# Patient Record
Sex: MALE | Race: WHITE | NOT HISPANIC OR LATINO | Employment: FULL TIME | URBAN - METROPOLITAN AREA
[De-identification: names, ages, dates, MRNs, and addresses within clinical notes are randomized per-mention and may not be internally consistent; named-entity substitution may affect disease eponyms.]

---

## 2019-07-23 ENCOUNTER — OFFICE VISIT (OUTPATIENT)
Dept: FAMILY MEDICINE | Facility: CLINIC | Age: 24
End: 2019-07-23
Payer: COMMERCIAL

## 2019-07-23 VITALS
RESPIRATION RATE: 16 BRPM | OXYGEN SATURATION: 98 % | TEMPERATURE: 98 F | DIASTOLIC BLOOD PRESSURE: 80 MMHG | BODY MASS INDEX: 25.98 KG/M2 | WEIGHT: 175.4 LBS | SYSTOLIC BLOOD PRESSURE: 122 MMHG | HEIGHT: 69 IN | HEART RATE: 63 BPM

## 2019-07-23 DIAGNOSIS — L70.9 ACNE, UNSPECIFIED ACNE TYPE: ICD-10-CM

## 2019-07-23 DIAGNOSIS — Z83.438 FAMILY HISTORY OF HYPERLIPIDEMIA: ICD-10-CM

## 2019-07-23 DIAGNOSIS — F64.0 GENDER DYSPHORIA IN ADULT: Primary | ICD-10-CM

## 2019-07-23 RX ORDER — TESTOSTERONE CYPIONATE 200 MG/ML
80 INJECTION, SOLUTION INTRAMUSCULAR WEEKLY
Qty: 2 ML | Refills: 0 | Status: SHIPPED | OUTPATIENT
Start: 2019-07-23 | End: 2019-08-20

## 2019-07-23 RX ORDER — TESTOSTERONE CYPIONATE 200 MG/ML
80 INJECTION, SOLUTION INTRAMUSCULAR WEEKLY
COMMUNITY
End: 2020-01-20

## 2019-07-23 RX ORDER — FLUOXETINE 20 MG/1
30 TABLET, FILM COATED ORAL DAILY
COMMUNITY
End: 2019-08-20

## 2019-07-23 SDOH — HEALTH STABILITY: MENTAL HEALTH: HOW MANY DRINKS CONTAINING ALCOHOL DO YOU HAVE ON A TYPICAL DAY WHEN YOU ARE DRINKING?: 1 OR 2

## 2019-07-23 SDOH — HEALTH STABILITY: MENTAL HEALTH: HOW OFTEN DO YOU HAVE SIX OR MORE DRINKS ON ONE OCCASION?: LESS THAN MONTHLY

## 2019-07-23 SDOH — HEALTH STABILITY: MENTAL HEALTH: HOW OFTEN DO YOU HAVE A DRINK CONTAINING ALCOHOL?: 2-3 TIMES A WEEK

## 2019-07-23 SDOH — HEALTH STABILITY: MENTAL HEALTH: HOW OFTEN DO YOU HAVE 6 OR MORE DRINKS ON ONE OCCASION?: LESS THAN MONTHLY

## 2019-07-23 SDOH — HEALTH STABILITY: MENTAL HEALTH: HOW MANY STANDARD DRINKS CONTAINING ALCOHOL DO YOU HAVE ON A TYPICAL DAY?: 1 OR 2

## 2019-07-23 ASSESSMENT — ANXIETY QUESTIONNAIRES
IF YOU CHECKED OFF ANY PROBLEMS ON THIS QUESTIONNAIRE, HOW DIFFICULT HAVE THESE PROBLEMS MADE IT FOR YOU TO DO YOUR WORK, TAKE CARE OF THINGS AT HOME, OR GET ALONG WITH OTHER PEOPLE: SOMEWHAT DIFFICULT
7. FEELING AFRAID AS IF SOMETHING AWFUL MIGHT HAPPEN: NOT AT ALL
3. WORRYING TOO MUCH ABOUT DIFFERENT THINGS: MORE THAN HALF THE DAYS
5. BEING SO RESTLESS THAT IT IS HARD TO SIT STILL: SEVERAL DAYS
GAD7 TOTAL SCORE: 7
1. FEELING NERVOUS, ANXIOUS, OR ON EDGE: SEVERAL DAYS
2. NOT BEING ABLE TO STOP OR CONTROL WORRYING: NOT AT ALL
6. BECOMING EASILY ANNOYED OR IRRITABLE: MORE THAN HALF THE DAYS

## 2019-07-23 ASSESSMENT — PATIENT HEALTH QUESTIONNAIRE - PHQ9
SUM OF ALL RESPONSES TO PHQ QUESTIONS 1-9: 10
5. POOR APPETITE OR OVEREATING: SEVERAL DAYS

## 2019-07-23 ASSESSMENT — MIFFLIN-ST. JEOR: SCORE: 1778.05

## 2019-07-23 NOTE — PATIENT INSTRUCTIONS
Here is the plan from today's visit    1. Gender dysphoria in adult  Labs through [facility] or can call with results. Continue current T dose.   - CBC with Diff Plt ([facility]); Future  - Comprehensive Metabolic Panel ([facility]); Future  - Lipid Cascade ([facility]); Future  - Testosterone total; Future    Please call or return to clinic if your symptoms don't go away.    Thank you for coming to [facility] today.  Lab Testing:  **If you had lab testing today and your results are reassuring or normal they will be mailed to you or sent through [facility] within 7 days.   **If the lab tests need quick action we will call you with the results.  The phone number we will call with results is # [phone] (home) . If this is not the best number please call our clinic and change the number.  Medication Refills:  If you need any refills please call your pharmacy and they will contact us.   If you need to  your refill at a new pharmacy, please contact the new pharmacy directly. The new pharmacy will help you get your medications transferred faster.   Scheduling:  If you have any concerns about today's visit or wish to schedule another appointment please call our office during normal business hours [phone] (8-5:00 M-F)  If a referral was made to a [facility] and you don't get a call from central scheduling please call [phone].  If a Mammogram was ordered for you at [facility] call [phone] to schedule or change your appointment.  If you had an XRay/CT/Ultrasound/MRI ordered the number is [phone] to schedule or change your radiology appointment.   Medical Concerns:  If you have urgent medical concerns please call [phone] at any time of the day.    Jodi Sharpe MD

## 2019-07-23 NOTE — PROGRESS NOTES
"       HPI   I am seeing this patient as a self referred consult for new patient as patient recently moved to [facility] from [place]. Parent in medicine and suggested he come here.    Luis is a 23 year old individual that uses pronouns he/him and is consulting due to continuing HRT.     Just moved to [place]. From [place]. 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     Gender identity  on spectrum of gender from masculine to feminine: masculine  Gender journey: Queer in high school, \"queen of the lesbians\" in high school. Then, went to college, met other trans people and thought this was more accurate for him. Came out to parents in college, very supportive. \"maximally smooth transition\". Few months after in sophomore, 21 years old, in college, started HRT there. On 80 mg weekly now, shot day Wednesday.   Mom is a PA and does training for queer healthcare for coworkers.   Sex assigned at birth: female  Support network for gender identity: family mom, dad and friends 4 friends that are trans, boyfriend is also trans, been together for 2 years    Body characteristics pt is happy with and does not want to change:  Generally good, wants less acne, has Mirena in place and nervous about getting periods again or having \"bumpy transition\". Last period was 4 years ago. Not spotting now, \"very rare occasions\"     Body characteristics pt is NOT happy with and WANTS to change:    Struggle with weight and wanting to be more active. Trying to accept \"wide hips\"   Enjoys rock climbing    Previous medical care related to gender affirmation:   Prior providers Dr. Fredi Brock [facility]   Already taking medications related to gender dysphoria (ie: hormones): YES   If YES, medications include the following: yes- testosterone   Previous surgeries related to gender affirmation include: top surgery in 2016   Surgical interventions desired: none currently     Mental " "Health Assessment: history of eating disorder, bulemia and binge/purge type  PHQ-9 SCORE 7/23/2019   PHQ-9 Total Score 10     PHQ-9 is 10; SERGO-7 is 7.   SERGO-7 SCORE 7/23/2019   Total Score 7     Active symptoms: working through life adjusting to new home and new roommates (not jiving well so far)  History of depression/anxiety- used to see therapist at school in [place], planning to go back to this therapist around town again here at a private practice. Taking fluoxetine since high school 30 mg. Likes this dose.    Hx of self harm:  Yes; please explain: last in high school self cutting, last cut relapse in college around time of coming out at age 21. 2.5 years since relapse in bulemia and binge/purge.    Hx of suicidal thoughts: No   Hx of suicide attempts: No    Support has helped him control eating disorder.     Therapy history for gender support: never had, only for SERGO/MDD  Non gender related therapist? See above.   Chemical use history Not addressed.   Psych dx history See above.   Medications prescribed for above and by whom? Fluoxetine see above  External Records received: not yet received     Shot day is Wednesday. Have enough for 2 more weeks. Been on testosterone (0.4 ml) weekly     Past History   Past Surgical History:   Procedure Laterality Date     MASTECTOMY, BILATERAL  2016     There is no problem list on file for this patient.    Past Medical History:   Diagnosis Date     Gender dysphoria in adult      Current Outpatient Medications   Medication Sig Dispense Refill     Eletriptan Hydrobromide (RELPAX PO)        FLUoxetine 20 MG tablet Take 30 mg by mouth daily       levonorgestrel (MIRENA) 20 MCG/24HR IUD 1 each by Intrauterine route once       needle, disp, 18G X 1\" MISC        Needle, Disp, 23G X 1\" MISC        Syringe, Disposable, (SYRINGE LUER LOCK) 3 ML MISC        testosterone cypionate (DEPOTESTOSTERONE) 200 MG/ML injection Inject 80 mg into the muscle once a week       testosterone cypionate " (DEPOTESTOSTERONE) 200 MG/ML injection Inject 0.4 mLs (80 mg) into the muscle once a week 2 mL 0     Family History   Problem Relation Age of Onset     Hyperlipidemia Mother      Hyperlipidemia Father      No Known Allergies  Social History     Socioeconomic History     Marital status: Single     Spouse name: None     Number of children: None     Years of education: None     Highest education level: None   Occupational History     None   Social Needs     Financial resource strain: None     Food insecurity:     Worry: None     Inability: None     Transportation needs:     Medical: None     Non-medical: None   Tobacco Use     Smoking status: Never Smoker     Smokeless tobacco: Never Used   Substance and Sexual Activity     Alcohol use: Yes     Frequency: 2-3 times a week     Drinks per session: 1 or 2     Binge frequency: Less than monthly     Drug use: Never     Sexual activity: Yes     Birth control/protection: IUD   Lifestyle     Physical activity:     Days per week: None     Minutes per session: None     Stress: None   Relationships     Social connections:     Talks on phone: None     Gets together: None     Attends Nondenominational service: None     Active member of club or organization: None     Attends meetings of clubs or organizations: None     Relationship status: None     Intimate partner violence:     Fear of current or ex partner: None     Emotionally abused: None     Physically abused: None     Forced sexual activity: None   Other Topics Concern     None   Social History Narrative     None     Last labs were 8 months ago.     Sexual health and relationships: not discussed needs to be addressed at next visit        Review of Systems:      CONSTITUTIONAL: NEGATIVE for fever, chills, change in weight  INTEGUMENTARY/SKIN: see HPI  CV: NEGATIVE for chest pain  HEME/ALLERGY/IMMUNE: see HPI  PSYCHIATRIC: see HPI  Sleep: not discussed           Physical Exam:     Vitals:    07/23/19 1131   BP: 122/80   BP Location:  "Right arm   Patient Position: Sitting   Cuff Size: Adult Regular   Pulse: 63   Resp: 16   Temp: 98  F (36.7  C)   TempSrc: Oral   SpO2: 98%   Weight: 79.6 kg (175 lb 6.4 oz)   Height: 1.74 m (5' 8.5\")     BMI= Body mass index is 26.28 kg/m .   Wt Readings from Last 10 Encounters:   07/23/19 79.6 kg (175 lb 6.4 oz)     Appearance: Male appearance and dress  GENERAL:: healthy, alert and no distress  Head: AT, NC   EYES: Eyes grossly normal to inspection, EOMI  RESP: effort normal  MS: extremities normal- no gross deformities noted, no edema  Psych: Alert and oriented times 3; coherent speech, normal rate and volume, able to articulate logical thoughts, able to abstract reason, no tangential thoughts, no hallucinations or delusions. Affect is normal.  Affect: Appropriate/mood-congruent  MENTAL STATUS EXAM  Appearance: appropriate  Attitude: cooperative  Behavior: normal  Eye Contact: normal  Speech: normal  Orientation: oreinted to person , place, time and situation  Mood:  \"okay\"  Thought Process: clear  Suicidal Ideation: denies  Hallucination: no       Labs:     No results found for any previous visit.   HRT labs to be collected soon.   Assessment and Plan   Luis was seen today for consult for gender support, continuing injectable testosterone (started this in 2016).     Diagnoses and all orders for this visit:    Gender dysphoria in adult  Labs ordered as future, needs lab only visit for labs on Friday or Monday for HRT labs. Says he has a history of hypercholesterolemia (and thinks this runs strongly in his family).   -     CBC with Diff Plt ([facility]); Future  -     Comprehensive Metabolic Panel ([facility]); Future  -     Lipid Cascade ([facility]); Future  -     Testosterone total; Future  -     testosterone cypionate (DEPOTESTOSTERONE) 200 MG/ML injection; Inject 0.4 mLs (80 mg) into the muscle once a week    Acne, unspecified acne type  Will discuss acne treatment at next visit in 1 month.     Discuss the " resources for gender affirming therapy. Also discuss sexual health at next visit.    Educated about 3 parts of evaluation:    1. Medical safety for hormones :   Labs ordered as future, to be done on Friday or Monday as shot day is Wed ROI for records sent, will need to be reviewed by: Dr. Brewster  Medication plan:   masculinizing hormone therapy with testosterone     Administration method:  Injectable  Next labs and exam: Follow up w/ Dr. Brewster in 1 month, I will send this note to them. Educated pt about consultant role in a residency clinic.  Recommend monthly visits after dose change or initiation. Next dose increase likely in 1 month if labs and exam are normal.  Goal dose: continue 80 mg for now  Counselled patient about controlled substances, never share, comes on paper prescription: Yes  Contraception:   not needed  Educated about testosterone as absolute contraindication in pregnancy: Yes  Fertility plan if starts cross-sex hormones: not addressed     2. Mental health assessment  To be completed by: Dr. Brewster    3. Informed consent process.   Consent  Yes Is able to provide informed consent   Yes Likely to take hormones in a responsible manner  Yes Discussed physical effects, benefits, and risk assessment & modification  Yes Discussed the clinical and biochemical monitoring of hormonal changes and the potential impact on reproductive health & fertility  We discussed thoroughly risks/benefits/alternatives of this treatment. Questions elicited and answered. Pt is fully prepared to continue hormones and is able to reason through risks and mgmt. Questions elicited and answered and they also consent, as is legally required. Will need informed consent at our clinic signed at next visit (patient has been on Testosterone for 2.5 years and was refilled today with recent move). Had informed consent at other clinic in [facility])     Adrian Brewster MD   [facility]  PGY-2    ---------------------------------------------------------------------------------------------------------    Pt can return to PCP for ongoing care at this time. Luis should have annual physical exams and labs after hormone initiation is complete (at goal dose).  Screenings and exams should be determined by the presence of body organs (ie: prostate, cervix, breasts) and medications the patient is taking. PCP is welcome to contact me at any time for further guidance if needed. For this particular patient, exams should focus on: BP, weight, lipids and weight concerns . Lab schedule should follow the hormone guideline sheet.      Today s visit included assessment of interventions to alleviate symptoms related to gender dysphoria or gender nonconformity, including psychological support, medical treatment, and options for social support or changes in gender expression. Today s visit also assessed this individual's suicide risk, as transgender patients are at higher risk of suicide, gender expression, gender identity and how well consolidated in that identity, presence or absence of gender dysphoria versus gender non-conformity, and assessment and diagnosis of coexisting mental health concerns.This assessment is based on the 2011 published Standards of Care for the Health of Transsexual, Transgender, and Gender-Nonconforming People, Version 7, by the World Professional Association of Transgender Health.  Jodi Sharpe MD

## 2019-07-24 ASSESSMENT — ANXIETY QUESTIONNAIRES: GAD7 TOTAL SCORE: 7

## 2019-07-29 DIAGNOSIS — F64.0 GENDER DYSPHORIA IN ADULT: ICD-10-CM

## 2019-07-29 LAB
% GRANULOCYTES: 60 %G (ref 40–75)
ALBUMIN SERPL-MCNC: 4.5 MG/DL (ref 3.8–5)
ALP SERPL-CCNC: 80.9 U/L (ref 31.7–110.7)
ALT SERPL-CCNC: 16.4 U/L (ref 0–45)
AST SERPL-CCNC: 17.3 U/L (ref 0–55)
BILIRUB SERPL-MCNC: 0.5 MG/DL (ref 0.2–1.3)
BUN SERPL-MCNC: 11.2 MG/DL (ref 7–21)
CALCIUM SERPL-MCNC: 9.1 MG/DL (ref 8.5–10.1)
CHLORIDE SERPLBLD-SCNC: 103.4 MMOL/L (ref 98–110)
CHOLEST SERPL-MCNC: 246.6 MG/DL (ref 0–200)
CHOLEST/HDLC SERPL: 5.5 {RATIO} (ref 0–5)
CO2 SERPL-SCNC: 29.7 MMOL/L (ref 20–32)
CREAT SERPL-MCNC: 1.1 MG/DL (ref 0.7–1.3)
GFR SERPL CREATININE-BSD FRML MDRD: 89 ML/MIN/1.7 M2
GLUCOSE SERPL-MCNC: 86 MG'DL (ref 70–99)
GRANULOCYTES #: 3 K/UL (ref 1.6–8.3)
HCT VFR BLD AUTO: 44.7 % (ref 40–53)
HDLC SERPL-MCNC: 44.6 MG/DL
HEMOGLOBIN: 14.1 G/DL (ref 13.3–17.7)
LDLC SERPL CALC-MCNC: 182 MG/DL (ref 0–129)
LYMPHOCYTES # BLD AUTO: 1.7 K/UL (ref 0.8–5.3)
LYMPHOCYTES NFR BLD AUTO: 33.9 %L (ref 20–48)
MCH RBC QN AUTO: 28.4 PG (ref 26.5–35)
MCHC RBC AUTO-ENTMCNC: 31.5 G/DL (ref 32–36)
MCV RBC AUTO: 90.1 FL (ref 78–100)
MID #: 0.3 K/UL (ref 0–2.2)
MID %: 6.1 %M (ref 0–20)
PLATELET # BLD AUTO: 329 K/UL (ref 150–450)
POTASSIUM SERPL-SCNC: 4.1 MMOL/DL (ref 3.3–4.5)
PROT SERPL-MCNC: 7 G/DL (ref 6.8–8.8)
RBC # BLD AUTO: 4.96 M/UL (ref 4.4–5.9)
SODIUM SERPL-SCNC: 138.5 MMOL/L (ref 132.6–141.4)
TRIGL SERPL-MCNC: 98.2 MG/DL (ref 0–150)
VLDL CHOLESTEROL: 19.6 MG/DL (ref 7–32)
WBC # BLD AUTO: 5 K/UL (ref 4–11)

## 2019-07-31 PROBLEM — L70.9 ACNE, UNSPECIFIED ACNE TYPE: Status: ACTIVE | Noted: 2019-07-31

## 2019-07-31 PROBLEM — Z83.438 FAMILY HISTORY OF HYPERLIPIDEMIA: Status: ACTIVE | Noted: 2019-07-31

## 2019-08-01 LAB — TESTOST SERPL-MCNC: 853 NG/DL (ref 240–950)

## 2019-08-20 ENCOUNTER — OFFICE VISIT (OUTPATIENT)
Dept: FAMILY MEDICINE | Facility: CLINIC | Age: 24
End: 2019-08-20
Payer: COMMERCIAL

## 2019-08-20 VITALS
RESPIRATION RATE: 16 BRPM | SYSTOLIC BLOOD PRESSURE: 114 MMHG | HEIGHT: 69 IN | TEMPERATURE: 98.1 F | OXYGEN SATURATION: 96 % | HEART RATE: 74 BPM | DIASTOLIC BLOOD PRESSURE: 75 MMHG | BODY MASS INDEX: 26.6 KG/M2 | WEIGHT: 179.6 LBS

## 2019-08-20 DIAGNOSIS — F64.0 GENDER DYSPHORIA IN ADULT: ICD-10-CM

## 2019-08-20 DIAGNOSIS — F32.1 CURRENT MODERATE EPISODE OF MAJOR DEPRESSIVE DISORDER, UNSPECIFIED WHETHER RECURRENT (H): ICD-10-CM

## 2019-08-20 DIAGNOSIS — M25.561 RIGHT KNEE PAIN, UNSPECIFIED CHRONICITY: Primary | ICD-10-CM

## 2019-08-20 DIAGNOSIS — E78.00 HIGH CHOLESTEROL: ICD-10-CM

## 2019-08-20 DIAGNOSIS — Z83.438 FAMILY HISTORY OF HYPERLIPIDEMIA: ICD-10-CM

## 2019-08-20 RX ORDER — TESTOSTERONE CYPIONATE 200 MG/ML
60 INJECTION, SOLUTION INTRAMUSCULAR WEEKLY
Qty: 5 ML | Refills: 0 | Status: SHIPPED | OUTPATIENT
Start: 2019-08-20 | End: 2019-10-03

## 2019-08-20 RX ORDER — FLUOXETINE 20 MG/1
30 TABLET, FILM COATED ORAL DAILY
Qty: 30 TABLET | Refills: 4 | Status: SHIPPED | OUTPATIENT
Start: 2019-08-20 | End: 2019-12-20

## 2019-08-20 ASSESSMENT — ANXIETY QUESTIONNAIRES
5. BEING SO RESTLESS THAT IT IS HARD TO SIT STILL: NOT AT ALL
1. FEELING NERVOUS, ANXIOUS, OR ON EDGE: SEVERAL DAYS
7. FEELING AFRAID AS IF SOMETHING AWFUL MIGHT HAPPEN: NOT AT ALL
6. BECOMING EASILY ANNOYED OR IRRITABLE: MORE THAN HALF THE DAYS
2. NOT BEING ABLE TO STOP OR CONTROL WORRYING: SEVERAL DAYS
IF YOU CHECKED OFF ANY PROBLEMS ON THIS QUESTIONNAIRE, HOW DIFFICULT HAVE THESE PROBLEMS MADE IT FOR YOU TO DO YOUR WORK, TAKE CARE OF THINGS AT HOME, OR GET ALONG WITH OTHER PEOPLE: SOMEWHAT DIFFICULT
3. WORRYING TOO MUCH ABOUT DIFFERENT THINGS: SEVERAL DAYS
GAD7 TOTAL SCORE: 6

## 2019-08-20 ASSESSMENT — PATIENT HEALTH QUESTIONNAIRE - PHQ9
SUM OF ALL RESPONSES TO PHQ QUESTIONS 1-9: 10
5. POOR APPETITE OR OVEREATING: SEVERAL DAYS

## 2019-08-20 ASSESSMENT — ENCOUNTER SYMPTOMS
FEVER: 0
SHORTNESS OF BREATH: 0
ABDOMINAL PAIN: 0
CHILLS: 0

## 2019-08-20 ASSESSMENT — MIFFLIN-ST. JEOR: SCORE: 1797.1

## 2019-08-20 NOTE — PROGRESS NOTES
JENNIFER       Luis Epstein is a 23 year old  who presents for   Chief Complaint   Patient presents with     Follow Up     HRT follow up     Therapy - hasn't sought out gender affirming therapy yet but thinking about it  Lab results: high LDL and cholesterol     Diet: vegetarian and thinks he eats fairly healthy. Occasionally drinks 1 soda. Other doctor thought it was his diet but he isn't really sure this is a contributor. Likes salt but doesn't really add it after cooking.     Exercise: exercises 3x weekly 1 hour. Usually he rock climbs and jogs after this if needed.    as a job      FH:    Dad - high cholesterol  Mom - high cholesterol since college  Both take medication.     Right knee - 3 years duration. 'crackly' when moving, no catching  Or locking. Thought there may be meniscal tear in the past. No history of trauma or past surgery. Never had physical therapy for it before. No pain now. No swelling. No redness.     Problem, Medication and Allergy Lists were reviewed and updated if needed..    Patient is an established patient of this clinic.  Past Medical History:   Diagnosis Date     Gender dysphoria in adult      Family History   Problem Relation Age of Onset     Hyperlipidemia Mother      Hyperlipidemia Father      Social History     Socioeconomic History     Marital status: Single     Spouse name: None     Number of children: None     Years of education: None     Highest education level: None   Occupational History     Occupation: student     Comment: getting masters in education     Occupation: teacher     Comment: [employer]   Social Needs     Financial resource strain: None     Food insecurity:     Worry: None     Inability: None     Transportation needs:     Medical: None     Non-medical: None   Tobacco Use     Smoking status: Never Smoker     Smokeless tobacco: Never Used   Substance and Sexual Activity     Alcohol use: Yes     Frequency: 2-3 times a week     Drinks per session: 1 or 2      "Binge frequency: Less than monthly     Drug use: Never     Sexual activity: Yes     Birth control/protection: IUD   Lifestyle     Physical activity:     Days per week: None     Minutes per session: None     Stress: None   Relationships     Social connections:     Talks on phone: None     Gets together: None     Attends Gnosticism service: None     Active member of club or organization: None     Attends meetings of clubs or organizations: None     Relationship status: None     Intimate partner violence:     Fear of current or ex partner: None     Emotionally abused: None     Physically abused: None     Forced sexual activity: None   Other Topics Concern     None   Social History Narrative     None          Review of Systems:   Review of Systems   Constitutional: Negative for chills and fever.   Respiratory: Negative for shortness of breath.    Cardiovascular: Negative for chest pain.   Gastrointestinal: Negative for abdominal pain.   Musculoskeletal:        Hpi           Physical Exam:     Vitals:    08/20/19 1031   BP: 114/75   Pulse: 74   Resp: 16   Temp: 98.1  F (36.7  C)   TempSrc: Oral   SpO2: 96%   Weight: 81.5 kg (179 lb 9.6 oz)   Height: 1.74 m (5' 8.5\")     Body mass index is 26.91 kg/m .  Vitals were reviewed and were normal     Physical Exam   Constitutional: He is oriented to person, place, and time. He appears well-developed and well-nourished. No distress.   HENT:   Head: Normocephalic and atraumatic.   Right Ear: Hearing, tympanic membrane and ear canal normal.   Left Ear: Hearing, tympanic membrane and ear canal normal.   Eyes: Conjunctivae are normal. No scleral icterus.   Neck: Neck supple.   Pulmonary/Chest: Effort normal. No respiratory distress.   Musculoskeletal:        Left knee: He exhibits abnormal meniscus (positive mcmurrays and positive thesally especially with varus movement). He exhibits normal range of motion, no swelling, no effusion, no erythema, no LCL laxity, normal patellar " mobility, no bony tenderness and no MCL laxity. No tenderness found.   Neurological: He is alert and oriented to person, place, and time.   Skin: Skin is warm and dry. He is not diaphoretic.   Psychiatric: He has a normal mood and affect. His behavior is normal. Judgment and thought content normal.       Results:   Results from last visit:  Orders Only on 07/29/2019   Component Date Value Ref Range Status     Testosterone Total 07/29/2019 853  240 - 950 ng/dL Final    Comment: This test was developed and its performance characteristics determined by the   [facility],  Special Chemistry Laboratory. It has   not been cleared or approved by the FDA. The laboratory is regulated under   CLIA as qualified to perform high-complexity testing. This test is used for   clinical purposes. It should not be regarded as investigational or for   research.       Cholesterol 07/29/2019 246.6* 0.0 - 200.0 mg/dL Final     Cholesterol/HDL Ratio 07/29/2019 5.5* 0.0 - 5.0 Final     HDL Cholesterol 07/29/2019 44.6  >40.0 mg/dL Final     LDL Cholesterol Calculated 07/29/2019 182* 0 - 129 mg/dL Final     Triglycerides 07/29/2019 98.2  0.0 - 150.0 mg/dL Final     VLDL Cholesterol 07/29/2019 19.6  7.0 - 32.0 mg/dL Final     Albumin 07/29/2019 4.5  3.8 - 5.0 mg/dL Final     Alkaline Phosphatase 07/29/2019 80.9  31.7 - 110.7 U/L Final     ALT 07/29/2019 16.4  0.0 - 45.0 U/L Final     AST 07/29/2019 17.3  0.0 - 55.0 U/L Final     Bilirubin Total 07/29/2019 0.5  0.2 - 1.3 mg/dL Final     Urea Nitrogen 07/29/2019 11.2  7.0 - 21.0 mg/dL Final     Calcium 07/29/2019 9.1  8.5 - 10.1 mg/dL Final     Chloride 07/29/2019 103.4  98.0 - 110.0 mmol/L Final     Carbon Dioxide 07/29/2019 29.7  20.0 - 32.0 mmol/L Final     Creatinine 07/29/2019 1.1  0.7 - 1.3 mg/dL Final     Glucose 07/29/2019 86.0  70.0 - 99.0 mg'dL Final     Potassium 07/29/2019 4.1  3.3 - 4.5 mmol/dL Final     Sodium 07/29/2019 138.5  132.6 - 141.4 mmol/L Final      Protein Total 07/29/2019 7.0  6.8 - 8.8 g/dL Final     GFR Estimate 07/29/2019 89.0  >60.0 mL/min/1.7 m2 Final     GFR Estimate If Black 07/29/2019 >90  >60.0 mL/min/1.7 m2 Final     WBC 07/29/2019 5.0  4.0 - 11.0 K/uL Final     Lymphocytes # 07/29/2019 1.7  0.8 - 5.3 K/uL Final     % Lymphocytes 07/29/2019 33.9  20.0 - 48.0 %L Final     Mid # 07/29/2019 0.3  0.0 - 2.2 K/uL Final     Mid % 07/29/2019 6.1  0.0 - 20.0 %M Final     GRANULOCYTES # 07/29/2019 3.0  1.6 - 8.3 K/uL Final     % Granulocytes 07/29/2019 60.0  40.0 - 75.0 %G Final     RBC 07/29/2019 4.96  4.40 - 5.90 M/uL Final     Hemoglobin 07/29/2019 14.1  13.3 - 17.7 g/dL Final     Hematocrit 07/29/2019 44.7  40.0 - 53.0 % Final     MCV 07/29/2019 90.1  78.0 - 100.0 fL Final     MCH 07/29/2019 28.4  26.5 - 35.0 pg Final     MCHC 07/29/2019 31.5* 32.0 - 36.0 g/dL Final     Platelets 07/29/2019 329.0  150.0 - 450.0 K/uL Final     Assessment and Plan    Luis was seen today for follow up.    Diagnoses and all orders for this visit:    Right knee pain, unspecified chronicity  Exam likely meniscal tear in origin. Not acute, 3 year duration. Will try physical therapy first.   -     [facility], PT, HAND AND CHIROPRACTIC REFERRAL - [facility]; Future    Gender dysphoria in adult  Decrease dose of T to 60 mg (0.3 ml) weekly from 80 mg so that this may hopefully help with his high LDL/high cholesterol although there is a strong genetic component to this as well. He is well within goal range of 800 at last check in July. With change, will get HRT labs in 3 months from today's visit.   Encouraged Luis to reach out to gender affirming therapists in the [place]- given handout at last visit for resources.   -     testosterone cypionate (DEPOTESTOSTERONE) 200 MG/ML injection; Inject 0.3 mLs (60 mg) into the muscle once a week  -     Lipid [place] ([facility]); Future  -     Testosterone total; Future  -     Hemoglobin (HGB) ([facility]); Future  -     Glucose ([facility]); Future  -     CBC  with Diff Plt ([facility]); Future    Current moderate episode of major depressive disorder, unspecified whether recurrent (H)  Worsened depression with recent move to the  but stable, safe today, denies SI or HI. Chronic dose of 30 mg fluoxetine refilled today. PHQ-9 today given.   -     FLUoxetine 20 MG tablet; Take 1.5 tablets (30 mg) by mouth daily    Family history of hyperlipidemia  High cholesterol  Strong family history while taking Testosterone. Will need statin therapy even at this age if his LDL is above 190 (now is borderline at 182).     Future labs pended for 3 month follow up.      Medications Discontinued During This Encounter   Medication Reason     testosterone cypionate (DEPOTESTOSTERONE) 200 MG/ML injection      FLUoxetine 20 MG tablet Reorder     Options for treatment and follow-up care were reviewed with the patient. Luis Epstein  engaged in the decision making process and verbalized understanding of the options discussed and agreed with the final plan.    Adrian Brewster MD

## 2019-08-20 NOTE — PATIENT INSTRUCTIONS
Here is the plan from today's visit    1. Right knee pain, unspecified chronicity  - [facility], PT, HAND AND CHIROPRACTIC REFERRAL - [facility]; Future    Keep up the good work with diet and exercise!     2. Gender dysphoria in adult  - testosterone cypionate (DEPOTESTOSTERONE) 200 MG/ML injection; Inject 0.3 mLs (60 mg) into the muscle once a week  Dispense: 5 mL; Refill: 0    3. Current moderate episode of major depressive disorder, unspecified whether recurrent (H)    - FLUoxetine 20 MG tablet; Take 1.5 tablets (30 mg) by mouth daily  Dispense: 30 tablet; Refill: 4    Please call or return to clinic if your symptoms don't go away    Thank you for coming to [facility] today.  Lab Testing:  **If you had lab testing today and your results are reassuring or normal they will be mailed to you or sent through [facility] within 7 days.   **If the lab tests need quick action we will call you with the results.  The phone number we will call with results is # [phone] (home) . If this is not the best number please call our clinic and change the number.  Medication Refills:  If you need any refills please call your pharmacy and they will contact us.   If you need to  your refill at a new pharmacy, please contact the new pharmacy directly. The new pharmacy will help you get your medications transferred faster.   Scheduling:  If you have any concerns about today's visit or wish to schedule another appointment please call our office during normal business hours [phone] (8-5:00 M-F)  If a referral was made to a [facility] and you don't get a call from central scheduling please call [phone].  If a Mammogram was ordered for you at [facility] call [phone] to schedule or change your appointment.  If you had an XRay/CT/Ultrasound/MRI ordered the number is [phone] to schedule or change your radiology appointment.   Medical Concerns:  If you have urgent medical concerns please call  [phone] at any time of the day.    Adrian Brewster MD

## 2019-08-20 NOTE — PROGRESS NOTES
Preceptor Attestation:   Patient seen and discussed with the resident. Assessment and plan reviewed with resident and agreed upon.   Supervising Physician:  [facility]

## 2019-08-21 ASSESSMENT — ANXIETY QUESTIONNAIRES: GAD7 TOTAL SCORE: 6

## 2019-10-01 ENCOUNTER — THERAPY VISIT (OUTPATIENT)
Dept: PHYSICAL THERAPY | Facility: CLINIC | Age: 24
End: 2019-10-01
Attending: FAMILY MEDICINE
Payer: COMMERCIAL

## 2019-10-01 DIAGNOSIS — M25.561 RIGHT KNEE PAIN, UNSPECIFIED CHRONICITY: ICD-10-CM

## 2019-10-01 PROCEDURE — 97110 THERAPEUTIC EXERCISES: CPT | Mod: GP | Performed by: PHYSICAL THERAPIST

## 2019-10-01 PROCEDURE — 97161 PT EVAL LOW COMPLEX 20 MIN: CPT | Mod: GP | Performed by: PHYSICAL THERAPIST

## 2019-10-01 ASSESSMENT — ACTIVITIES OF DAILY LIVING (ADL)
GO UP STAIRS: ACTIVITY IS SOMEWHAT DIFFICULT
LIMPING: I HAVE THE SYMPTOM BUT IT DOES NOT AFFECT MY ACTIVITY
WEAKNESS: THE SYMPTOM AFFECTS MY ACTIVITY SLIGHTLY
HOW_WOULD_YOU_RATE_THE_OVERALL_FUNCTION_OF_YOUR_KNEE_DURING_YOUR_USUAL_DAILY_ACTIVITIES?: NEARLY NORMAL
RAW_SCORE: 50
KNEE_ACTIVITY_OF_DAILY_LIVING_SUM: 50
SWELLING: I DO NOT HAVE THE SYMPTOM
RISE FROM A CHAIR: ACTIVITY IS NOT DIFFICULT
SQUAT: ACTIVITY IS SOMEWHAT DIFFICULT
AS_A_RESULT_OF_YOUR_KNEE_INJURY,_HOW_WOULD_YOU_RATE_YOUR_CURRENT_LEVEL_OF_DAILY_ACTIVITY?: NEARLY NORMAL
HOW_WOULD_YOU_RATE_THE_CURRENT_FUNCTION_OF_YOUR_KNEE_DURING_YOUR_USUAL_DAILY_ACTIVITIES_ON_A_SCALE_FROM_0_TO_100_WITH_100_BEING_YOUR_LEVEL_OF_KNEE_FUNCTION_PRIOR_TO_YOUR_INJURY_AND_0_BEING_THE_INABILITY_TO_PERFORM_ANY_OF_YOUR_USUAL_DAILY_ACTIVITIES?: 80
STIFFNESS: THE SYMPTOM AFFECTS MY ACTIVITY SLIGHTLY
STAND: ACTIVITY IS NOT DIFFICULT
WALK: ACTIVITY IS SOMEWHAT DIFFICULT
KNEE_ACTIVITY_OF_DAILY_LIVING_SCORE: 71.43
GIVING WAY, BUCKLING OR SHIFTING OF KNEE: THE SYMPTOM AFFECTS MY ACTIVITY MODERATELY
KNEEL ON THE FRONT OF YOUR KNEE: ACTIVITY IS MINIMALLY DIFFICULT
GO DOWN STAIRS: ACTIVITY IS SOMEWHAT DIFFICULT
SIT WITH YOUR KNEE BENT: ACTIVITY IS NOT DIFFICULT
PAIN: THE SYMPTOM AFFECTS MY ACTIVITY MODERATELY

## 2019-10-01 NOTE — PROGRESS NOTES
"[facility] Initial Evaluation    Subjective:  Pt presents to PT with a chief complaint of R knee pain with reported initial onset ~4 years ago with recurrent flare ups with activity (ie dancing, pivoting/twisting). Pt reports having \"cracking\" sounds in his knee with bending. Primary pain location identified at R medial patella and denies any radiating distal sxs.     Type of problem:  Right knee    This is a chronic condition   Problem details: 08/2019.   Patient reports pain:  Anterior and medial.  Associated symptoms:  Loss of strength and catching. Symptoms are exacerbated by kneeling, bending/squatting, ascending stairs and descending stairs and relieved by rest.    Where condition occurred: for unknown reasons.  and reported as 2/10 on pain scale. General health as reported by patient is good. Pertinent medical history includes:  High blood pressure.    Surgeries include:  None.  Current medications:  Anti-depressants.   Primary job tasks include:  Computer work.  Pain is described as aching and is intermittent. Pain is worse in the P.M.. Since onset symptoms are gradually worsening. Special tests:  X-ray.     Occupation: . Restrictions include:  Working in normal job without restrictions.    Barriers include:  None as reported by patient.  Red flags:  None as reported by patient.                      Objective:  System                                           Hip Evaluation    Hip Strength:      Extension:  Left: 5-/5  Pain:Right: 5-/5    Pain:    Abduction:  Left: 5/5     Pain:Right: 5-/5    Pain:                           Knee Evaluation:  ROM:      PROM      Extension: Left: 0    Right:  0  Flexion: Left: 140    Right:  140  Pain: crepitus present during passive knee flexion/patellar mobility    Strength:     Extension:  Left: 5/5   Pain:      Right: 5-/5    Pain:+  Flexion:  Left: 5/5   Pain:      Right: 5/5   Pain:                  Functional Testing:          Quad:  "   Single Leg Squat:  Left:       Mild loss of control  Right:      Pain/crepitus w/ KF   Moderate loss of control, hip substitution and femoral IR  Bilateral Leg Squat:                General     ROS    Assessment/Plan:    Patient is a 24 year old male with right side knee complaints.    Patient has the following significant findings with corresponding treatment plan.                Diagnosis 1:  R knee pain  Pain -  splint/taping/bracing/orthotics, self management and education  Decreased ROM/flexibility - manual therapy and therapeutic exercise  Decreased strength - therapeutic exercise and therapeutic activities  Impaired muscle performance - neuro re-education    Therapy Evaluation Codes:     Cumulative Therapy Evaluation is: Low complexity.    Previous and current functional limitations:  (See Goal Flow Sheet for this information)    Short term and Long term goals: (See Goal Flow Sheet for this information)     Communication ability:  Patient appears to be able to clearly communicate and understand verbal and written communication and follow directions correctly.  Treatment Explanation - The following has been discussed with the patient:   RX ordered/plan of care  Anticipated outcomes  Possible risks and side effects  This patient would benefit from PT intervention to resume normal activities.   Rehab potential is good.    Frequency:  1 X week, once daily  Duration:  for 4 weeks tapering to 2 X a month over 4 weeks  Discharge Plan:  Achieve all LTG.  Independent in home treatment program.  Reach maximal therapeutic benefit.    Please refer to the daily flowsheet for treatment today, total treatment time and time spent performing 1:1 timed codes.

## 2019-10-02 DIAGNOSIS — F64.0 GENDER DYSPHORIA IN ADULT: ICD-10-CM

## 2019-10-02 NOTE — TELEPHONE ENCOUNTER

## 2019-10-03 PROBLEM — M25.561 RIGHT KNEE PAIN: Status: ACTIVE | Noted: 2019-10-03

## 2019-10-03 RX ORDER — TESTOSTERONE CYPIONATE 200 MG/ML
60 INJECTION, SOLUTION INTRAMUSCULAR WEEKLY
Qty: 5 ML | Refills: 1 | Status: SHIPPED | OUTPATIENT
Start: 2019-10-03 | End: 2019-12-20

## 2019-10-03 NOTE — TELEPHONE ENCOUNTER
RN spoke with PCP on the phone to get a verbal to switch the quantity to 5 mL with 1 refill.  Joann Lance RN

## 2019-10-23 ENCOUNTER — THERAPY VISIT (OUTPATIENT)
Dept: PHYSICAL THERAPY | Facility: CLINIC | Age: 24
End: 2019-10-23
Payer: COMMERCIAL

## 2019-10-23 DIAGNOSIS — M25.561 RIGHT KNEE PAIN: ICD-10-CM

## 2019-10-23 PROCEDURE — 97112 NEUROMUSCULAR REEDUCATION: CPT | Mod: GP | Performed by: PHYSICAL THERAPIST

## 2019-10-23 PROCEDURE — 97110 THERAPEUTIC EXERCISES: CPT | Mod: GP | Performed by: PHYSICAL THERAPIST

## 2019-12-05 ENCOUNTER — OFFICE VISIT (OUTPATIENT)
Dept: FAMILY MEDICINE | Facility: CLINIC | Age: 24
End: 2019-12-05
Payer: COMMERCIAL

## 2019-12-05 VITALS
DIASTOLIC BLOOD PRESSURE: 73 MMHG | RESPIRATION RATE: 16 BRPM | HEART RATE: 76 BPM | TEMPERATURE: 97.7 F | WEIGHT: 181.2 LBS | OXYGEN SATURATION: 100 % | HEIGHT: 69 IN | SYSTOLIC BLOOD PRESSURE: 119 MMHG | BODY MASS INDEX: 26.84 KG/M2

## 2019-12-05 DIAGNOSIS — F64.0 GENDER DYSPHORIA IN ADULT: ICD-10-CM

## 2019-12-05 DIAGNOSIS — R53.83 FATIGUE, UNSPECIFIED TYPE: ICD-10-CM

## 2019-12-05 DIAGNOSIS — R07.89 OTHER CHEST PAIN: Primary | ICD-10-CM

## 2019-12-05 LAB
% GRANULOCYTES: 64.9 %G (ref 40–75)
ALBUMIN SERPL-MCNC: 3.8 G/DL (ref 3.4–5)
ALP SERPL-CCNC: 90 U/L (ref 40–150)
ALT SERPL W P-5'-P-CCNC: 38 U/L (ref 0–70)
ANION GAP SERPL CALCULATED.3IONS-SCNC: 10 MMOL/L (ref 3–14)
AST SERPL W P-5'-P-CCNC: 22 U/L (ref 0–45)
BILIRUB SERPL-MCNC: 0.7 MG/DL (ref 0.2–1.3)
BUN SERPL-MCNC: 18 MG/DL (ref 7–30)
CALCIUM SERPL-MCNC: 8.8 MG/DL (ref 8.5–10.1)
CHLORIDE SERPL-SCNC: 107 MMOL/L (ref 94–109)
CHOLEST SERPL-MCNC: 272 MG/DL
CO2 SERPL-SCNC: 26 MMOL/L (ref 20–32)
CREAT SERPL-MCNC: 1.07 MG/DL (ref 0.66–1.25)
GFR SERPL CREATININE-BSD FRML MDRD: >90 ML/MIN/{1.73_M2}
GLUCOSE SERPL-MCNC: 83 MG/DL (ref 70–99)
GRANULOCYTES #: 3.8 K/UL (ref 1.6–8.3)
HCT VFR BLD AUTO: 50.2 % (ref 40–53)
HDLC SERPL-MCNC: 44 MG/DL
HEMOGLOBIN: 15 G/DL (ref 13.3–17.7)
LDLC SERPL CALC-MCNC: 179 MG/DL
LYMPHOCYTES # BLD AUTO: 1.7 K/UL (ref 0.8–5.3)
LYMPHOCYTES NFR BLD AUTO: 30.1 %L (ref 20–48)
MCH RBC QN AUTO: 27.9 PG (ref 26.5–35)
MCHC RBC AUTO-ENTMCNC: 29.9 G/DL (ref 32–36)
MCV RBC AUTO: 93.4 FL (ref 78–100)
MID #: 0.3 K/UL (ref 0–2.2)
MID %: 5 %M (ref 0–20)
NONHDLC SERPL-MCNC: 227 MG/DL
PLATELET # BLD AUTO: 312 K/UL (ref 150–450)
POTASSIUM SERPL-SCNC: 3.8 MMOL/L (ref 3.4–5.3)
PROT SERPL-MCNC: 7.1 G/DL (ref 6.8–8.8)
RBC # BLD AUTO: 5.37 M/UL (ref 4.4–5.9)
SODIUM SERPL-SCNC: 144 MMOL/L (ref 133–144)
TRIGL SERPL-MCNC: 243 MG/DL
TSH SERPL DL<=0.005 MIU/L-ACNC: 1.45 MU/L (ref 0.4–4)
WBC # BLD AUTO: 5.8 K/UL (ref 4–11)

## 2019-12-05 ASSESSMENT — ENCOUNTER SYMPTOMS
PALPITATIONS: 0
FEVER: 0
SORE THROAT: 0
FATIGUE: 1
COUGH: 1
RHINORRHEA: 1
ABDOMINAL PAIN: 0

## 2019-12-05 ASSESSMENT — MIFFLIN-ST. JEOR: SCORE: 1805.68

## 2019-12-05 NOTE — PROGRESS NOTES
Preceptor Attestation:   Patient seen, evaluated and discussed with the resident. I have verified the content of the note, which accurately reflects my assessment of the patient and the plan of care.   Supervising Physician:  Yuliet Perez MD

## 2019-12-07 LAB — TESTOST SERPL-MCNC: 305 NG/DL (ref 240–950)

## 2019-12-19 DIAGNOSIS — F64.0 GENDER DYSPHORIA IN ADULT: ICD-10-CM

## 2019-12-19 DIAGNOSIS — F32.1 CURRENT MODERATE EPISODE OF MAJOR DEPRESSIVE DISORDER, UNSPECIFIED WHETHER RECURRENT (H): ICD-10-CM

## 2019-12-19 NOTE — TELEPHONE ENCOUNTER
"Request for medication refill:    testosterone cypionate (DEPOTESTOSTERONE) 200 MG/ML injection  There are 2 on the med list and there are different dosages, also one shows to be discontinued by pt request. Please advise    Providers if patient needs an appointment and you are willing to give a one month supply please refill for one month and  send a letter/MyChart using \".SMILLIMITEDREFILL\" .smillimited and route chart to \"P SMI \" (Giving one month refill in non controlled medications is strongly recommended before denial)    If refill has been denied, meaning absolutely no refills without visit, please complete the smart phrase \".smirxrefuse\" and route it to the \"P SMI MED REFILLS\"  pool to inform the patient and the pharmacy.    Rei Coleman MA        "

## 2019-12-20 RX ORDER — TESTOSTERONE CYPIONATE 200 MG/ML
60 INJECTION, SOLUTION INTRAMUSCULAR WEEKLY
Qty: 4 ML | Refills: 0 | Status: SHIPPED | OUTPATIENT
Start: 2019-12-20 | End: 2020-01-20

## 2019-12-20 RX ORDER — FLUOXETINE 20 MG/1
30 TABLET, FILM COATED ORAL DAILY
Qty: 30 TABLET | Refills: 4 | Status: SHIPPED | OUTPATIENT
Start: 2019-12-20 | End: 2020-03-25

## 2019-12-20 NOTE — TELEPHONE ENCOUNTER
RN printed, had preceptor sign and faxed to the pharmacy. Patient notified to schedule within the next month, and assisted him to make a visit.  Joann Lance RN

## 2020-01-08 PROBLEM — M25.561 RIGHT KNEE PAIN: Status: RESOLVED | Noted: 2019-10-03 | Resolved: 2020-01-08

## 2020-01-08 NOTE — PROGRESS NOTES
"Discharge Note    Progress reporting period is from initial evaluation date (please see noted date below) to Oct 23, 2019.  Linked Episodes   Type: Episode: Status: Noted: Resolved: Last update: Updated by:   PHYSICAL THERAPY R knee 10/1/19 Active 10/1/2019  10/23/2019  5:22 PM Yevgeniy Acevedo, PT      Comments:       Luis failed to follow up and current status is unknown.  Please see information below for last relevant information on current status.  Patient seen for 2 visits.    SUBJECTIVE  Subjective changes noted by patient:  Doing well, reports adherence to HEP. Feels like strengthening has gone well. Still has concerns with \"cracking\" in his R knee.   .  Current pain level is 4/10.     Previous pain level was  4/10.   Changes in function:  Yes (See Goal flowsheet attached for changes in current functional level)  Adverse reaction to treatment or activity: None    OBJECTIVE  Changes noted in objective findings: Tolerated progression in strengthening in exercises w/o issues.      ASSESSMENT/PLAN  Diagnosis: R PFPS   Updated problem list and treatment plan:   Pain - HEP  STG/LTGs have been met or progress has been made towards goals:  Yes, please see goal flowsheet for most current information  Assessment of Progress: current status is unknown.    Last current status: Pt is progressing as expected   Self Management Plans:  HEP  I have re-evaluated this patient and find that the nature, scope, duration and intensity of the therapy is appropriate for the medical condition of the patient.  Luis continues to require the following intervention to meet STG and LTG's:  HEP.    Recommendations:  Discharge with current home program.  Patient to follow up with MD as needed.    Please refer to the daily flowsheet for treatment today, total treatment time and time spent performing 1:1 timed codes.    "

## 2020-01-20 ENCOUNTER — OFFICE VISIT (OUTPATIENT)
Dept: FAMILY MEDICINE | Facility: CLINIC | Age: 25
End: 2020-01-20
Payer: COMMERCIAL

## 2020-01-20 VITALS
DIASTOLIC BLOOD PRESSURE: 80 MMHG | HEART RATE: 62 BPM | SYSTOLIC BLOOD PRESSURE: 120 MMHG | OXYGEN SATURATION: 96 % | TEMPERATURE: 98 F | RESPIRATION RATE: 16 BRPM | HEIGHT: 68 IN | WEIGHT: 187.4 LBS | BODY MASS INDEX: 28.4 KG/M2

## 2020-01-20 DIAGNOSIS — E78.00 HIGH CHOLESTEROL: ICD-10-CM

## 2020-01-20 DIAGNOSIS — Z00.00 HEALTHCARE MAINTENANCE: ICD-10-CM

## 2020-01-20 DIAGNOSIS — Z83.438 FAMILY HISTORY OF HYPERLIPIDEMIA: ICD-10-CM

## 2020-01-20 DIAGNOSIS — F64.0 GENDER DYSPHORIA IN ADULT: Primary | ICD-10-CM

## 2020-01-20 RX ORDER — TESTOSTERONE CYPIONATE 200 MG/ML
60 INJECTION, SOLUTION INTRAMUSCULAR WEEKLY
Qty: 4 ML | Refills: 0 | Status: SHIPPED | OUTPATIENT
Start: 2020-01-20 | End: 2020-03-06

## 2020-01-20 ASSESSMENT — MIFFLIN-ST. JEOR: SCORE: 1815.05

## 2020-01-20 NOTE — PATIENT INSTRUCTIONS
"Here is the plan from today's visit    1. High cholesterol  Come back in the next week to check a fasting blood test.  - Lipid Cascade ([facility]); Future    2. Gender dysphoria in adult  Refills sent today and referral placed for hysterectomy.  - COMPREHENSIVE GENDER CARE REFERRAL - INTERNAL  - needle, disp, 18G X 1\" MISC; 1 applicator every 7 days  Dispense: 100 each; Refill: 0  - Needle, Disp, 23G X 1\" MISC; 1 applicator every 7 days  Dispense: 100 each; Refill: 0  - Syringe, Disposable, (SYRINGE LUER LOCK) 3 ML MISC; 1 applicator every 7 days  Dispense: 100 each; Refill: 0  - testosterone cypionate (DEPOTESTOSTERONE) 200 MG/ML injection; Inject 0.3 mLs (60 mg) into the muscle once a week  Dispense: 4 mL; Refill: 0        Follow up plan  3 months with Dr. Brewster    Thank you for coming to [facility] today.  Lab Testing:  **If you had lab testing today and your results are reassuring or normal they will be mailed to you or sent through [employer] within 7 days.   **If the lab tests need quick action we will call you with the results.  The phone number we will call with results is # [phone] (home) . If this is not the best number please call our clinic and change the number.  Medication Refills:  If you need any refills please call your pharmacy and they will contact us.   If you need to  your refill at a new pharmacy, please contact the new pharmacy directly. The new pharmacy will help you get your medications transferred faster.   Scheduling:  If you have any concerns about today's visit or wish to schedule another appointment please call our office during normal business hours [phone] (8-5:00 M-F)  If a referral was made to a [facility] and you don't get a call from central scheduling please call [phone].  If a Mammogram was ordered for you at [facility] call [phone] to schedule or change your appointment.  If you had an XRay/CT/Ultrasound/MRI ordered the " number is [phone] to schedule or change your radiology appointment.   Medical Concerns:  If you have urgent medical concerns please call [phone] at any time of the day.    Erika Lopez MD

## 2020-01-20 NOTE — PROGRESS NOTES
Preceptor Attestation:   Patient seen, evaluated and discussed with the resident. I have verified the content of the note, which accurately reflects my assessment of the patient and the plan of care.   Supervising Physician:  Ron Hood MD.

## 2020-01-20 NOTE — PROGRESS NOTES
"       JENNIFER Smith is a 24 year old individual that uses pronouns He/Him/His/Himself that presents today for follow up of:  masculinizing hormone therapy.     Any special concerns today?  Refill T, and discuss hysterectomy.     Been on T for 2.5 years. Has been on 60mg weekly (wed injection) for about 1 year, happy with current place, does not want to change dose today, last T was in the 300s. Has seen all the changes hoped for.    Cholesterol is high, family history of high cholesterol. Has been working on this with diet, nothing has really helped much.     Mirena placed: placed 2015    Gender affirmation is being sought in these other ways: desires hysterectomy,s/p top surgery.   And hormones    ---    Past Surgical History:   Procedure Laterality Date     MASTECTOMY, BILATERAL  2016       Patient Active Problem List   Diagnosis     Gender dysphoria in adult     Acne, unspecified acne type     Family history of hyperlipidemia       Current Outpatient Medications   Medication Sig Dispense Refill     Eletriptan Hydrobromide (RELPAX PO)        FLUoxetine 20 MG tablet Take 1.5 tablets (30 mg) by mouth daily 30 tablet 4     levonorgestrel (MIRENA) 20 MCG/24HR IUD 1 each by Intrauterine route once       needle, disp, 18G X 1\" MISC 1 applicator every 7 days 100 each 0     Needle, Disp, 23G X 1\" MISC 1 applicator every 7 days 100 each 0     Syringe, Disposable, (SYRINGE LUER LOCK) 3 ML MISC 1 applicator every 7 days 100 each 0     testosterone cypionate (DEPOTESTOSTERONE) 200 MG/ML injection Inject 0.3 mLs (60 mg) into the muscle once a week 4 mL 0       History   Smoking Status     Never Smoker   Smokeless Tobacco     Never Used        No Known Allergies    Problem, Medication and Allergy Lists were reviewed and are current..         Review of Systems:        General    Fat redistribution: YES    Weight change: YES HEENT    Voice change: YES     Cardiovascular (CV)    Chest Pains: no    Shortness of breath: no " "Chest    Decreased exercise tolerance:  no       Gastrointestinal (GI)    Abdominal pain: no    Change in appetite: no Skin    Acne or oily skin: YES    Change in hair: YES     Genitourinary ()    Abnormal vaginal bleeding: mirena in, LMP: 2014     Change in libido: no    New sexual partners: no Musculoskeletal    Leg pain or swelling: no     Psychiatric (Psych)    Depression: been good    Anxiety/Panic: yes, has been generally good    Mood:  \"content\"                    Physical Exam:     Vitals:    01/20/20 1304   BP: 120/80   Pulse: 62   Resp: 16   Temp: 98  F (36.7  C)   TempSrc: Oral   SpO2: 96%   Weight: 85 kg (187 lb 6.4 oz)   Height: 1.72 m (5' 7.72\")     BMI= Body mass index is 28.73 kg/m .   Wt Readings from Last 10 Encounters:   01/20/20 85 kg (187 lb 6.4 oz)   12/05/19 82.2 kg (181 lb 3.2 oz)   08/20/19 81.5 kg (179 lb 9.6 oz)   07/23/19 79.6 kg (175 lb 6.4 oz)     Appearance: Male appearance and dress    GENERAL:: healthy, alert and no distress  RESP: no respiratory distress  CV: perfusing well  Psych: Alert and oriented times 3; coherent speech, normal rate and volume, able to articulate logical thoughts, able to abstract reason, no tangential thoughts, no hallucinations or delusions. Affect is full.           Labs:   Reviewed prior lavs:  Testosterone Total   Date Value Ref Range Status   12/05/2019 305 240 - 950 ng/dL Final     Comment:     This test was developed and its performance characteristics determined by the   [facility],  Special [facility]. It has   not been cleared or approved by the FDA. The laboratory is regulated under   CLIA as qualified to perform high-complexity testing. This test is used for   clinical purposes. It should not be regarded as investigational or for   research.       Recent Labs   Lab Test 12/05/19  0834 07/29/19  0954   CHOL 272* 246.6*   HDL 44 44.6   * 182*   TRIG 243* 98.2   CHOLHDLRATIO  --  5.5*         Assessment " "and Plan     Luis is a transmale here today for HRT follow-up, doing well on 60 mg T weekly. Lab check 1 month prior showed T at 300, patient has been dropped down to this low range as he is happy with the changes he has seen and has been on meds for 2.5 years. At this time desires hysterectomy, consult placed.     C/b high cholesterol, worsening. Does have fhx, but T likely exacerbating. Has been working on diet without results. Not fasting today, will check fasting lipid cascade this week. If >160, given his family history will start atorvastatin 10mg.    Diagnoses and all orders for this visit:    Gender dysphoria in adult  -     COMPREHENSIVE GENDER CARE REFERRAL - INTERNAL  -     needle, disp, 18G X 1\" MISC; 1 applicator every 7 days  -     Needle, Disp, 23G X 1\" MISC; 1 applicator every 7 days  -     Syringe, Disposable, (SYRINGE LUER LOCK) 3 ML MISC; 1 applicator every 7 days  -     testosterone cypionate (DEPOTESTOSTERONE) 200 MG/ML injection; Inject 0.3 mLs (60 mg) into the muscle once a week    High cholesterol  -     Lipid Cascade ([facility]); Future    Family history of hyperlipidemia    HM  tdap and HPV today    Contraception:   IUD, due for removal 2022  .   Counselled patient about controlled substances:    Follow up:  Follow up in 3 months with Dr. Brewster  Results by scarlett  Questions were elicited and answered.     Erika Lopez MD  "

## 2020-01-21 ENCOUNTER — TELEPHONE (OUTPATIENT)
Dept: PLASTIC SURGERY | Facility: CLINIC | Age: 25
End: 2020-01-21

## 2020-01-22 ENCOUNTER — TELEPHONE (OUTPATIENT)
Dept: PLASTIC SURGERY | Facility: CLINIC | Age: 25
End: 2020-01-22

## 2020-01-22 NOTE — TELEPHONE ENCOUNTER
[facility]:  Care Coordination Note     SITUATION   Luis Epstein(he/Him) is a 24 year old adult who is receiving support for:  Clinic Care Coordination - Follow-up  .    BACKGROUND     Pt was referred by Erika Lopez MD for hysterectomy consult.    In Boxed to [facility] to schedule consult    ASSESSMENT     Surgery              [facility] Assessment  Comprehensive Gender Care ([facility]) Enrollment: Enrolled  Patient has a therapist: Yes  Letter of support #1: Requested  Letter of support #2: Requested  Surgery being considered: Yes          PLAN         Follow-up plan:  Pt to schedule consult with VANESSA Melgoza

## 2020-01-23 ASSESSMENT — ENCOUNTER SYMPTOMS
NECK MASS: 0
ARTHRALGIAS: 1
SINUS PAIN: 0
NECK PAIN: 1
STIFFNESS: 0
SMELL DISTURBANCE: 0
ALTERED TEMPERATURE REGULATION: 0
POLYPHAGIA: 0
HALLUCINATIONS: 0
FEVER: 0
BACK PAIN: 1
WEIGHT GAIN: 0
DECREASED APPETITE: 0
POLYDIPSIA: 0
WEIGHT LOSS: 0
PANIC: 1
NIGHT SWEATS: 0
NERVOUS/ANXIOUS: 1
INSOMNIA: 0
MUSCLE CRAMPS: 0
SORE THROAT: 1
MYALGIAS: 1
TASTE DISTURBANCE: 0
TROUBLE SWALLOWING: 0
JOINT SWELLING: 0
INCREASED ENERGY: 0
MUSCLE WEAKNESS: 0
FATIGUE: 1
DECREASED CONCENTRATION: 1
DEPRESSION: 1
SINUS CONGESTION: 1
HOARSE VOICE: 0
CHILLS: 0

## 2020-01-23 ASSESSMENT — ANXIETY QUESTIONNAIRES
GAD7 TOTAL SCORE: 11
5. BEING SO RESTLESS THAT IT IS HARD TO SIT STILL: SEVERAL DAYS
2. NOT BEING ABLE TO STOP OR CONTROL WORRYING: MORE THAN HALF THE DAYS
7. FEELING AFRAID AS IF SOMETHING AWFUL MIGHT HAPPEN: SEVERAL DAYS
1. FEELING NERVOUS, ANXIOUS, OR ON EDGE: MORE THAN HALF THE DAYS
3. WORRYING TOO MUCH ABOUT DIFFERENT THINGS: MORE THAN HALF THE DAYS
7. FEELING AFRAID AS IF SOMETHING AWFUL MIGHT HAPPEN: SEVERAL DAYS
6. BECOMING EASILY ANNOYED OR IRRITABLE: SEVERAL DAYS
GAD7 TOTAL SCORE: 11
4. TROUBLE RELAXING: MORE THAN HALF THE DAYS

## 2020-01-24 ENCOUNTER — DOCUMENTATION ONLY (OUTPATIENT)
Dept: OBGYN | Facility: CLINIC | Age: 25
End: 2020-01-24

## 2020-01-24 DIAGNOSIS — Z78.9 TRANSGENDER: Primary | ICD-10-CM

## 2020-01-24 DIAGNOSIS — E78.00 HIGH CHOLESTEROL: ICD-10-CM

## 2020-01-24 LAB
CHOLEST SERPL-MCNC: 203.3 MG/DL (ref 0–200)
CHOLEST/HDLC SERPL: 5.3 {RATIO} (ref 0–5)
HDLC SERPL-MCNC: 38.2 MG/DL
LDLC SERPL CALC-MCNC: 140 MG/DL (ref 0–129)
TRIGL SERPL-MCNC: 127.4 MG/DL (ref 0–150)
VLDL CHOLESTEROL: 25.5 MG/DL (ref 7–32)

## 2020-01-24 RX ORDER — CEFAZOLIN SODIUM 2 G/100ML
2 INJECTION, SOLUTION INTRAVENOUS
Status: CANCELLED | OUTPATIENT
Start: 2020-01-24

## 2020-01-24 RX ORDER — CEFAZOLIN SODIUM 1 G/3ML
1 INJECTION, POWDER, FOR SOLUTION INTRAMUSCULAR; INTRAVENOUS SEE ADMIN INSTRUCTIONS
Status: CANCELLED | OUTPATIENT
Start: 2020-01-24

## 2020-01-24 ASSESSMENT — ANXIETY QUESTIONNAIRES: GAD7 TOTAL SCORE: 11

## 2020-01-27 ENCOUNTER — TELEPHONE (OUTPATIENT)
Dept: OBGYN | Facility: CLINIC | Age: 25
End: 2020-01-27

## 2020-01-31 ENCOUNTER — OFFICE VISIT (OUTPATIENT)
Dept: OBGYN | Facility: CLINIC | Age: 25
End: 2020-01-31
Attending: FAMILY MEDICINE
Payer: COMMERCIAL

## 2020-01-31 VITALS
BODY MASS INDEX: 28.97 KG/M2 | SYSTOLIC BLOOD PRESSURE: 119 MMHG | DIASTOLIC BLOOD PRESSURE: 77 MMHG | HEART RATE: 70 BPM | WEIGHT: 184.6 LBS | HEIGHT: 67 IN

## 2020-01-31 DIAGNOSIS — F64.0 GENDER DYSPHORIA IN ADULT: ICD-10-CM

## 2020-01-31 PROCEDURE — G0463 HOSPITAL OUTPT CLINIC VISIT: HCPCS | Mod: ZF

## 2020-01-31 ASSESSMENT — ANXIETY QUESTIONNAIRES
GAD7 TOTAL SCORE: 11
2. NOT BEING ABLE TO STOP OR CONTROL WORRYING: MORE THAN HALF THE DAYS
6. BECOMING EASILY ANNOYED OR IRRITABLE: SEVERAL DAYS
5. BEING SO RESTLESS THAT IT IS HARD TO SIT STILL: MORE THAN HALF THE DAYS
1. FEELING NERVOUS, ANXIOUS, OR ON EDGE: SEVERAL DAYS
7. FEELING AFRAID AS IF SOMETHING AWFUL MIGHT HAPPEN: SEVERAL DAYS
3. WORRYING TOO MUCH ABOUT DIFFERENT THINGS: MORE THAN HALF THE DAYS

## 2020-01-31 ASSESSMENT — PATIENT HEALTH QUESTIONNAIRE - PHQ9
5. POOR APPETITE OR OVEREATING: MORE THAN HALF THE DAYS
SUM OF ALL RESPONSES TO PHQ QUESTIONS 1-9: 12

## 2020-01-31 ASSESSMENT — MIFFLIN-ST. JEOR: SCORE: 1790.97

## 2020-01-31 NOTE — PROGRESS NOTES
"[facility]  Gynecology Visit    Reason for Consult: Gender-affirming surgery  Consulting Provider: Erika Lopez MD    HPI:    Luis Epstein is a 24 year old transgender man (He/Him) here for consult regarding gender-affirming surgery with hysterectomy. He has been on hormonal supplementation with Testosterone for 2.5 years. He is s/p top surgery in 2016. He has a Mirena in place, which was placed in 2015    Today would like to talk about process to schedule hysterectomy, mode of hysterectomy and whether or not to take ovaries.    GYN History  - LMP: on testosterone, last \"real\" period in 2013, occasional spotting. Mirena in place since 2015  - Menses: heavy and severe cramping with associated migraines. First menses at age 10  - Pap Smears: Patient reports he has had a pap smear with PCP, I cannot find this information. He has a visit coming up and will ask about this  - Contraception: Mirena in place, 2015  - Sexual Activity/Concerns: No concerns at this time, occasionally sexually active with vaginal penetrative intercourse. No history of STI, currently receiving HPV vaccines    OBHx  OB History   No obstetric history on file.       PMHx: Acne, Depression,   PSHx: Top surgery in [place] in 2016, no abdominal surgeries  Meds:   Current Outpatient Medications   Medication     Eletriptan Hydrobromide (RELPAX PO)     FLUoxetine 20 MG tablet     testosterone cypionate (DEPOTESTOSTERONE) 200 MG/ML injection     levonorgestrel (MIRENA) 20 MCG/24HR IUD     needle, disp, 18G X 1\" MISC     Needle, Disp, 23G X 1\" MISC     Syringe, Disposable, (SYRINGE LUER LOCK) 3 ML MISC     No current facility-administered medications for this visit.      Allergies:  No known    SocHx:  denies tobacco use, occasional alcohol. In school to get teaching license.    FamHx:  Discussed and noncontributory    ROS: ROS is negative, no chest pain, SOB, abdominal pain, changes in bowel or bladder, no new vaginal discharge just occasional " "spotting on Mirena. No concern for infection at this time. He does have a \"cold\" which he's had for the past week and recently lost his voice. Denies fevers and chills at this time, thinks it's getting better    Physical Exam  /77 (BP Location: Right arm, Patient Position: Chair)   Pulse 70   Ht 1.702 m (5' 7\")   Wt 83.7 kg (184 lb 9.6 oz)   LMP  (LMP Unknown)   BMI 28.91 kg/m    Gen: Well-appearing, NAD  HEENT: Normocephalic, atraumatic  Pelvic exam: deferred at this time, will f/u with PCP if hasn't had a pap smear    Assessment/Plan:  Luis Epstein is a 24 year old transgender man who is here today to discuss gender-affirmed surgery. Has been transitioning with hormone replacement for 2.5 years and desires hysterectomy    Discussed modes of hysterectomy including vaginal, laparoscopic, robotic, abdominal. Discussed risks and benefits of each and recommend laparoscopic with or w/o robotic assistance hysterectomy. Discussed risks and benefits of bilateral oophorectomy and he desires oophorectomy at time of surgery. No desire for fertility preservation. At this time, he needs to work on obtaining 2 letters of support and making sure insurance coverage is set. Hoping to schedule surgery for the summer when he is out of classes  - Plan follow up visit when he is more aware of possible scheduling. Luis can reach out via [employer] when he has a better idea about scheduling surgery.  - Encourage patient to look into whether he has had a pap smear. Discussed that we could go ahead with hysterectomy w/o pap knowing that there is a small change there could be occult cervical cancer and he may not have correct hysterectomy (simple vs radical hyst). He is going to ask his PCP about pap at next visit    Patient would like to make his mother, Brandie Schuster,s a contact and that she is ok to receive medical information for patient.    Return to clinic PRN    Staffed with Dr. Chanelle Pandya MD  Obstetrics and " Gyncology, PGY-3  January 31, 2020 , 12:52 PM      The Patient was seen in [facility] Continuity Clinic by KARMEN MUNOZ.  I reviewed the history & exam. Assessment and plan were jointly made.    Milly Roca MD

## 2020-01-31 NOTE — LETTER
"1/31/2020       RE: Luis Epstein  [address]  [place] [address]     Dear Colleague,    Thank you for referring your patient, Luis Epstein, to the [facility] at [facility]. Please see a copy of my visit note below.    [facility]  Gynecology Visit    Reason for Consult: Gender-affirming surgery  Consulting Provider: Erika Lopez MD    HPI:    Luis Epstein is a 24 year old transgender man (He/Him) here for consult regarding gender-affirming surgery with hysterectomy. He has been on hormonal supplementation with Testosterone for 2.5 years. He is s/p top surgery in 2016. He has a Mirena in place, which was placed in 2015    Today would like to talk about process to schedule hysterectomy, mode of hysterectomy and whether or not to take ovaries.    GYN History  - LMP: on testosterone, last \"real\" period in 2013, occasional spotting. Mirena in place since 2015  - Menses: heavy and severe cramping with associated migraines. First menses at age 10  - Pap Smears: Patient reports he has had a pap smear with PCP, I cannot find this information. He has a visit coming up and will ask about this  - Contraception: Mirena in place, 2015  - Sexual Activity/Concerns: No concerns at this time, occasionally sexually active with vaginal penetrative intercourse. No history of STI, currently receiving HPV vaccines    OBHx  OB History   No obstetric history on file.       PMHx: Acne, Depression,   PSHx: Top surgery in [place] in 2016, no abdominal surgeries  Meds:   Current Outpatient Medications   Medication     Eletriptan Hydrobromide (RELPAX PO)     FLUoxetine 20 MG tablet     testosterone cypionate (DEPOTESTOSTERONE) 200 MG/ML injection     levonorgestrel (MIRENA) 20 MCG/24HR IUD     needle, disp, 18G X 1\" MISC     Needle, Disp, 23G X 1\" MISC     Syringe, Disposable, (SYRINGE LUER LOCK) 3 ML MISC     No current facility-administered medications for this " "visit.      Allergies:  No known    SocHx:  denies tobacco use, occasional alcohol. In school to get teaching license.    FamHx:  Discussed and noncontributory    ROS: ROS is negative, no chest pain, SOB, abdominal pain, changes in bowel or bladder, no new vaginal discharge just occasional spotting on Mirena. No concern for infection at this time. He does have a \"cold\" which he's had for the past week and recently lost his voice. Denies fevers and chills at this time, thinks it's getting better    Physical Exam  /77 (BP Location: Right arm, Patient Position: Chair)   Pulse 70   Ht 1.702 m (5' 7\")   Wt 83.7 kg (184 lb 9.6 oz)   LMP  (LMP Unknown)   BMI 28.91 kg/m     Gen: Well-appearing, NAD  HEENT: Normocephalic, atraumatic  Pelvic exam: deferred at this time, will f/u with PCP if hasn't had a pap smear    Assessment/Plan:  Luis Epstein is a 24 year old transgender man who is here today to discuss gender-affirmed surgery. Has been transitioning with hormone replacement for 2.5 years and desires hysterectomy    Discussed modes of hysterectomy including vaginal, laparoscopic, robotic, abdominal. Discussed risks and benefits of each and recommend laparoscopic with or w/o robotic assistance hysterectomy. Discussed risks and benefits of bilateral oophorectomy and he desires oophorectomy at time of surgery. No desire for fertility preservation. At this time, he needs to work on obtaining 2 letters of support and making sure insurance coverage is set. Hoping to schedule surgery for the summer when he is out of classes  - Plan follow up visit when he is more aware of possible scheduling. Luis can reach out via nivio when he has a better idea about scheduling surgery.  - Encourage patient to look into whether he has had a pap smear. Discussed that we could go ahead with hysterectomy w/o pap knowing that there is a small change there could be occult cervical cancer and he may not have correct hysterectomy (simple " vs radical hyst). He is going to ask his PCP about pap at next visit    Patient would like to make his mother, Brandie Some,s a contact and that she is ok to receive medical information for patient.    Return to clinic PRN    Staffed with Dr. Chanelle Pandya MD  Obstetrics and Gyncology, PGY-3  January 31, 2020 , 12:52 PM      The Patient was seen in Resident Continuity Clinic by KARMEN PANDYA.  I reviewed the history & exam. Assessment and plan were jointly made.    MD Karmen Roche MD

## 2020-01-31 NOTE — PATIENT INSTRUCTIONS
Please schedule appointment for the summer when you are ready for  scheduling surgery. Often it takes time to schedule surgeries over the summer, as soon as you know your schedule please call or send me a kajeet message to start the scheduling process.     In the meantime, work to obtain letters. Romulo Brower is the go-to person to help us coordinate surgeries and insurance if you have not been in touch with him.    Please ask your Primary care physician about a Pap smear, they can send us the results if they have done it. If you haven't had one, I would recommend one before surgery.

## 2020-02-06 ENCOUNTER — TELEPHONE (OUTPATIENT)
Dept: OBGYN | Facility: CLINIC | Age: 25
End: 2020-02-06

## 2020-02-10 ENCOUNTER — TELEPHONE (OUTPATIENT)
Dept: OBGYN | Facility: CLINIC | Age: 25
End: 2020-02-10

## 2020-03-05 ENCOUNTER — PATIENT OUTREACH (OUTPATIENT)
Dept: PLASTIC SURGERY | Facility: CLINIC | Age: 25
End: 2020-03-05

## 2020-03-05 NOTE — PROGRESS NOTES
Called pt back, LVM. Pt called asking about letters of support and what he needed for his hysterectomy.     Romulo Brower, SW  Transgender Care Coordinator

## 2020-03-06 DIAGNOSIS — F64.0 GENDER DYSPHORIA IN ADULT: ICD-10-CM

## 2020-03-06 RX ORDER — TESTOSTERONE CYPIONATE 200 MG/ML
60 INJECTION, SOLUTION INTRAMUSCULAR WEEKLY
Qty: 4 ML | Refills: 0 | Status: SHIPPED | OUTPATIENT
Start: 2020-03-06 | End: 2020-03-31

## 2020-03-06 NOTE — TELEPHONE ENCOUNTER
"Request for medication refill: Testosterone     Providers if patient needs an appointment and you are willing to give a one month supply please refill for one month and  send a letter/MyChart using \".SMILLIMITEDREFILL\" .smillimited and route chart to \"P SMI \" (Giving one month refill in non controlled medications is strongly recommended before denial)    If refill has been denied, meaning absolutely no refills without visit, please complete the smart phrase \".smirxrefuse\" and route it to the \"P SMI MED REFILLS\"  pool to inform the patient and the pharmacy.    Anjana Ventura, CMA        "

## 2020-03-06 NOTE — TELEPHONE ENCOUNTER
RN called and left message for patient that a refill (did not disclose medication name) is being sent over for them and we need to see them in about a month. Left name and callback number for questions.     Routing to preceptor to e-prescribe this please. Thank you.  Joann Lance RN

## 2020-03-11 ENCOUNTER — HEALTH MAINTENANCE LETTER (OUTPATIENT)
Age: 25
End: 2020-03-11

## 2020-03-24 DIAGNOSIS — F32.1 CURRENT MODERATE EPISODE OF MAJOR DEPRESSIVE DISORDER, UNSPECIFIED WHETHER RECURRENT (H): ICD-10-CM

## 2020-03-24 NOTE — TELEPHONE ENCOUNTER
"Request for medication refill: Fluoxetine     Providers if patient needs an appointment and you are willing to give a one month supply please refill for one month and  send a letter/MyChart using \".SMILLIMITEDREFILL\" .smillimited and route chart to \"P SMI \" (Giving one month refill in non controlled medications is strongly recommended before denial)    If refill has been denied, meaning absolutely no refills without visit, please complete the smart phrase \".smirxrefuse\" and route it to the \"P SMI MED REFILLS\"  pool to inform the patient and the pharmacy.    Anjana Ventura, [facility]        "

## 2020-03-25 RX ORDER — FLUOXETINE 20 MG/1
30 TABLET, FILM COATED ORAL DAILY
Qty: 30 TABLET | Refills: 4 | Status: SHIPPED | OUTPATIENT
Start: 2020-03-25 | End: 2020-03-31

## 2020-03-31 ENCOUNTER — VIRTUAL VISIT (OUTPATIENT)
Dept: FAMILY MEDICINE | Facility: CLINIC | Age: 25
End: 2020-03-31
Payer: COMMERCIAL

## 2020-03-31 DIAGNOSIS — F64.0 GENDER DYSPHORIA IN ADULT: ICD-10-CM

## 2020-03-31 DIAGNOSIS — Z97.5 IUD (INTRAUTERINE DEVICE) IN PLACE: ICD-10-CM

## 2020-03-31 DIAGNOSIS — F32.1 CURRENT MODERATE EPISODE OF MAJOR DEPRESSIVE DISORDER, UNSPECIFIED WHETHER RECURRENT (H): ICD-10-CM

## 2020-03-31 RX ORDER — FLUOXETINE 20 MG/1
30 TABLET, FILM COATED ORAL DAILY
Qty: 30 TABLET | Refills: 4 | Status: SHIPPED | OUTPATIENT
Start: 2020-03-31 | End: 2021-01-21

## 2020-03-31 RX ORDER — TESTOSTERONE CYPIONATE 200 MG/ML
60 INJECTION, SOLUTION INTRAMUSCULAR WEEKLY
Qty: 4 ML | Refills: 5 | Status: SHIPPED | OUTPATIENT
Start: 2020-03-31 | End: 2021-01-21

## 2020-03-31 ASSESSMENT — PATIENT HEALTH QUESTIONNAIRE - PHQ9: SUM OF ALL RESPONSES TO PHQ QUESTIONS 1-9: 11

## 2020-03-31 NOTE — PROGRESS NOTES
Preceptor Attestation:   Patient discussed with the resident. Assessment and plan reviewed with resident and agreed upon.   Supervising Physician:  Ron Hood MD  [facility]

## 2020-03-31 NOTE — PROGRESS NOTES
"Family Medicine Telephone Visit Note       Telephone Visit Consent   Patient was verbally read the following and verbal consent was obtained.  \"I understand that I may revoke this request for a phone visit at any time.  This consent will automatically  3 months from the signed date and time.\"    Name person giving consent:  Patient  Date verbal consent given:  3/31/2020  Time verbal consent given:  8:32 AM    Chief Complaint   Patient presents with     Follow Up     3 month medication f/u     Refill Request     Testosterone, All syringes & Fluoxetine      Current Outpatient Medications   Medication Sig Dispense Refill     Eletriptan Hydrobromide (RELPAX PO)        FLUoxetine 20 MG tablet Take 1.5 tablets (30 mg) by mouth daily 30 tablet 4     levonorgestrel (MIRENA) 20 MCG/24HR IUD 1 each by Intrauterine route once       needle, disp, 18G X 1\" MISC 1 applicator every 7 days 100 each 0     Needle, Disp, 23G X 1\" MISC 1 applicator every 7 days 100 each 0     Syringe, Disposable, (SYRINGE LUER LOCK) 3 ML MISC 1 applicator every 7 days 100 each 0     testosterone cypionate (DEPOTESTOSTERONE) 200 MG/ML injection Inject 0.3 mLs (60 mg) into the muscle once a week 4 mL 0     No Known Allergies         HPI   Patients name: Luis  Appointment start time:  8:46 AM    -Depression/Anxiety follow up      Your mood since your last visit: No change, covid times are strange. Moved twice in the past 2 weeks and that's stressful. Likes new house with college friends.     Medication? Fluoxetine 30 mg, likes that dose     Therapy? Seeing therapist every other week- by telephone/video call.    Exercise? Doing a lot of organizing. Still doing online classes for grad school. 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     Other associated symptoms :None    How is your sleep? Good    New significant life event:Yes-  Moving. covid times stressful.     Current substance use: Alcohol " "very occasionally     Anxiety / Panic symptoms: Yes-  hyperfixating on things he doesn't have control over      Recent PHQ-9 Scores:  PHQ-9 SCORE 8/20/2019 1/31/2020 3/31/2020   PHQ-9 Total Score 10 12 11       Today's PHQ-9 reviewed and it is 11 same  No SI or HI. Feels safe.     Adherence and Exercise  Medication side effects: no  How often is a medication missed? Never     Luis is a 24 year old individual that uses pronouns He/Him/His/Himself that presents today for follow up of:  masculinizing hormone therapy.      Been on Testosterone for almost 3 years. Has been on 60mg weekly (wed injection) for about 1 year, happy with current place, does not want to change dose today, last T was in the 300s. Has seen all the changes hoped for.    Saw OB/GYN and will be waiting probably until next year. Has not seen a gender affirming therapist.   Working on one letter now.      Mirena placed: placed 2015. Not having sex with anyone with penis.      Gender affirmation is being sought in these other ways: desires hysterectomy,s/p top surgery.   And hormones    Needs Testosterone, syringes   Fluoxetine refill      Assessment and Plan   Luis was seen today for follow up, refill request and depression screening.    Diagnoses and all orders for this visit:    Gender dysphoria in adult  Stable, no concerns. Encouraged patient to get two letters for hysterectomy, working on it.   -     testosterone cypionate (DEPOTESTOSTERONE) 200 MG/ML injection; Inject 0.3 mLs (60 mg) into the muscle once a week  -     Syringe, Disposable, (SYRINGE LUER LOCK) 3 ML MISC; 1 applicator every 7 days  -     Needle, Disp, 23G X 1\" MISC; 1 applicator every 7 days  -     needle, disp, 18G X 1\" MISC; 1 applicator every 7 days    Current moderate episode of major depressive disorder, unspecified whether recurrent (H)  Stable. PHQ-9 score is 11, no SI or HI, feels safe. Refill.   -     FLUoxetine 20 MG tablet; Take 1.5 tablets (30 mg) by mouth " daily    IUD (intrauterine device) in place  mirena placed in 04/2015, not having sex with male partners.     Refilled medications that would be required in the next 3 months.     Follow up in 3 months.    After Visit Information:  Patient chose to view AVS via [employer]    Appointment end time: 8:58 AM   This is a telephone visit that took 12 minutes.    Clinician location:  DARLYN Trinidad

## 2020-12-04 NOTE — PROGRESS NOTES
"       JENNIFER Epstein is a 24 year old  who presents for   Chief Complaint   Patient presents with     Chest Pain     [facility] 3-4 days[facility] pt feeling tightness and sharp pain when taking in deep breath, pain rate 4/10     Fatigue     x 2weeks, pt feels stressed     Chest pain  4 days ago. Comes and goes.   Worse with anxiety, deep breaths. Non-exertional (actually improves with exertion). Non-positional.   Sternal. Non-tender to touch.   Seen at urgent care Tuesday - got EKG which was normal.   Sick last week, cough, rhinorrhea. No fevers, rash diarrhea, abdominal pain.  Pain unchaneged over 4 days.  No SOB but deep breaths with discomfort.    Fatigue  2 weeks - stress, suicide in family.   Grad school - finals right now, with full time job.   Sleep okay, wishes more but 7-8 hours.  Thinks this is situational and should resolve.   Just wanted to make sure nothing else wrong.    Problem, Medication and Allergy Lists were reviewed and updated if needed..    Patient is an established patient of this clinic..         Review of Systems:   Review of Systems   Constitutional: Positive for fatigue. Negative for fever.   HENT: Positive for rhinorrhea. Negative for sore throat.    Respiratory: Positive for cough.    Cardiovascular: Negative for palpitations.   Gastrointestinal: Negative for abdominal pain.            Physical Exam:     Vitals:    12/05/19 0811   BP: 119/73   Pulse: 76   Resp: 16   Temp: 97.7  F (36.5  C)   TempSrc: Oral   SpO2: 100%   Weight: 82.2 kg (181 lb 3.2 oz)   Height: 1.75 m (5' 8.9\")     Body mass index is 26.84 kg/m .  Vitals were reviewed and were normal     Physical Exam  Constitutional:       General: He is not in acute distress.     Appearance: He is well-developed. He is not diaphoretic.   HENT:      Head: Normocephalic and atraumatic.   Eyes:      Conjunctiva/sclera: Conjunctivae normal.   Neck:      Musculoskeletal: Normal range of motion. "   Cardiovascular:      Rate and Rhythm: Normal rate and regular rhythm.      Heart sounds: No murmur.   Pulmonary:      Effort: Pulmonary effort is normal. No respiratory distress.      Breath sounds: No wheezing or rales.   Chest:      Chest wall: No tenderness.   Musculoskeletal: Normal range of motion.   Neurological:      Mental Status: He is alert and oriented to person, place, and time.           Results:   Results are ordered and pending    Assessment and Plan        Luis was seen today for chest pain and fatigue.    Diagnoses and all orders for this visit:    Other chest pain  Most likely pleuritic chest pain 2/2 recent URI. Cardiopulmonary exam and vitals today reassuring. EKG 2 days ago at urgent care without concern. No FH of early ACS. Given findings today, low suspicion for ACS, PE. Other consideration is pericarditis though history not very consistent with this.   Anticipatory guidance was given, red flag symptoms discussed. Patient to return if these were to occur.  Continue ibuprofen prn for pain.    Fatigue, unspecified type  Likely stress-related. Will add on TSH today. Will also obtain CBC and TT which certainly could reveal an etiology for fatigue, but will be obtained as part of HRT lab monitoring regardless.   -     Testosterone total  -     CBC with Diff Plt ([facility])  -     TSH with free T4 reflex  -     Comprehensive metabolic panel  -     Lipid panel reflex to direct LDL Fasting    Gender dysphoria in adult  -     Cancel: Lipid [place] ([facility])  -     Testosterone total  -     CBC with Diff Plt ([facility])  -     Cancel: Comprehensive Metabolic Panel ([facility])  -     Comprehensive metabolic panel  -     Lipid panel reflex to direct LDL Fasting       There are no discontinued medications.    Options for treatment and follow-up care were reviewed with the patient. Luis Epstein  engaged in the decision making process and verbalized understanding of the options discussed and agreed with the  final plan.    Chrystal Mendoza MD   hysterectomy

## 2021-01-03 ENCOUNTER — HEALTH MAINTENANCE LETTER (OUTPATIENT)
Age: 26
End: 2021-01-03

## 2021-01-21 ENCOUNTER — OFFICE VISIT (OUTPATIENT)
Dept: FAMILY MEDICINE | Facility: CLINIC | Age: 26
End: 2021-01-21
Payer: COMMERCIAL

## 2021-01-21 VITALS
TEMPERATURE: 98.5 F | BODY MASS INDEX: 30.79 KG/M2 | DIASTOLIC BLOOD PRESSURE: 81 MMHG | RESPIRATION RATE: 16 BRPM | OXYGEN SATURATION: 96 % | HEART RATE: 68 BPM | WEIGHT: 196.6 LBS | SYSTOLIC BLOOD PRESSURE: 129 MMHG

## 2021-01-21 DIAGNOSIS — F32.1 CURRENT MODERATE EPISODE OF MAJOR DEPRESSIVE DISORDER, UNSPECIFIED WHETHER RECURRENT (H): ICD-10-CM

## 2021-01-21 DIAGNOSIS — G43.709 CHRONIC MIGRAINE WITHOUT AURA WITHOUT STATUS MIGRAINOSUS, NOT INTRACTABLE: Primary | ICD-10-CM

## 2021-01-21 DIAGNOSIS — Z13.9 SCREENING FOR CONDITION: ICD-10-CM

## 2021-01-21 DIAGNOSIS — F64.0 GENDER DYSPHORIA IN ADULT: ICD-10-CM

## 2021-01-21 LAB
% GRANULOCYTES: 63.6 %G (ref 40–75)
ALBUMIN SERPL-MCNC: 4.8 MG/DL (ref 3.8–5)
ALP SERPL-CCNC: 77 U/L (ref 31.7–110.7)
ALT SERPL-CCNC: 45.1 U/L (ref 0–45)
AST SERPL-CCNC: 28.7 U/L (ref 0–55)
BILIRUB SERPL-MCNC: 0.8 MG/DL (ref 0.2–1.3)
BUN SERPL-MCNC: 17.7 MG/DL (ref 7–21)
CALCIUM SERPL-MCNC: 9.7 MG/DL (ref 8.5–10.1)
CHLORIDE SERPLBLD-SCNC: 104.4 MMOL/L (ref 98–110)
CHOLEST SERPL-MCNC: 298.1 MG/DL (ref 0–200)
CHOLEST/HDLC SERPL: 7.1 {RATIO} (ref 0–5)
CO2 SERPL-SCNC: 26.6 MMOL/L (ref 20–32)
CREAT SERPL-MCNC: 1 MG/DL (ref 0.7–1.3)
GFR SERPL CREATININE-BSD FRML MDRD: >90 ML/MIN/1.7 M2
GLUCOSE SERPL-MCNC: 125.8 MG'DL (ref 70–99)
GRANULOCYTES #: 3.1 K/UL (ref 1.6–8.3)
HCT VFR BLD AUTO: 48.2 % (ref 40–53)
HDLC SERPL-MCNC: 42.3 MG/DL
HEMOGLOBIN: 16.3 G/DL (ref 13.3–17.7)
LDLC SERPL CALC-MCNC: 223 MG/DL (ref 0–129)
LYMPHOCYTES # BLD AUTO: 1.5 K/UL (ref 0.8–5.3)
LYMPHOCYTES NFR BLD AUTO: 30.4 %L (ref 20–48)
MCH RBC QN AUTO: 31.2 PG (ref 26.5–35)
MCHC RBC AUTO-ENTMCNC: 33.8 G/DL (ref 32–36)
MCV RBC AUTO: 92.3 FL (ref 78–100)
MID #: 0.3 K/UL (ref 0–2.2)
MID %: 6 %M (ref 0–20)
PLATELET # BLD AUTO: 310 K/UL (ref 150–450)
POTASSIUM SERPL-SCNC: 3.8 MMOL/L (ref 3.3–4.5)
PROT SERPL-MCNC: 7.1 G/DL (ref 6.8–8.8)
RBC # BLD AUTO: 5.22 M/UL (ref 4.4–5.9)
SODIUM SERPL-SCNC: 138.8 MMOL/L (ref 132.6–141.4)
TRIGL SERPL-MCNC: 162.1 MG/DL (ref 0–150)
VLDL CHOLESTEROL: 32.4 MG/DL (ref 7–32)
WBC # BLD AUTO: 4.9 K/UL (ref 4–11)

## 2021-01-21 PROCEDURE — 36415 COLL VENOUS BLD VENIPUNCTURE: CPT | Performed by: FAMILY MEDICINE

## 2021-01-21 PROCEDURE — 99214 OFFICE O/P EST MOD 30 MIN: CPT | Mod: GC | Performed by: STUDENT IN AN ORGANIZED HEALTH CARE EDUCATION/TRAINING PROGRAM

## 2021-01-21 PROCEDURE — 80053 COMPREHEN METABOLIC PANEL: CPT | Performed by: STUDENT IN AN ORGANIZED HEALTH CARE EDUCATION/TRAINING PROGRAM

## 2021-01-21 PROCEDURE — 85025 COMPLETE CBC W/AUTO DIFF WBC: CPT | Performed by: STUDENT IN AN ORGANIZED HEALTH CARE EDUCATION/TRAINING PROGRAM

## 2021-01-21 PROCEDURE — 80061 LIPID PANEL: CPT | Performed by: STUDENT IN AN ORGANIZED HEALTH CARE EDUCATION/TRAINING PROGRAM

## 2021-01-21 PROCEDURE — 99000 SPECIMEN HANDLING OFFICE-LAB: CPT | Performed by: FAMILY MEDICINE

## 2021-01-21 PROCEDURE — 84403 ASSAY OF TOTAL TESTOSTERONE: CPT | Performed by: FAMILY MEDICINE

## 2021-01-21 RX ORDER — ELETRIPTAN HYDROBROMIDE 20 MG/1
20 TABLET, FILM COATED ORAL
Qty: 40 TABLET | Refills: 1 | Status: SHIPPED | OUTPATIENT
Start: 2021-01-21 | End: 2022-06-24

## 2021-01-21 RX ORDER — TESTOSTERONE CYPIONATE 200 MG/ML
60 INJECTION, SOLUTION INTRAMUSCULAR WEEKLY
Qty: 4 ML | Refills: 5 | Status: SHIPPED | OUTPATIENT
Start: 2021-01-21 | End: 2021-06-08

## 2021-01-21 RX ORDER — ELETRIPTAN HYDROBROMIDE 20 MG/1
20 TABLET, FILM COATED ORAL
Status: CANCELLED | OUTPATIENT
Start: 2021-01-21

## 2021-01-21 RX ORDER — FLUOXETINE 20 MG/1
80 TABLET, FILM COATED ORAL DAILY
Qty: 30 TABLET | Refills: 4 | COMMUNITY
Start: 2021-01-21 | End: 2021-06-08

## 2021-01-21 RX ORDER — ELETRIPTAN HYDROBROMIDE 20 MG/1
1 TABLET, FILM COATED ORAL
COMMUNITY
End: 2021-01-21

## 2021-01-21 NOTE — PATIENT INSTRUCTIONS
Patient Education     What Are Migraine and Tension Headaches?    Although there are several types of headaches, migraine and tension headaches affect the most people. When you have a headache, it isn't your brain that's hurting. Your head aches because nerves in the bones, blood vessels, meninges, and muscles of your head are irritated. These irritated nerves send pain signals to the brain, which identifies where you hurt and how bad the pain is.  Talk with your healthcare provider about a treatment plan that may help relieve pain and prevent future headaches.   What causes your headache?  The actual headache process is not yet understood. Only rarely are headaches a sign of a serious medical problem such as a tumor. Headache pain may be caused by abnormal interaction between the brain and the nerves and blood vessels in the head. A previous head injury or concussion, neck pain, environmental stresses, muscle tension, anxiety, depression, fatigue, skipping meals, or certain foods and drinks may trigger headache pain.  Brain scans are rarely needed and only for certain danger sign symptoms. CT scans are associated with potential radiation effects and potential inaccurate false findings.  What is referred pain?  Headache pain can be referred pain, which is pain that has its source in one place but is felt in another. For example, pain behind the eyes may actually be caused by tense muscles in the neck and shoulders. This means that the place that hurts may not be the part of the body that needs treatment.  Is it a migraine?  Migraine is a vascular headache that causes throbbing pain felt on one (most common) or both sides (less common) of the head. You may feel nauseated or vomit. This headache may also be preceded or associated with changes in sight (like seeing spots or flashes of light), ability to speak, or sensation (aura). There are a wide variety of environmental and food-related triggers for migraines. The  "pain may last for 4 to 72 hours. Afterward, you may feel shaky for a day or so. If this is the first time you experience these symptoms, you should immediately seek medical attention because you could be having a stroke.  Is it a tension headache?  This type of headache is usually a dull ache or a sensation of pressure on both sides of the head. It may be associated with pain or tension in the neck and shoulders. Depression, anxiety, and stress can cause a tension headache. The pain may not have a definite beginning or end. It may come and go, or seem never to go away.  When to call the healthcare provider  Call your healthcare provider for headaches that happen along with any of these symptoms:    Sudden, severe headache that is different from your usual headache pain    Headache associated with fever    Sudden headache associated with stiff neck    Slurred speech    Recurring headache in children     Ongoing numbness or muscle weakness    Loss of vision    Pain following a head injury    Convulsions, or a change in mental awareness    A headache you would call \"the worst headache you've ever had\"    New headaches in a pregnant woman   [employer] last reviewed this educational content on 1/1/2018 2000-2020 [employer]. [address] [place] [address]. All rights reserved. This information is not intended as a substitute for professional medical care. Always follow your healthcare professional's instructions.           "

## 2021-01-21 NOTE — PROGRESS NOTES
Preceptor Attestation:   Patient seen, evaluated and discussed with the resident. I have verified the content of the note, which accurately reflects my assessment of the patient and the plan of care.   Supervising Physician:  Helder Chilel MD

## 2021-01-21 NOTE — PROGRESS NOTES
Assessment & Plan     Chronic migraine without aura without status migrainosus, not intractable  Not well controlled. Ran out of prescription for triptan. Did well with this in the past. Discussed restarting with increased frequency of migraine headaches today. No concern for serotonin syndrome at this time with triptan and fluoxetine. Fluoxetine managed by psychiatrist. He has been on eletriptan for years and has responded well to this medication. Will continue today.   - eletriptan (RELPAX) 20 MG tablet; Take 1 tablet (20 mg) by mouth at onset of headache for migraine May repeat in 2 hours. Max 4 tablets/24 hours.    Current moderate episode of major depressive disorder, unspecified whether recurrent (H)  Stable, managed by psychiatry. Noted.   - FLUoxetine 20 MG tablet; Take 4 tablets (80 mg) by mouth daily    Gender dysphoria in adult  Stable. On testosterone for ~4 years. Yearly labs today as below.   - testosterone cypionate (DEPOTESTOSTERONE) 200 MG/ML injection; Inject 0.3 mLs (60 mg) into the muscle once a week  Screening for condition  HRT labs today.   - Comprehensive Metabolic Panel ([facility])  - CBC with Diff Plt ([facility])  - Lipid [place] ([facility])  - Testosterone total    Review of the result(s) of each unique test - 4    Return in about 6 months (around 7/21/2021).    Adrian Brewster MD  [facility]    I will review CMP, CBC, testosterone and lipid labs from this visit.     Dmitriy Smith is a 25 year old who presents to clinic today for the following health issues     HPI     Last visit 03/2020    Increased migraines  Used to get once/2 months  Now getting one/2 weeks  From increased stress  Recent move  Now student teaching  Wears prescription glasses - recently went to eye doctor    Last after waking up in the morning until midafternoon  Sharp pain then gets nauseated  Same type of pain from previous headaches  No vision changes  No hx of aura   Started having  headaches in middle school.   Does have light sensitivity and sound sensitivity     HRT follow up  Been on for 4 years  No side effects  Doing well     No concern for STDs   Having sex with person with uterus- fiance     Got HPV vaccination in [place]    Review of Systems   CONSTITUTIONAL: NEGATIVE for fever, chills, change in weight  ENT/MOUTH: NEGATIVE for ear, mouth and throat problems  RESP: NEGATIVE for significant cough  CV: NEGATIVE for chest pain, palpitations       Objective    /81 (BP Location: Right arm, Patient Position: Sitting, Cuff Size: Adult Regular)   Pulse 68   Temp 98.5  F (36.9  C) (Oral)   Resp 16   Wt 89.2 kg (196 lb 9.6 oz)   SpO2 96%   Breastfeeding No   BMI 30.79 kg/m    Body mass index is 30.79 kg/m .  Physical Exam   GENERAL: healthy, alert and no distress  RESP: lungs clear to auscultation - no rales, rhonchi or wheezes  CV: regular rate and rhythm, normal S1 S2,no murmur, no peripheral edema and peripheral pulses strong  ABDOMEN: soft, nontender, no hepatosplenomegaly, no masses  MS: no gross musculoskeletal defects noted, no edema  PSYCH: mentation appears normal, affect normal/bright    Results for orders placed or performed in visit on 01/21/21 (from the past 24 hour(s))   CBC with Diff Plt ([facility])   Result Value Ref Range    WBC 4.9 4.0 - 11.0 K/uL    Lymphocytes # 1.5 0.8 - 5.3 K/uL    % Lymphocytes 30.4 20.0 - 48.0 %L    Mid # 0.3 0.0 - 2.2 K/uL    Mid % 6.0 0.0 - 20.0 %M    GRANULOCYTES # 3.1 1.6 - 8.3 K/uL    % Granulocytes 63.6 40.0 - 75.0 %G    RBC 5.22 4.40 - 5.90 M/uL    Hemoglobin 16.3 13.3 - 17.7 g/dL    Hematocrit 48.2 40.0 - 53.0 %    MCV 92.3 78.0 - 100.0 fL    MCH 31.2 26.5 - 35.0 pg    MCHC 33.8 32.0 - 36.0 g/dL    Platelets 310.0 150.0 - 450.0 K/uL

## 2021-01-27 LAB — TESTOST SERPL-MCNC: 1022 NG/DL (ref 240–950)

## 2021-01-29 DIAGNOSIS — F64.0 GENDER DYSPHORIA IN ADULT: Primary | ICD-10-CM

## 2021-02-24 DIAGNOSIS — F64.0 GENDER DYSPHORIA IN ADULT: ICD-10-CM

## 2021-02-24 PROCEDURE — 80076 HEPATIC FUNCTION PANEL: CPT | Performed by: STUDENT IN AN ORGANIZED HEALTH CARE EDUCATION/TRAINING PROGRAM

## 2021-02-24 PROCEDURE — 36415 COLL VENOUS BLD VENIPUNCTURE: CPT | Performed by: STUDENT IN AN ORGANIZED HEALTH CARE EDUCATION/TRAINING PROGRAM

## 2021-02-24 PROCEDURE — 99000 SPECIMEN HANDLING OFFICE-LAB: CPT | Performed by: STUDENT IN AN ORGANIZED HEALTH CARE EDUCATION/TRAINING PROGRAM

## 2021-02-24 PROCEDURE — 84403 ASSAY OF TOTAL TESTOSTERONE: CPT | Mod: 90 | Performed by: STUDENT IN AN ORGANIZED HEALTH CARE EDUCATION/TRAINING PROGRAM

## 2021-02-25 ENCOUNTER — TELEPHONE (OUTPATIENT)
Dept: FAMILY MEDICINE | Facility: CLINIC | Age: 26
End: 2021-02-25

## 2021-02-25 DIAGNOSIS — F64.0 GENDER DYSPHORIA IN ADULT: Primary | ICD-10-CM

## 2021-02-25 LAB
ALBUMIN SERPL-MCNC: 4.1 G/DL (ref 3.4–5)
ALP SERPL-CCNC: 97 U/L (ref 40–150)
ALT SERPL W P-5'-P-CCNC: 53 U/L (ref 0–70)
AST SERPL W P-5'-P-CCNC: 23 U/L (ref 0–45)
BILIRUB DIRECT SERPL-MCNC: <0.1 MG/DL (ref 0–0.2)
BILIRUB SERPL-MCNC: 0.3 MG/DL (ref 0.2–1.3)
PROT SERPL-MCNC: 7.5 G/DL (ref 6.8–8.8)

## 2021-02-25 NOTE — TELEPHONE ENCOUNTER
Prior Authorization Retail Medication Request    Medication/Dose: BD NEEDLE  23GX1  ICD code Gender dysphoria in adult [F64.0]    Insurance Name:  [employer]  Insurance ID:  [id number]

## 2021-02-26 LAB — TESTOST SERPL-MCNC: 831 NG/DL (ref 240–950)

## 2021-02-27 NOTE — TELEPHONE ENCOUNTER
PA Initiation    Medication: BD NEEDLE  23GX1  Insurance Company: Other (see comments)  Pharmacy Filling the Rx: [id number] - [facility], [place] - [address] [facility].  Filling Pharmacy Phone: [phone]  Filling Pharmacy Fax: [phone]  Start Date: 2/27/2021

## 2021-03-01 NOTE — TELEPHONE ENCOUNTER
Prior Authorization:     Denied Reason: see chart    Alternatives: None available    Pharmacy notified.  Routing to MD    Please advise if you would like to move forward with the appeal process or plan to  prescribe an alternative medication. If Appeal is desired a letter of medical necessity with denial rationale is needed to start the appeal process.   Route to PA Pool when completed.    Marge Putnam CMA on 3/1/2021 at 10:15 AM

## 2021-03-01 NOTE — TELEPHONE ENCOUNTER
Will attempt to send 25g needle.     If this is not approved, will further pursue prior authorization.     Keily Nino MD   (covering for Dr. Brewster)

## 2021-03-01 NOTE — TELEPHONE ENCOUNTER
PRIOR AUTHORIZATION DENIED    Medication: BD NEEDLE  23GX1    Denial Date: 2/28/2021    Denial Rational: These are available OTC and not covered under patient's plan.    Appeal Information: N/A

## 2021-04-23 DIAGNOSIS — F64.0 GENDER DYSPHORIA IN ADULT: ICD-10-CM

## 2021-04-23 NOTE — TELEPHONE ENCOUNTER
"Request for medication refill:  Needle, Disp, 23G X 1\" MISC  Syringe, Disposable, (SYRINGE LUER LOCK) 3 ML MISC  Providers if patient needs an appointment and you are willing to give a one month supply please refill for one month and  send a letter/MyChart using \".SMILLIMITEDREFILL\" .smillimited and route chart to \"P SMI \" (Giving one month refill in non controlled medications is strongly recommended before denial)    If refill has been denied, meaning absolutely no refills without visit, please complete the smart phrase \".smirxrefuse\" and route it to the \"P SMI MED REFILLS\"  pool to inform the patient and the pharmacy.    Yudi Denson        "

## 2021-04-25 ENCOUNTER — HEALTH MAINTENANCE LETTER (OUTPATIENT)
Age: 26
End: 2021-04-25

## 2021-04-27 DIAGNOSIS — F64.0 GENDER DYSPHORIA IN ADULT: ICD-10-CM

## 2021-04-27 NOTE — TELEPHONE ENCOUNTER
"Request for medication refill:  needle, disp, 18G X 1\" MISC  Providers if patient needs an appointment and you are willing to give a one month supply please refill for one month and  send a letter/MyChart using \".SMILLIMITEDREFILL\" .smillimited and route chart to \"P [facility] \" (Giving one month refill in non controlled medications is strongly recommended before denial)    If refill has been denied, meaning absolutely no refills without visit, please complete the smart phrase \".smirxrefuse\" and route it to the \"P [facility] MED REFILLS\"  pool to inform the patient and the pharmacy.    Yudi Denson        "

## 2021-06-02 ENCOUNTER — DOCUMENTATION ONLY (OUTPATIENT)
Dept: FAMILY MEDICINE | Facility: CLINIC | Age: 26
End: 2021-06-02

## 2021-06-02 NOTE — PROGRESS NOTES
"When opening a documentation only encounter, be sure to enter in \"Chief Complaint\" Forms and in \" Comments\" Title of form, description if needed.    Luis is a 25 year old  adult  Form received via: Fax  Form now resides in: Provider Ready    Irma Garcia MA     Form has been completed by provider.     Form sent out via: Fax to [facility] at Fax Number: [phone]  Patient informed: No, Reason:n/a  Output date: Shadia 3, 2021    Irma Garcia MA      **Please close the encounter**                      "

## 2021-06-08 ENCOUNTER — VIRTUAL VISIT (OUTPATIENT)
Dept: FAMILY MEDICINE | Facility: CLINIC | Age: 26
End: 2021-06-08
Payer: COMMERCIAL

## 2021-06-08 DIAGNOSIS — L70.0 ACNE VULGARIS: Primary | ICD-10-CM

## 2021-06-08 DIAGNOSIS — F32.1 CURRENT MODERATE EPISODE OF MAJOR DEPRESSIVE DISORDER, UNSPECIFIED WHETHER RECURRENT (H): ICD-10-CM

## 2021-06-08 DIAGNOSIS — F64.0 GENDER DYSPHORIA IN ADULT: ICD-10-CM

## 2021-06-08 PROCEDURE — 99214 OFFICE O/P EST MOD 30 MIN: CPT | Mod: GT | Performed by: STUDENT IN AN ORGANIZED HEALTH CARE EDUCATION/TRAINING PROGRAM

## 2021-06-08 RX ORDER — FLUOXETINE 20 MG/1
80 TABLET, FILM COATED ORAL DAILY
Qty: 360 TABLET | Refills: 1 | Status: SHIPPED | OUTPATIENT
Start: 2021-06-08 | End: 2021-11-04

## 2021-06-08 RX ORDER — TESTOSTERONE CYPIONATE 200 MG/ML
60 INJECTION, SOLUTION INTRAMUSCULAR WEEKLY
Qty: 4 ML | Refills: 3 | Status: SHIPPED | OUTPATIENT
Start: 2021-06-08 | End: 2022-03-29

## 2021-06-08 RX ORDER — CLINDAMYCIN PHOSPHATE AND BENZOYL PEROXIDE 10; 50 MG/G; MG/G
GEL TOPICAL
Qty: 45 G | Refills: 1 | Status: SHIPPED | OUTPATIENT
Start: 2021-06-08 | End: 2021-12-10

## 2021-06-08 RX ORDER — TRETINOIN 0.25 MG/G
GEL TOPICAL AT BEDTIME
Qty: 45 G | Refills: 1 | Status: SHIPPED | OUTPATIENT
Start: 2021-06-08 | End: 2021-12-10

## 2021-06-08 NOTE — PROGRESS NOTES
Preceptor Attestation:   I discussed the patient with the resident. Patient seen and evaluated via video visit. I have verified the content of the note, which accurately reflects my assessment of the patient and the plan of care.   Supervising Physician:  Jessica Lopez MD.

## 2021-06-08 NOTE — PATIENT INSTRUCTIONS
Patient Education   Here is the plan from today's visit    1. Gender dysphoria in adult  - testosterone cypionate (DEPOTESTOSTERONE) 200 MG/ML injection; Inject 0.3 mLs (60 mg) into the muscle once a week  Dispense: 4 mL; Refill: 3    2. Current moderate episode of major depressive disorder, unspecified whether recurrent (H)  - FLUoxetine 20 MG tablet; Take 4 tablets (80 mg) by mouth daily  Dispense: 360 tablet; Refill: 1    3. Acne vulgaris  Keep daily face wash.   Add these two things below - retin-a at night  clinda-benzoyl peroxide in AM  - tretinoin (RETIN-A) 0.025 % external gel; Apply topically At Bedtime  Dispense: 45 g; Refill: 1  - clindamycin phos-benzoyl perox (DUAC) 1.2-5 % external gel; Apply in morning once daily.  Dispense: 45 g; Refill: 1  - DERMATOLOGY REFERRAL - INTERNAL    Please call or return to clinic if your symptoms don't go away.    Follow up plan  Return in about 5 months (around 11/8/2021) for in person visit.    Thank you for coming to [facility] today.  COVID-19 Vaccine:  If you are eligible for the COVID-19 vaccine, you can schedule via [employer] or call [facility] at [phone]. If you need assistance with scheduling, please speak to a Care Coordinator or your provider.   Lab Testing:  **If you had lab testing today and your results are reassuring or normal they will be mailed to you or sent through [employer] within 7 days.   **If the lab tests need quick action we will call you with the results.  **If you are having labs done on a different day, please call [phone] to schedule at [facility] or [phone] for other [facility] locations.   The phone number we will call with results is # [phone] (home) . If this is not the best number please call our clinic and change the number.  Medication Refills:  If you need any refills please call your pharmacy and they will contact us.   If you need to  your refill at a new pharmacy, please contact  the new pharmacy directly. The new pharmacy will help you get your medications transferred faster.   Scheduling:  If you have any concerns about today's visit or wish to schedule another appointment please call our office during normal business hours [phone] (8-5:00 M-F)  If a referral was made to a [facility] and you don't get a call from central scheduling please call [phone].  If a Mammogram was ordered for you at [facility] call [phone] to schedule or change your appointment.  If you had an EKG/XRay/CT/Ultrasound/MRI ordered the number is [phone] to schedule or change your radiology appointment.   Medical Concerns:  If you have urgent medical concerns please call [phone] at any time of the day.    Adrian Brewster MD       Dermatology referral  [phone]  Hung Coleman Christie R Hi Christie,     Thank you for the referral. I confirmed with Luis his appointment for Thursday, 9/23 with Dr. Kevon Rebolledo at our [place] location.     Thanks again,   Hung    Previous Messages    ----- Message -----   From: Ana Rosa Patel   Sent: 7/8/2021   2:36 PM CDT   To: Hung Coleman       ----- Message -----   From: Flor Rowe   Sent: 7/8/2021  11:57 AM CDT   To: Ana Rosa Patel       ----- Message -----   From: Yashira Logan   Sent: 7/8/2021  10:49 AM CDT   To: Adult Referral Specialist Team     Good Morning,     Can you please take a look at the Dermatology referral written on 6/8, assist patient in scheduling and then let me know status for my provider? Thank you so much!     Yashira Anderson

## 2021-06-08 NOTE — PROGRESS NOTES
"Luis is a 25 year old who is being evaluated via a billable video visit.      How would you like to obtain your AVS? MyChart  If the video visit is dropped, the invitation should be resent by: phone  Will anyone else be joining your video visit? No    Video Start Time: 10:36 AM    Assessment & Plan     Gender dysphoria in adult  Stable, due for yearly labs 01/2022.   - testosterone cypionate (DEPOTESTOSTERONE) 200 MG/ML injection; Inject 0.3 mLs (60 mg) into the muscle once a week    Current moderate episode of major depressive disorder, unspecified whether recurrent (H)  Chronic, stable.   - FLUoxetine 20 MG tablet; Take 4 tablets (80 mg) by mouth daily    Acne vulgaris  Uncontrolled inflammatory acne on back, mild on chest and face. Requests dermatology referral if below treatment is insufficient.   Daily face wash OTC  clinda-benzoyl gel in AM  Retin-A gel in PM  - tretinoin (RETIN-A) 0.025 % external gel; Apply topically At Bedtime  - clindamycin phos-benzoyl perox (DUAC) 1.2-5 % external gel; Apply in morning once daily.  - DERMATOLOGY REFERRAL - INTERNAL    Prescription drug management    Return in about 5 months (around 11/8/2021) for in person visit.    Adrian Brewster MD  [facility]    Dmitriy Smith is a 25 year old who presents for the following health issues    HPI     HRT follow up  Doing well  Refill testosterone    Occasional migraines - 1 or 2 a month max  triptan works well for those times  controlled    Acne worsening   Face and back/chest  OTC neutrogena   Changing pillow case and sheets more now which hasn't done much    Fluoxetine prescribed by a psychiatrist in the past   80 mg a day - has been on that for 8 years  Increased to 80 mg last year  Depression stable  Mood \"generally good\"  No SI or HI  Feels safe    Review of Systems   See HPI       Objective       Vitals:  No vitals were obtained today due to virtual visit.    Physical Exam   GENERAL: Healthy, " alert and no distress  EYES: Eyes grossly normal to inspection.  No discharge or erythema, or obvious scleral/conjunctival abnormalities.  RESP: No audible wheeze, cough, or visible cyanosis.  No visible retractions or increased work of breathing.    SKIN: Inflammatory acne on back and mild on face, chest   NEURO: Cranial nerves grossly intact.  Mentation and speech appropriate for age.  PSYCH: Mentation appears normal, affect normal/bright, judgement and insight intact, normal speech and appearance well-groomed      Orders Only on 02/24/2021   Component Date Value Ref Range Status     Testosterone Total 02/24/2021 831  240 - 950 ng/dL Final    Comment: This test was developed and its performance characteristics determined by the   [facility].   It has not been cleared or approved by the FDA. The laboratory is regulated   under CLIA as qualified to perform high-complexity testing. This test is used   for clinical purposes. It should not be regarded as investigational or for   research.       Bilirubin Direct 02/24/2021 <0.1  0.0 - 0.2 mg/dL Final     Bilirubin Total 02/24/2021 0.3  0.2 - 1.3 mg/dL Final     Albumin 02/24/2021 4.1  3.4 - 5.0 g/dL Final     Protein Total 02/24/2021 7.5  6.8 - 8.8 g/dL Final     Alkaline Phosphatase 02/24/2021 97  40 - 150 U/L Final     ALT 02/24/2021 53  0 - 70 U/L Final     AST 02/24/2021 23  0 - 45 U/L Final     ----- Service Performed and Documented by Resident or Fellow ------      Video-Visit Details    Type of service:  Video Visit    Video End Time:10:50 AM    Originating Location (pt. Location): Home    Distant Location (provider location):  [facility] [email or web address]     Platform used for Video Visit: [employer]

## 2021-07-07 ENCOUNTER — OFFICE VISIT (OUTPATIENT)
Dept: FAMILY MEDICINE | Facility: CLINIC | Age: 26
End: 2021-07-07
Payer: COMMERCIAL

## 2021-07-07 ENCOUNTER — TELEPHONE (OUTPATIENT)
Dept: FAMILY MEDICINE | Facility: CLINIC | Age: 26
End: 2021-07-07

## 2021-07-07 VITALS
SYSTOLIC BLOOD PRESSURE: 129 MMHG | DIASTOLIC BLOOD PRESSURE: 83 MMHG | WEIGHT: 197 LBS | HEART RATE: 75 BPM | BODY MASS INDEX: 30.92 KG/M2 | TEMPERATURE: 97.4 F | RESPIRATION RATE: 18 BRPM | OXYGEN SATURATION: 97 % | HEIGHT: 67 IN

## 2021-07-07 DIAGNOSIS — J30.89 NON-SEASONAL ALLERGIC RHINITIS, UNSPECIFIED TRIGGER: ICD-10-CM

## 2021-07-07 DIAGNOSIS — R09.81 NASAL CONGESTION: Primary | ICD-10-CM

## 2021-07-07 DIAGNOSIS — R06.83 LOUD SNORING: ICD-10-CM

## 2021-07-07 PROCEDURE — 99213 OFFICE O/P EST LOW 20 MIN: CPT | Performed by: PHYSICIAN ASSISTANT

## 2021-07-07 RX ORDER — FLUTICASONE PROPIONATE 50 MCG
2 SPRAY, SUSPENSION (ML) NASAL DAILY
Qty: 16 G | Refills: 3 | Status: SHIPPED | OUTPATIENT
Start: 2021-07-07 | End: 2021-12-10

## 2021-07-07 ASSESSMENT — PAIN SCALES - GENERAL: PAINLEVEL: NO PAIN (1)

## 2021-07-07 ASSESSMENT — MIFFLIN-ST. JEOR: SCORE: 1842.22

## 2021-07-07 NOTE — PROGRESS NOTES
"    Assessment & Plan   Problem List Items Addressed This Visit     None      Visit Diagnoses     Nasal congestion    -  Primary    Relevant Medications    fluticasone (FLONASE) 50 MCG/ACT nasal spray    Other Relevant Orders    OTOLARYNGOLOGY REFERRAL    Loud snoring        Relevant Orders    SLEEP EVALUATION & MANAGEMENT REFERRAL - ADULT -[facility]  [phone] (Age 15 and up)    Non-seasonal allergic rhinitis, unspecified trigger        Relevant Medications    fluticasone (FLONASE) 50 MCG/ACT nasal spray       start Flonase 2 sprays into each nostril daily   Continue Claritin   Make appointment with sleep center and ENT if Flonase does not improve congestion in 3-4 weeks     20 minutes spent on the date of the encounter doing chart review, history and exam, documentation and further activities per the note       BMI:   Estimated body mass index is 30.85 kg/m  as calculated from the following:    Height as of this encounter: 1.702 m (5' 7\").    Weight as of this encounter: 89.4 kg (197 lb).   Weight management plan: Discussed healthy diet and exercise guidelines        Return in about 3 weeks (around 7/28/2021) for ENT ans sleep center .    RUSSEL Osuna Welia HealthLEONIDES Smith is a 25 year old who presents for the following health issues   HPI     Concern - snoring, restless sleep  Onset: 2 months,   Description: snores a lot more on the last 2 moths, wakes up tired   Intensity: moderate, severe  Progression of Symptoms:  worsening  Accompanying Signs & Symptoms: mouth breather and chronic nasal congestion \"a long as can remember himself\". Patient denies postnasal drainage or recent URI. No sinus pressure. Patient denies worsened depression that may have caused sleep issues, reports stable mental mood.   Previous history of similar problem: nasal congestions due to allergy  Precipitating factors:        Worsened by: n/a  Alleviating " "factors:        Improved by: none  Therapies tried and outcome: Claritin for nasal congestion      Review of Systems   Constitutional, HEENT, cardiovascular, pulmonary, gi and gu systems are negative, except as otherwise noted.      Objective    /83 (BP Location: Left arm, Patient Position: Sitting, Cuff Size: Adult Regular)   Pulse 75   Temp 97.4  F (36.3  C) (Tympanic)   Resp 18   Ht 1.702 m (5' 7\")   Wt 89.4 kg (197 lb)   SpO2 97%   BMI 30.85 kg/m    Body mass index is 30.85 kg/m .     Physical Exam   GENERAL: healthy, alert and no distress  EYES: Eyes grossly normal to inspection, PERRL and conjunctivae and sclerae normal  HENT: normal cephalic/atraumatic, ear canals and TM's normal, oropharynx clear, oral mucous membranes moist and slightly deviated nasal septum tot he right, mild erythema of the nasal mucosa  NECK: no adenopathy, no asymmetry, masses, or scars and thyroid normal to palpation  RESP: lungs clear to auscultation - no rales, rhonchi or wheezes  CV: regular rate and rhythm, normal S1 S2, no S3 or S4, no murmur, click or rub, no peripheral edema and peripheral pulses strong  ABDOMEN: soft, nontender, no hepatosplenomegaly, no masses and bowel sounds normal  MS: no gross musculoskeletal defects noted, no edema                "

## 2021-07-07 NOTE — TELEPHONE ENCOUNTER
Alternative requested. fluticasone (FLONASE) 50 MCG/ACT nasal spray not covered.              Derrell Faarax  [facility]   I personally performed the service described in the documentation recorded by the scribe in my presence, and it accurately and completely records my words and actions.

## 2021-07-07 NOTE — PATIENT INSTRUCTIONS
At [facility], we strive to deliver an exceptional experience to you, every time we see you. If you receive a survey, please complete it as we do value your feedback.  If you have MyChart, you can expect to receive results automatically within 24 hours of their completion.  Your provider will send a note interpreting your results as well.   If you do not have MyChart, you should receive your results in about a week by mail.    Your care team:                            Family Medicine Internal Medicine   MD Zander Shepherd MD Shantel Branch-Fleming, MD Srinivasa Vaka, MD Katya Belousova, PAТАТЬЯНА Lopez, APRN CNP    Kris Hanna, MD Pediatrics   lEoy Cross, PAТАТЬЯНА Kurtz, CNP MD Oumou Villareal APRN CNP   MD Suha Laguerre MD Deborah Mielke, MD Roxanne Dolan, APRN [facility]      Clinic hours: Monday - Thursday 7 am-6 pm; Fridays 7 am-5 pm.   Urgent care: Monday - Friday 10 am- 8 pm; Saturday and Sunday 9 am-5 pm.    Clinic: [phone]       [facility]: Monday - Thursday 8 am - 7 pm; Friday 8 am - 6 pm  [facility]: [phone]     Use [email or web address] for 24/7 diagnosis and treatment of dozens of conditions.

## 2021-07-07 NOTE — TELEPHONE ENCOUNTER
Has to purchase over the counter. This medication is available over the counter without the prescription.     Vanessa Chandler PA-C

## 2021-07-13 NOTE — PROGRESS NOTES
History of Present Illness - Luis Epstein is a very pleasant 25 year old adult here to see me for the first time for nasal obstruction.    He tells me that for about three months he started to get nasal congestion and tried Claritin, Flonase.  His partner has told him that he has had a major increase in snoring and poor sleep.  His migraines have gotten worse as well. He has been told before that he is a mouth breather, but it is just that things are much worse now. He denies any change in work or home environment.    A second issue he wanted to discuss was his tonsils.  He has always had large tonsils, even as a child.  But over the past two years he has started to get sore throats as well.  It averages 3-4 per year, and he will get very painful tonsillitis, to the point where he has a hard time swallowing food.     Otherwise he has no history of ENT surgery or head and neck disease.    Past Medical History -   Patient Active Problem List   Diagnosis     Gender dysphoria in adult     Acne, unspecified acne type     Family history of hyperlipidemia     IUD (intrauterine device) in place     Current moderate episode of major depressive disorder, unspecified whether recurrent (H)     Chronic migraine without aura without status migrainosus, not intractable       Current Medications -   Current Outpatient Medications:      clindamycin phos-benzoyl perox (DUAC) 1.2-5 % external gel, Apply in morning once daily., Disp: 45 g, Rfl: 1     eletriptan (RELPAX) 20 MG tablet, Take 1 tablet (20 mg) by mouth at onset of headache for migraine May repeat in 2 hours. Max 4 tablets/24 hours., Disp: 40 tablet, Rfl: 1     FLUoxetine 20 MG tablet, Take 4 tablets (80 mg) by mouth daily, Disp: 360 tablet, Rfl: 1     fluticasone (FLONASE) 50 MCG/ACT nasal spray, Spray 2 sprays into both nostrils daily, Disp: 16 g, Rfl: 3     levonorgestrel (MIRENA) 20 MCG/24HR IUD, 1 each by Intrauterine route once, Disp: , Rfl:      needle, disp, 18G X  "1\" MISC, 1 applicator every 7 days, Disp: 100 each, Rfl: 0     Needle, Disp, 23G X 1\" MISC, 1 applicator every 7 days, Disp: 100 each, Rfl: 0     Needle, Disp, 25G X 5/8\" MISC, To use to inject hormones weekly, Disp: 25 each, Rfl: 11     Syringe, Disposable, (SYRINGE LUER LOCK) 3 ML MISC, 1 applicator every 7 days, Disp: 100 each, Rfl: 0     testosterone cypionate (DEPOTESTOSTERONE) 200 MG/ML injection, Inject 0.3 mLs (60 mg) into the muscle once a week, Disp: 4 mL, Rfl: 3     tretinoin (RETIN-A) 0.025 % external gel, Apply topically At Bedtime, Disp: 45 g, Rfl: 1    Allergies - No Known Allergies    Social History -   Social History     Socioeconomic History     Marital status: Single     Spouse name: Not on file     Number of children: Not on file     Years of education: Not on file     Highest education level: Not on file   Occupational History     Occupation: student     Comment: getting masters in education     Occupation: teacher     Comment: [employer]   Tobacco Use     Smoking status: Never Smoker     Smokeless tobacco: Never Used   Substance and Sexual Activity     Alcohol use: Yes     Drug use: Never     Sexual activity: Yes     Partners: Male     Birth control/protection: I.U.D.   Other Topics Concern     Not on file   Social History Narrative     Not on file     Social Determinants of Health     Financial Resource Strain:      Difficulty of Paying Living Expenses:    Food Insecurity:      Worried About Running Out of Food in the Last Year:      Ran Out of Food in the Last Year:    Transportation Needs:      Lack of Transportation (Medical):      Lack of Transportation (Non-Medical):    Physical Activity:      Days of Exercise per Week:      Minutes of Exercise per Session:    Stress:      Feeling of Stress :    Social Connections:      Frequency of Communication with Friends and Family:      Frequency of Social Gatherings with Friends and Family:      Attends Zoroastrianism Services:      Active Member of " Clubs or Organizations:      Attends Club or Organization Meetings:      Marital Status:    Intimate Partner Violence:      Fear of Current or Ex-Partner:      Emotionally Abused:      Physically Abused:      Sexually Abused:        Family History -   Family History   Problem Relation Age of Onset     Hyperlipidemia Mother      Hyperlipidemia Father        Review of Systems - As per HPI and PMHx, otherwise 10+ system review of the head and neck, and general constitution is negative.    Physical Exam  /80 (BP Location: Right arm, Patient Position: Sitting, Cuff Size: Adult Regular)   Pulse 73   Wt 89.4 kg (197 lb)   SpO2 96%   BMI 30.85 kg/m      General - The patient is well nourished and well developed, and appears to have good nutritional status.  Alert and oriented to person and place, answers questions and cooperates with examination appropriately.   Head and Face - Normocephalic and atraumatic, with no gross asymmetry noted of the contour of the facial features.  The facial nerve is intact, with strong symmetric movements.  Voice and Breathing - The patient was breathing comfortably without the use of accessory muscles. There was no wheezing, stridor, or stertor.  The patients voice was clear and strong, and had appropriate pitch and quality.  Ears - The tympanic membranes are normal in appearance, bony landmarks are intact.  No retraction, perforation, or masses.  No fluid or purulence was seen in the external canal or the middle ear. No evidence of infection of the middle ear or external canal, cerumen was normal in appearance.  Eyes - Extraocular movements intact, and the pupils were reactive to light.  Sclera were not icteric or injected, conjunctiva were pink and moist.  Mouth - Examination of the oral cavity showed pink, healthy oral mucosa. No lesions or ulcerations noted.  The tongue was mobile and midline, and the dentition were in good condition.    Throat - The walls of the oropharynx were  smooth, pink, moist, symmetric, and had no lesions or ulcerations.  The tonsillar pillars and soft palate were symmetric.  The uvula was midline on elevation.  His tonsils were very large, 3+ and pitted with exudates.  Neck - Normal midline excursion of the laryngotracheal complex during swallowing.  Full range of motion on passive movement.  Palpation of the occipital, submental, submandibular, internal jugular chain, and supraclavicular nodes did not demonstrate any abnormal lymph nodes or masses.  The carotid pulse was palpable bilaterally.  Palpation of the thyroid was soft and smooth, with no nodules or goiter appreciated.  The trachea was mobile and midline.  Nose - External contour is symmetric, no gross deflection or scars.  Nasal mucosa is globally boggy and edematous.  No polyps or pus noted, and the septum is deflected far to the RIGHT and obstructive.      A/P - Luis Epstein is a 25 year old adult  (J34.89) Nasal obstruction  (primary encounter diagnosis)  (J34.2) Deviated nasal septum  (J35.1) Tonsillar hypertrophy  (G47.30) Sleep-disordered breathing  (J35.01) Chronic tonsillitis    I am going to order a sinus CT scan, to differentiate if this is infection vs allergic rhinitis.  I will call with results and a treatment plan.  I have preempitvely given him a NeilMed sinus rinse bottle in case we use that in our treatment plan.    I have also discussed that if medical therapies do not work, a septoplasty might be in order.  But of course, we will try to fix this without a procedure.    For the tonsils, given the exam findings and history consistent with chronic tonsillitis, removal is an option.  He will think about this.

## 2021-07-15 ENCOUNTER — OFFICE VISIT (OUTPATIENT)
Dept: OTOLARYNGOLOGY | Facility: CLINIC | Age: 26
End: 2021-07-15
Payer: COMMERCIAL

## 2021-07-15 VITALS
BODY MASS INDEX: 30.85 KG/M2 | SYSTOLIC BLOOD PRESSURE: 120 MMHG | HEART RATE: 73 BPM | OXYGEN SATURATION: 96 % | WEIGHT: 197 LBS | DIASTOLIC BLOOD PRESSURE: 80 MMHG

## 2021-07-15 DIAGNOSIS — J34.2 DEVIATED NASAL SEPTUM: ICD-10-CM

## 2021-07-15 DIAGNOSIS — G47.30 SLEEP-DISORDERED BREATHING: ICD-10-CM

## 2021-07-15 DIAGNOSIS — J35.01 CHRONIC TONSILLITIS: ICD-10-CM

## 2021-07-15 DIAGNOSIS — J34.89 NASAL OBSTRUCTION: Primary | ICD-10-CM

## 2021-07-15 DIAGNOSIS — J35.1 TONSILLAR HYPERTROPHY: ICD-10-CM

## 2021-07-15 PROCEDURE — 99204 OFFICE O/P NEW MOD 45 MIN: CPT | Performed by: OTOLARYNGOLOGY

## 2021-07-15 NOTE — LETTER
"    7/15/2021         RE: Luis Epstein  [address] [facility] [place] [address]        Dear Colleague,    Thank you for referring your patient, Luis Epstein, to the [facility]. Please see a copy of my visit note below.    History of Present Illness - Luis Epstein is a very pleasant 25 year old adult here to see me for the first time for nasal obstruction.    He tells me that for about three months he started to get nasal congestion and tried Claritin, Flonase.  His partner has told him that he has had a major increase in snoring and poor sleep.  His migraines have gotten worse as well.    He has been told before that he is a mouth breather, but it is just that things are much worse now.    He denies any change in work or home environment.    Otherwise he has no history of ENT surgery or head and neck disease.    Past Medical History -   Patient Active Problem List   Diagnosis     Gender dysphoria in adult     Acne, unspecified acne type     Family history of hyperlipidemia     IUD (intrauterine device) in place     Current moderate episode of major depressive disorder, unspecified whether recurrent (H)     Chronic migraine without aura without status migrainosus, not intractable       Current Medications -   Current Outpatient Medications:      clindamycin phos-benzoyl perox (DUAC) 1.2-5 % external gel, Apply in morning once daily., Disp: 45 g, Rfl: 1     eletriptan (RELPAX) 20 MG tablet, Take 1 tablet (20 mg) by mouth at onset of headache for migraine May repeat in 2 hours. Max 4 tablets/24 hours., Disp: 40 tablet, Rfl: 1     FLUoxetine 20 MG tablet, Take 4 tablets (80 mg) by mouth daily, Disp: 360 tablet, Rfl: 1     fluticasone (FLONASE) 50 MCG/ACT nasal spray, Spray 2 sprays into both nostrils daily, Disp: 16 g, Rfl: 3     levonorgestrel (MIRENA) 20 MCG/24HR IUD, 1 each by Intrauterine route once, Disp: , Rfl:      needle, disp, 18G X 1\" MISC, 1 applicator every 7 days, Disp: 100 " "each, Rfl: 0     Needle, Disp, 23G X 1\" MISC, 1 applicator every 7 days, Disp: 100 each, Rfl: 0     Needle, Disp, 25G X 5/8\" MISC, To use to inject hormones weekly, Disp: 25 each, Rfl: 11     Syringe, Disposable, (SYRINGE LUER LOCK) 3 ML MISC, 1 applicator every 7 days, Disp: 100 each, Rfl: 0     testosterone cypionate (DEPOTESTOSTERONE) 200 MG/ML injection, Inject 0.3 mLs (60 mg) into the muscle once a week, Disp: 4 mL, Rfl: 3     tretinoin (RETIN-A) 0.025 % external gel, Apply topically At Bedtime, Disp: 45 g, Rfl: 1    Allergies - No Known Allergies    Social History -   Social History     Socioeconomic History     Marital status: Single     Spouse name: Not on file     Number of children: Not on file     Years of education: Not on file     Highest education level: Not on file   Occupational History     Occupation: student     Comment: getting masters in education     Occupation: teacher     Comment: [employer]   Tobacco Use     Smoking status: Never Smoker     Smokeless tobacco: Never Used   Substance and Sexual Activity     Alcohol use: Yes     Drug use: Never     Sexual activity: Yes     Partners: Male     Birth control/protection: I.U.D.   Other Topics Concern     Not on file   Social History Narrative     Not on file     Social Determinants of Health     Financial Resource Strain:      Difficulty of Paying Living Expenses:    Food Insecurity:      Worried About Running Out of Food in the Last Year:      Ran Out of Food in the Last Year:    Transportation Needs:      Lack of Transportation (Medical):      Lack of Transportation (Non-Medical):    Physical Activity:      Days of Exercise per Week:      Minutes of Exercise per Session:    Stress:      Feeling of Stress :    Social Connections:      Frequency of Communication with Friends and Family:      Frequency of Social Gatherings with Friends and Family:      Attends Church Services:      Active Member of Clubs or Organizations:      Attends Club or " Organization Meetings:      Marital Status:    Intimate Partner Violence:      Fear of Current or Ex-Partner:      Emotionally Abused:      Physically Abused:      Sexually Abused:        Family History -   Family History   Problem Relation Age of Onset     Hyperlipidemia Mother      Hyperlipidemia Father        Review of Systems - As per HPI and PMHx, otherwise 10+ system review of the head and neck, and general constitution is negative.    Physical Exam  /80 (BP Location: Right arm, Patient Position: Sitting, Cuff Size: Adult Regular)   Pulse 73   Wt 89.4 kg (197 lb)   SpO2 96%   BMI 30.85 kg/m      General - The patient is well nourished and well developed, and appears to have good nutritional status.  Alert and oriented to person and place, answers questions and cooperates with examination appropriately.   Head and Face - Normocephalic and atraumatic, with no gross asymmetry noted of the contour of the facial features.  The facial nerve is intact, with strong symmetric movements.  Voice and Breathing - The patient was breathing comfortably without the use of accessory muscles. There was no wheezing, stridor, or stertor.  The patients voice was clear and strong, and had appropriate pitch and quality.  Ears - The tympanic membranes are normal in appearance, bony landmarks are intact.  No retraction, perforation, or masses.  No fluid or purulence was seen in the external canal or the middle ear. No evidence of infection of the middle ear or external canal, cerumen was normal in appearance.  Eyes - Extraocular movements intact, and the pupils were reactive to light.  Sclera were not icteric or injected, conjunctiva were pink and moist.  Mouth - Examination of the oral cavity showed pink, healthy oral mucosa. No lesions or ulcerations noted.  The tongue was mobile and midline, and the dentition were in good condition.    Throat - The walls of the oropharynx were smooth, pink, moist, symmetric, and had no  lesions or ulcerations.  The tonsillar pillars and soft palate were symmetric.  The uvula was midline on elevation.  His tonsils were surprisingly large, 3+ and pitted with exudates.  Neck - Normal midline excursion of the laryngotracheal complex during swallowing.  Full range of motion on passive movement.  Palpation of the occipital, submental, submandibular, internal jugular chain, and supraclavicular nodes did not demonstrate any abnormal lymph nodes or masses.  The carotid pulse was palpable bilaterally.  Palpation of the thyroid was soft and smooth, with no nodules or goiter appreciated.  The trachea was mobile and midline.  Nose - External contour is symmetric, no gross deflection or scars.  Nasal mucosa is globally boggy and edematous.  No polyps or pus noted, and the septum is deflected far to the RIGHT and obstructive.      A/P - Luis Epstein is a 25 year old adult  (J34.89) Nasal obstruction  (primary encounter diagnosis)  (J34.2) Deviated nasal septum  (J35.1) Tonsillar hypertrophy  (G47.30) Sleep-disordered breathing    I am going to order a sinus CT scan, to differentiate if this is infection vs allergic rhinitis.  I will call with results and a treatment plan.  I have preempitvely given him a NeilMed sinus rinse bottle in case we use that in our treatment plan.    I have also discussed that if medical therapies do not work, a septoplasty might be in order.  But of course, we will try to fix this without a procedure.    As an aside, we also discussed the finding of his large tonsils.  If they become more of an issue in the future, we can discuss tonsillectomy.        Again, thank you for allowing me to participate in the care of your patient.        Sincerely,        Sae Knight MD

## 2021-07-15 NOTE — NURSING NOTE
"Chief Complaint   Patient presents with     Nasal Congestion     head pressure and congestion. 3 months       Vitals:    07/15/21 1413   BP: 120/80   BP Location: Right arm   Patient Position: Sitting   Cuff Size: Adult Regular   Pulse: 73   SpO2: 96%   Weight: 89.4 kg (197 lb)     Wt Readings from Last 1 Encounters:   07/15/21 89.4 kg (197 lb)     Ht Readings from Last 1 Encounters:   07/07/21 1.702 m (5' 7\")       Sonia Nunez [facility], 7/15/2021 2:17 PM    "

## 2021-07-16 ENCOUNTER — MYC MEDICAL ADVICE (OUTPATIENT)
Dept: OTOLARYNGOLOGY | Facility: CLINIC | Age: 26
End: 2021-07-16

## 2021-07-16 ENCOUNTER — HOSPITAL ENCOUNTER (OUTPATIENT)
Dept: CT IMAGING | Facility: CLINIC | Age: 26
Discharge: HOME | End: 2021-07-16
Attending: OTOLARYNGOLOGY | Admitting: OTOLARYNGOLOGY
Payer: COMMERCIAL

## 2021-07-16 DIAGNOSIS — J35.01 CHRONIC TONSILLITIS: ICD-10-CM

## 2021-07-16 DIAGNOSIS — J32.4 CHRONIC PANSINUSITIS: Primary | ICD-10-CM

## 2021-07-16 DIAGNOSIS — J34.89 NASAL OBSTRUCTION: ICD-10-CM

## 2021-07-16 DIAGNOSIS — J34.2 DEVIATED NASAL SEPTUM: ICD-10-CM

## 2021-07-16 PROCEDURE — 70486 CT MAXILLOFACIAL W/O DYE: CPT

## 2021-07-16 RX ORDER — CLARITHROMYCIN 500 MG
500 TABLET ORAL 2 TIMES DAILY
Qty: 20 TABLET | Refills: 1 | Status: SHIPPED | OUTPATIENT
Start: 2021-07-16 | End: 2021-07-26

## 2021-07-16 NOTE — PROGRESS NOTES
Called an dspoke with Luis.  CT scan clearly shows chronic infection, RIGHT more than LEFT, and the widely deviated septum that is partially collapsing the drainage pathways of the RIGHT side.    I have discussed another attempt at prolonged antibiotics, vs functional endoscopic sinus surgery with septoplasty.    He has opted for surgery, and I have placed orders.  In the meantime, I have sent in a prescription for Biaxin to try and keep the infection under some degree of control until we can perform definitive treatment with surgery.

## 2021-07-19 ENCOUNTER — TELEPHONE (OUTPATIENT)
Dept: OTOLARYNGOLOGY | Facility: CLINIC | Age: 26
End: 2021-07-19

## 2021-07-19 ENCOUNTER — HOSPITAL ENCOUNTER (OUTPATIENT)
Facility: AMBULATORY SURGERY CENTER | Age: 26
End: 2021-07-19
Attending: OTOLARYNGOLOGY | Admitting: OTOLARYNGOLOGY
Payer: COMMERCIAL

## 2021-07-19 DIAGNOSIS — Z11.59 ENCOUNTER FOR SCREENING FOR OTHER VIRAL DISEASES: ICD-10-CM

## 2021-07-19 DIAGNOSIS — J32.4 CHRONIC PANSINUSITIS: ICD-10-CM

## 2021-07-19 RX ORDER — DEXAMETHASONE SODIUM PHOSPHATE 10 MG/ML
10 INJECTION, SOLUTION INTRAMUSCULAR; INTRAVENOUS ONCE
Status: CANCELLED | OUTPATIENT
Start: 2021-07-19 | End: 2021-07-19

## 2021-07-19 RX ORDER — CEFAZOLIN SODIUM 2 G/100ML
2 INJECTION, SOLUTION INTRAVENOUS SEE ADMIN INSTRUCTIONS
Status: CANCELLED | OUTPATIENT
Start: 2021-07-19

## 2021-07-19 RX ORDER — CEFAZOLIN SODIUM 2 G/100ML
2 INJECTION, SOLUTION INTRAVENOUS
Status: CANCELLED | OUTPATIENT
Start: 2021-07-19

## 2021-07-19 NOTE — TELEPHONE ENCOUNTER
Type of surgery: image guided functional endoscopic sinus surgery,with nasal septoplasty and turbinate reduction bilateral tonsillectomy bilateral   CPT 61936, 76368, 88221, 34644    Nasal obstruction J34.89  Deviated nasal septum J34.2    Tonsillar hypertrophy J35.1    Sleep-disordered breathing G47.30    Chronic tonsillitis J35.01    Location of surgery:  [facility]  Date and time of surgery: 07/22/2021  Surgeon: Antonia  Pre-Op Appt Date: 0720/2021  Post-Op Appt Date: 07/30/2021   Packet sent out: Yes /my chart   Pre-cert/Authorization completed: Prior auth is required for code [id number]. Unable to complete online submission. Spoke with rep via phone to have her submit and she is unable to process the request either. They are having system-wide issues.   Date: 7-19-21    Bri Red  Prior Authorization Dept  [phone]

## 2021-07-19 NOTE — TELEPHONE ENCOUNTER
Surgery has been canceled for 07/22/2021 and rescheduled for 08/03/2021 at [facility] [place]  preop 07/30  Postop 08/09

## 2021-07-20 NOTE — TELEPHONE ENCOUNTER
Able to submit today for code 20098 which is the only code that indicated that prior auth was needed. Included CT images, report, and OV.    Pending auth# [id number]

## 2021-07-30 ENCOUNTER — LAB (OUTPATIENT)
Dept: URGENT CARE | Facility: URGENT CARE | Age: 26
End: 2021-07-30
Payer: COMMERCIAL

## 2021-07-30 ENCOUNTER — OFFICE VISIT (OUTPATIENT)
Dept: FAMILY MEDICINE | Facility: CLINIC | Age: 26
End: 2021-07-30
Payer: COMMERCIAL

## 2021-07-30 ENCOUNTER — TELEPHONE (OUTPATIENT)
Dept: OTOLARYNGOLOGY | Facility: CLINIC | Age: 26
End: 2021-07-30

## 2021-07-30 VITALS
OXYGEN SATURATION: 97 % | TEMPERATURE: 98.5 F | DIASTOLIC BLOOD PRESSURE: 78 MMHG | RESPIRATION RATE: 16 BRPM | WEIGHT: 192.6 LBS | SYSTOLIC BLOOD PRESSURE: 124 MMHG | HEIGHT: 68 IN | HEART RATE: 77 BPM | BODY MASS INDEX: 29.19 KG/M2

## 2021-07-30 DIAGNOSIS — J34.2 DEVIATED NASAL SEPTUM: ICD-10-CM

## 2021-07-30 DIAGNOSIS — J32.0 CHRONIC RIGHT MAXILLARY SINUSITIS: Primary | ICD-10-CM

## 2021-07-30 DIAGNOSIS — J35.1 TONSILLAR HYPERTROPHY: ICD-10-CM

## 2021-07-30 DIAGNOSIS — Z11.59 ENCOUNTER FOR SCREENING FOR OTHER VIRAL DISEASES: ICD-10-CM

## 2021-07-30 DIAGNOSIS — J35.01 CHRONIC TONSILLITIS: ICD-10-CM

## 2021-07-30 DIAGNOSIS — R09.81 NASAL CONGESTION: ICD-10-CM

## 2021-07-30 DIAGNOSIS — Z01.818 PREOP GENERAL PHYSICAL EXAM: Primary | ICD-10-CM

## 2021-07-30 LAB
ANION GAP SERPL CALCULATED.3IONS-SCNC: 1 MMOL/L (ref 3–14)
BUN SERPL-MCNC: 11 MG/DL (ref 7–30)
CALCIUM SERPL-MCNC: 8.9 MG/DL (ref 8.5–10.1)
CHLORIDE BLD-SCNC: 110 MMOL/L (ref 94–109)
CO2 SERPL-SCNC: 29 MMOL/L (ref 20–32)
CREAT SERPL-MCNC: 1.16 MG/DL (ref 0.52–1.25)
ERYTHROCYTE [DISTWIDTH] IN BLOOD BY AUTOMATED COUNT: 12.5 % (ref 10–15)
GFR SERPL CREATININE-BSD FRML MDRD: 88 ML/MIN/1.73M2
GLUCOSE BLD-MCNC: 92 MG/DL (ref 70–99)
HCT VFR BLD AUTO: 45.7 % (ref 35–53)
HGB BLD-MCNC: 15.8 G/DL (ref 11.7–17.7)
MCH RBC QN AUTO: 31.3 PG (ref 26.5–33)
MCHC RBC AUTO-ENTMCNC: 34.6 G/DL (ref 31.5–36.5)
MCV RBC AUTO: 91 FL (ref 78–100)
PLATELET # BLD AUTO: 343 10E3/UL (ref 150–450)
POTASSIUM BLD-SCNC: 3.8 MMOL/L (ref 3.4–5.3)
RBC # BLD AUTO: 5.05 10E6/UL (ref 3.8–5.9)
SARS-COV-2 RNA RESP QL NAA+PROBE: NEGATIVE
SODIUM SERPL-SCNC: 140 MMOL/L (ref 133–144)
WBC # BLD AUTO: 6.4 10E3/UL (ref 4–11)

## 2021-07-30 PROCEDURE — 99214 OFFICE O/P EST MOD 30 MIN: CPT | Performed by: NURSE PRACTITIONER

## 2021-07-30 PROCEDURE — 85027 COMPLETE CBC AUTOMATED: CPT | Performed by: NURSE PRACTITIONER

## 2021-07-30 PROCEDURE — U0005 INFEC AGEN DETEC AMPLI PROBE: HCPCS

## 2021-07-30 PROCEDURE — 80048 BASIC METABOLIC PNL TOTAL CA: CPT | Performed by: NURSE PRACTITIONER

## 2021-07-30 PROCEDURE — U0003 INFECTIOUS AGENT DETECTION BY NUCLEIC ACID (DNA OR RNA); SEVERE ACUTE RESPIRATORY SYNDROME CORONAVIRUS 2 (SARS-COV-2) (CORONAVIRUS DISEASE [COVID-19]), AMPLIFIED PROBE TECHNIQUE, MAKING USE OF HIGH THROUGHPUT TECHNOLOGIES AS DESCRIBED BY CMS-2020-01-R: HCPCS

## 2021-07-30 PROCEDURE — 36415 COLL VENOUS BLD VENIPUNCTURE: CPT | Performed by: NURSE PRACTITIONER

## 2021-07-30 ASSESSMENT — MIFFLIN-ST. JEOR: SCORE: 1833.64

## 2021-07-30 NOTE — RESULT ENCOUNTER NOTE
Julisa MENDEZ Somes,    Attached are your test results.  -Normal red blood cell (hgb) levels, normal white blood cell count and normal platelet levels.   Please contact us if you have any questions.    Nataliia Benavides, CNP

## 2021-07-30 NOTE — PATIENT INSTRUCTIONS
Before your surgery:  10 days before: stop all over the counter supplements  1 week before: stop aspirin if you are taking aspirin.   stop ibuprofen, naproxen or similar antiinflammatory medications. You may use Tylenol for pain or headache.     On the day of your surgery:  Do not take any medication the morning of your surgery.     After surgery:    You may resume all your medications after the surgery unless your surgeon instructs you otherwise      Preparing for Your Surgery  Getting started  A nurse will call you to review your health history and instructions. They will give you an arrival time based on your scheduled surgery time.  Please be ready to share the following:    Your doctor's clinic name and phone number    Your medical, surgical and anesthesia history    A list of allergies and sensitivities    A list of medicines, including herbal treatments and over-the-counter drugs    Whether the patient has a legal guardian (ask how to send us the papers in advance)  If you have a child who's having surgery, please ask for a copy of Preparing for Your Child's Surgery.    Preparing for surgery    Within 30 days of surgery: Have a pre-op exam (sometimes called an H&P, or History and Physical). This can be done at a clinic or pre-operative center.  ? If you're having a , you may not need this exam. Talk to your care team    At your pre-op exam, talk to your care team about all medicines you take. If you need to stop any medicines before surgery, ask when to start taking them again.  ? We do this for your safety. Many medicines can make you bleed too much during surgery. Some change how well surgery (anesthesia) drugs work.    Call your insurance company to let them know you're having surgery. (If you don't have insurance, call [phone].)    Call your clinic if there's any change in your health. This includes signs of a cold or flu (sore throat, runny nose, cough, rash, fever). It also includes a  scrape or scratch near the surgery site.    If you have questions on the day of surgery, call your hospital or surgery center.  Eating and drinking guidelines  For your safety: Unless your surgeon tells you otherwise, follow the guidelines below.    Eat and drink as usual until 8 hours before surgery. After that, no food or milk.    Drink clear liquids until 2 hours before surgery. These are liquids you can see through, like water, Gatorade and Propel Water. You may also have black coffee and tea (no cream or milk).    Nothing by mouth within 2 hours of surgery. This includes gum, candy and breath mints.    If you drink, stop drinking alcohol the night before surgery.    If your care team tells you to take medicine on the morning of surgery, it's okay to take it with a sip of water.  Preventing infection    Shower or bathe the night before and morning of your surgery. Follow the instructions your clinic gave you. (If no instructions, use regular soap.)    Don't shave or clip hair near your surgery site. We'll remove the hair if needed.    Don't smoke or vape the morning of surgery. You may chew nicotine gum up to 2 hours before surgery. A nicotine patch is okay.  ? Note: Some surgeries require you to completely quit smoking and nicotine. Check with your surgeon.    Your care team will make every effort to keep you safe from infection. We will:  ? Clean our hands often with soap and water (or an alcohol-based hand rub).  ? Clean the skin at your surgery site with a special soap that kills germs.  ? Give you a special gown to keep you warm. (Cold raises the risk of infection.)  ? Wear special hair covers, masks, gowns and gloves during surgery.  ? Give antibiotic medicine, if prescribed. Not all surgeries need antibiotics.  What to bring on the day of surgery    Photo ID and insurance card    Copy of your health care directive, if you have one    Glasses and hearing aides (bring cases)  ? You can't wear contacts  during surgery    Inhaler and eye drops, if you use them (tell us about these when you arrive)    CPAP machine or breathing device, if you use them    A few personal items, if spending the night    If you have . . .  ? A pacemaker or ICD (cardiac defibrillator): Bring the ID card.  ? An implanted stimulator: Bring the remote control.  ? A legal guardian: Bring a copy of the certified (court-stamped) guardianship papers.  Please remove any jewelry, including body piercings. Leave jewelry and other valuables at home.  If you're going home the day of surgery  Important: If you don't follow the rules below, we must cancel your surgery.     Arrange for someone to drive you home after surgery. You may not drive, take a taxi or take public transportation by yourself (unless you'll have local anesthesia only).    Arrange for a responsible adult to stay with you overnight. If you don't, we may keep you in the hospital overnight, and you may need to pay the costs yourself.  Questions?   If you have any questions for your care team, list them here: _________________________________________________________________________________________________________________________________________________________________________________________________________________________________________________________________________________________________________________________  For informational purposes only. Not to replace the advice of your health care provider. Copyright   2003, 2019 [facility]. All rights reserved. Clinically reviewed by Sejal Santoyo MD. [employer] 609364 - REV 4/20.

## 2021-07-30 NOTE — RESULT ENCOUNTER NOTE
Julisa MENDEZ Somes,    Attached are your test results.  -Kidney function is normal (Cr, GFR), Sodium is normal, Potassium is normal, Calcium is normal, Glucose is normal.    Please contact us if you have any questions.    Nataliia Benavides, CNP

## 2021-07-30 NOTE — PROGRESS NOTES
[address] [address]  Phone: [phone]  Primary Provider: Jodi Lemon  Pre-op Performing Provider: BLANCA TEMPLETON    PREOPERATIVE EVALUATION:  Today's date: 7/30/2021    Luis Epsteni is a 25 year old adult who presents for a preoperative evaluation.    Surgical Information:  Surgery/Procedure: Tonsillectomy  Surgery Location:  OR  Surgeon: Sae Knight MD  Surgery Date: 8/3/2021  Time of Surgery: 7:15 am  Where patient plans to recover: At home with family  Fax number for surgical facility: Note does not need to be faxed, will be available electronically in Epic.    Type of Anesthesia Anticipated: General        Subjective     HPI related to upcoming procedure: chronic sinus congestion and deviated septum having issues with continuous difficulty with breathing out of nose     Preop Questions 7/30/2021   1. Have you ever had a heart attack or stroke? No   2. Have you ever had surgery on your heart or blood vessels, such as a stent placement, a coronary artery bypass, or surgery on an artery in your head, neck, heart, or legs? No   3. Do you have chest pain with activity? No   4. Do you have a history of  heart failure? No   5. Do you currently have a cold, bronchitis or symptoms of other infection? YES - has hx of chronic sinusitis    6. Do you have a cough, shortness of breath, or wheezing? No   7. Do you or anyone in your family have previous history of blood clots? No   8. Do you or does anyone in your family have a serious bleeding problem such as prolonged bleeding following surgeries or cuts? No   9. Have you ever had problems with anemia or been told to take iron pills? No   10. Have you had any abnormal blood loss such as black, tarry or bloody stools, or abnormal vaginal bleeding? No   10. Have you had any abnormal blood loss such as black, tarry or bloody stools? No   11. Have you ever had a blood transfusion?  No   12. Are you willing to have a blood transfusion if it is medically needed before, during, or after your surgery? Yes   13. Have you or any of your relatives ever had problems with anesthesia? No   14. Do you have sleep apnea, excessive snoring or daytime drowsiness? UNKNOWN - moderate suspicion    15. Do you have any artifical heart valves or other implanted medical devices like a pacemaker, defibrillator, or continuous glucose monitor? No   16. Do you have artificial joints? No   17. Are you allergic to latex? No   18. Is there any chance that you may be pregnant? No       Health Care Directive:  Patient does not have a Health Care Directive or Living Will: Discussed advance care planning with patient; however, patient declined at this time.    Preoperative Review of :   reviewed - controlled substances reflected in medication list.      Status of Chronic Conditions:  See problem list for active medical problems.  Problems all longstanding and stable, except as noted/documented.  See ROS for pertinent symptoms related to these conditions.    DEPRESSION - Patient has a long history of Depression of moderate severity requiring medication for control with recent symptoms being stable..Current symptoms of depression include stable on present medications .       Review of Systems  CONSTITUTIONAL: NEGATIVE for fever, chills, change in weight  INTEGUMENTARY/SKIN: NEGATIVE for rash   ENT/MOUTH: POSITIVE for nasal congestion and NEGATIVE for epistaxis, fever and sinus pressure  RESP: NEGATIVE for significant cough or SOB  CV: NEGATIVE for chest pain, palpitations or peripheral edema  GI: NEGATIVE for nausea, abdominal pain, heartburn, or change in bowel habits  : NEGATIVE for frequency, dysuria, or hematuria  MUSCULOSKELETAL: NEGATIVE for significant arthralgias or myalgia  NEURO: NEGATIVE for weakness, dizziness or paresthesias  ENDOCRINE: NEGATIVE for temperature intolerance, skin/hair changes  HEME: NEGATIVE  "for bleeding problems  PSYCHIATRIC: NEGATIVE for changes in mood or affect    Patient Active Problem List    Diagnosis Date Noted     Chronic pansinusitis 07/19/2021     Priority: Medium     Added automatically from request for surgery [id number]       Chronic tonsillitis 07/16/2021     Priority: Medium     Sleep-disordered breathing 07/15/2021     Priority: Medium     Tonsillar hypertrophy 07/15/2021     Priority: Medium     Deviated nasal septum 07/15/2021     Priority: Medium     Chronic migraine without aura without status migrainosus, not intractable 01/21/2021     Priority: Medium     IUD (intrauterine device) in place 03/31/2020     Priority: Medium     Not done at [facility]-   mirena placed  04/2015       Current moderate episode of major depressive disorder, unspecified whether recurrent (H) 03/31/2020     Priority: Medium     Gender dysphoria in adult 07/31/2019     Priority: Medium     Acne, unspecified acne type 07/31/2019     Priority: Medium     Family history of hyperlipidemia 07/31/2019     Priority: Medium      Past Medical History:   Diagnosis Date     Gender dysphoria in adult      Past Surgical History:   Procedure Laterality Date     MASTECTOMY, BILATERAL  2016     Current Outpatient Medications   Medication Sig Dispense Refill     clindamycin phos-benzoyl perox (DUAC) 1.2-5 % external gel Apply in morning once daily. 45 g 1     eletriptan (RELPAX) 20 MG tablet Take 1 tablet (20 mg) by mouth at onset of headache for migraine May repeat in 2 hours. Max 4 tablets/24 hours. 40 tablet 1     FLUoxetine 20 MG tablet Take 4 tablets (80 mg) by mouth daily 360 tablet 1     fluticasone (FLONASE) 50 MCG/ACT nasal spray Spray 2 sprays into both nostrils daily 16 g 3     levonorgestrel (MIRENA) 20 MCG/24HR IUD 1 each by Intrauterine route once       needle, disp, 18G X 1\" MISC 1 applicator every 7 days 100 each 0     Needle, Disp, 23G X 1\" MISC 1 applicator every 7 days 100 each 0     Needle, Disp, 25G X 5/8\" " "MISC To use to inject hormones weekly 25 each 11     Syringe, Disposable, (SYRINGE LUER LOCK) 3 ML MISC 1 applicator every 7 days 100 each 0     testosterone cypionate (DEPOTESTOSTERONE) 200 MG/ML injection Inject 0.3 mLs (60 mg) into the muscle once a week 4 mL 3     tretinoin (RETIN-A) 0.025 % external gel Apply topically At Bedtime 45 g 1       No Known Allergies     Social History     Tobacco Use     Smoking status: Never Smoker     Smokeless tobacco: Never Used   Substance Use Topics     Alcohol use: Yes     Family History   Problem Relation Age of Onset     Hyperlipidemia Mother      Hyperlipidemia Father      History   Drug Use Unknown         Objective     /78 (BP Location: Right arm, Patient Position: Sitting, Cuff Size: Adult Regular)   Pulse 77   Temp 98.5  F (36.9  C) (Oral)   Resp 16   Ht 1.72 m (5' 7.72\")   Wt 87.4 kg (192 lb 9.6 oz)   SpO2 97%   Breastfeeding No   BMI 29.53 kg/m      Physical Exam    GENERAL APPEARANCE: healthy, alert and no distress     EYES: Eyes grossly normal to inspection and conjunctivae and sclerae normal     HENT: nose and mouth without ulcers or lesions, oral mucous membranes moist and normal cephalic/atraumatic     NECK: no adenopathy     RESP: lungs clear to auscultation - no rales, rhonchi or wheezes     CV: regular rates and rhythm and no murmur, click or rub     ABDOMEN: soft, nontender     MS: extremities normal- no gross deformities noted, no evidence of inflammation in joints, FROM in all extremities.     SKIN: no suspicious lesions or rashes     NEURO: Normal strength and tone, sensory exam grossly normal, mentation intact and speech normal     PSYCH: mentation appears normal. and affect normal/bright     LYMPHATICS: No cervical adenopathy    Recent Labs   Lab Test 01/21/21  0928 12/05/19  0834   HGB 16.3 15.0   .8 144   POTASSIUM 3.8 3.8   CR 1.0 1.07        Diagnostics:  Recent Results (from the past 168 hour(s))   CBC with platelets    " Collection Time: 07/30/21  9:04 AM   Result Value Ref Range    WBC Count 6.4 4.0 - 11.0 10e3/uL    RBC Count 5.05 3.80 - 5.90 10e6/uL    Hemoglobin 15.8 11.7 - 17.7 g/dL    Hematocrit 45.7 35.0 - 53.0 %    MCV 91 78 - 100 fL    MCH 31.3 26.5 - 33.0 pg    MCHC 34.6 31.5 - 36.5 g/dL    RDW 12.5 10.0 - 15.0 %    Platelet Count 343 150 - 450 10e3/uL   Basic metabolic panel    Collection Time: 07/30/21  9:04 AM   Result Value Ref Range    Sodium 140 133 - 144 mmol/L    Potassium 3.8 3.4 - 5.3 mmol/L    Chloride 110 (H) 94 - 109 mmol/L    Carbon Dioxide (CO2) 29 20 - 32 mmol/L    Anion Gap 1 (L) 3 - 14 mmol/L    Urea Nitrogen 11 7 - 30 mg/dL    Creatinine 1.16 0.52 - 1.25 mg/dL    Calcium 8.9 8.5 - 10.1 mg/dL    Glucose 92 70 - 99 mg/dL    GFR Estimate 88 >60 mL/min/1.73m2   SARS-COV2 (COVID-19) Virus RT-PCR    Collection Time: 07/30/21  9:28 AM    Specimen: Nasopharyngeal; Swab   Result Value Ref Range    SARS CoV2 PCR Negative Negative      No EKG required, no history of coronary heart disease, significant arrhythmia, peripheral arterial disease or other structural heart disease.    Revised Cardiac Risk Index (RCRI):  The patient has the following serious cardiovascular risks for perioperative complications:   - No serious cardiac risks = 0 points     RCRI Interpretation: 0 points: Class I (very low risk - 0.4% complication rate)    Assessment & Plan     The proposed surgical procedure is considered INTERMEDIATE risk.    Preop general physical exam  - CBC with platelets  - Basic metabolic panel    Nasal congestion  - CBC with platelets  - Basic metabolic panel    Tonsillar hypertrophy  - CBC with platelets  - Basic metabolic panel      Possible Sleep Apnea:    STOP-Bang Total Score 7/30/2021   Total Score 3   Risk Stratification 3 - 4: Moderate Risk for MIGUEL A          Risks and Recommendations:  The patient has the following additional risks and recommendations for perioperative complications:  Obstructive Sleep Apnea:    MIGUEL A Instructions:  -    - Hospital staff are advised to monitor for sleep related oxygen desaturations due to suspicion of  MIGUEL A      Medication Instructions:  Patient is to take all scheduled medications on the day of surgery    RECOMMENDATION:  APPROVAL GIVEN to proceed with proposed procedure, without further diagnostic evaluation.    Signed Electronically by: ELIZABETH Raya CNP  Copy of this evaluation report is provided to requesting physician.

## 2021-08-02 ENCOUNTER — MYC MEDICAL ADVICE (OUTPATIENT)
Dept: OTOLARYNGOLOGY | Facility: CLINIC | Age: 26
End: 2021-08-02

## 2021-08-02 ENCOUNTER — ANESTHESIA EVENT (OUTPATIENT)
Dept: SURGERY | Facility: AMBULATORY SURGERY CENTER | Age: 26
End: 2021-08-02

## 2021-08-02 RX ORDER — SODIUM CHLORIDE, SODIUM LACTATE, POTASSIUM CHLORIDE, CALCIUM CHLORIDE 600; 310; 30; 20 MG/100ML; MG/100ML; MG/100ML; MG/100ML
INJECTION, SOLUTION INTRAVENOUS CONTINUOUS
Status: CANCELLED | OUTPATIENT
Start: 2021-08-02

## 2021-08-02 RX ORDER — ALBUTEROL SULFATE 0.83 MG/ML
2.5 SOLUTION RESPIRATORY (INHALATION) EVERY 4 HOURS PRN
Status: CANCELLED | OUTPATIENT
Start: 2021-08-02

## 2021-08-02 RX ORDER — FENTANYL CITRATE 50 UG/ML
25 INJECTION, SOLUTION INTRAMUSCULAR; INTRAVENOUS EVERY 5 MIN PRN
Status: CANCELLED | OUTPATIENT
Start: 2021-08-02

## 2021-08-02 RX ORDER — ONDANSETRON 2 MG/ML
4 INJECTION INTRAMUSCULAR; INTRAVENOUS EVERY 30 MIN PRN
Status: CANCELLED | OUTPATIENT
Start: 2021-08-02

## 2021-08-02 RX ORDER — LIDOCAINE 40 MG/G
CREAM TOPICAL
Status: CANCELLED | OUTPATIENT
Start: 2021-08-02

## 2021-08-02 RX ORDER — FENTANYL CITRATE 50 UG/ML
25 INJECTION, SOLUTION INTRAMUSCULAR; INTRAVENOUS
Status: CANCELLED | OUTPATIENT
Start: 2021-08-02

## 2021-08-02 RX ORDER — ACETAMINOPHEN 325 MG/1
975 TABLET ORAL ONCE
Status: CANCELLED | OUTPATIENT
Start: 2021-08-02 | End: 2021-08-02

## 2021-08-02 RX ORDER — MEPERIDINE HYDROCHLORIDE 25 MG/ML
12.5 INJECTION INTRAMUSCULAR; INTRAVENOUS; SUBCUTANEOUS
Status: CANCELLED | OUTPATIENT
Start: 2021-08-02

## 2021-08-02 RX ORDER — OXYCODONE HYDROCHLORIDE 5 MG/1
10 TABLET ORAL EVERY 4 HOURS PRN
Status: CANCELLED | OUTPATIENT
Start: 2021-08-02

## 2021-08-02 RX ORDER — METOPROLOL TARTRATE 1 MG/ML
1-2 INJECTION, SOLUTION INTRAVENOUS EVERY 5 MIN PRN
Status: CANCELLED | OUTPATIENT
Start: 2021-08-02

## 2021-08-02 RX ORDER — HYDRALAZINE HYDROCHLORIDE 20 MG/ML
2.5-5 INJECTION INTRAMUSCULAR; INTRAVENOUS EVERY 10 MIN PRN
Status: CANCELLED | OUTPATIENT
Start: 2021-08-02

## 2021-08-02 RX ORDER — ONDANSETRON 4 MG/1
4 TABLET, ORALLY DISINTEGRATING ORAL EVERY 30 MIN PRN
Status: CANCELLED | OUTPATIENT
Start: 2021-08-02

## 2021-08-03 ENCOUNTER — TELEPHONE (OUTPATIENT)
Dept: OTOLARYNGOLOGY | Facility: CLINIC | Age: 26
End: 2021-08-03
Payer: COMMERCIAL

## 2021-08-03 ENCOUNTER — ANESTHESIA (OUTPATIENT)
Dept: SURGERY | Facility: AMBULATORY SURGERY CENTER | Age: 26
End: 2021-08-03

## 2021-08-03 NOTE — TELEPHONE ENCOUNTER
Dr Knight what was the verdict? Or did you already talk to the pt?    Milly ALVARENGA RN Specialty Triage 8/3/2021 11:19 AM

## 2021-09-03 ENCOUNTER — MYC MEDICAL ADVICE (OUTPATIENT)
Dept: OTOLARYNGOLOGY | Facility: CLINIC | Age: 26
End: 2021-09-03

## 2021-09-03 DIAGNOSIS — J32.0 CHRONIC RIGHT MAXILLARY SINUSITIS: Primary | ICD-10-CM

## 2021-09-03 DIAGNOSIS — J35.01 CHRONIC TONSILLITIS: ICD-10-CM

## 2021-09-08 RX ORDER — PREDNISONE 10 MG/1
10 TABLET ORAL 2 TIMES DAILY
Qty: 14 TABLET | Refills: 2 | Status: SHIPPED | OUTPATIENT
Start: 2021-09-08 | End: 2021-09-15

## 2021-09-08 NOTE — TELEPHONE ENCOUNTER
See MAINtag message. Forwarding to provider to advise.     Bárbara VARELA RN, Specialty Clinic 09/08/21 10:55 AM

## 2021-10-09 ENCOUNTER — OFFICE VISIT (OUTPATIENT)
Dept: URGENT CARE | Facility: URGENT CARE | Age: 26
End: 2021-10-09
Payer: COMMERCIAL

## 2021-10-09 VITALS
HEART RATE: 61 BPM | OXYGEN SATURATION: 96 % | WEIGHT: 190 LBS | BODY MASS INDEX: 29.13 KG/M2 | DIASTOLIC BLOOD PRESSURE: 78 MMHG | TEMPERATURE: 98.5 F | SYSTOLIC BLOOD PRESSURE: 129 MMHG

## 2021-10-09 DIAGNOSIS — J02.9 SORE THROAT: Primary | ICD-10-CM

## 2021-10-09 DIAGNOSIS — J35.1 TONSILLAR HYPERTROPHY: ICD-10-CM

## 2021-10-09 DIAGNOSIS — J34.2 DEVIATED NASAL SEPTUM: ICD-10-CM

## 2021-10-09 LAB
DEPRECATED S PYO AG THROAT QL EIA: NEGATIVE
GROUP A STREP BY PCR: NOT DETECTED

## 2021-10-09 PROCEDURE — 99213 OFFICE O/P EST LOW 20 MIN: CPT | Performed by: PHYSICIAN ASSISTANT

## 2021-10-09 PROCEDURE — 99000 SPECIMEN HANDLING OFFICE-LAB: CPT | Performed by: PHYSICIAN ASSISTANT

## 2021-10-09 PROCEDURE — U0005 INFEC AGEN DETEC AMPLI PROBE: HCPCS | Performed by: PHYSICIAN ASSISTANT

## 2021-10-09 PROCEDURE — U0003 INFECTIOUS AGENT DETECTION BY NUCLEIC ACID (DNA OR RNA); SEVERE ACUTE RESPIRATORY SYNDROME CORONAVIRUS 2 (SARS-COV-2) (CORONAVIRUS DISEASE [COVID-19]), AMPLIFIED PROBE TECHNIQUE, MAKING USE OF HIGH THROUGHPUT TECHNOLOGIES AS DESCRIBED BY CMS-2020-01-R: HCPCS | Performed by: PHYSICIAN ASSISTANT

## 2021-10-09 PROCEDURE — 87651 STREP A DNA AMP PROBE: CPT | Performed by: PHYSICIAN ASSISTANT

## 2021-10-09 ASSESSMENT — ENCOUNTER SYMPTOMS
COUGH: 0
SHORTNESS OF BREATH: 0
SORE THROAT: 1
RHINORRHEA: 0
FEVER: 0
APPETITE CHANGE: 0
MYALGIAS: 0

## 2021-10-09 NOTE — PATIENT INSTRUCTIONS
Patient Education     When You Have a Sore Throat  A sore throat can be painful. There are many reasons why you may have a sore throat. Your healthcare provider will work with you to find the cause of your sore throat. He or she will also find the best treatment for you.     What causes a sore throat?  Sore throats can be caused or worsened by:     Cold or flu viruses    Bacteria    Irritants such as tobacco smoke or air pollution    Acid reflux  A healthy throat  The tonsils are on the sides of the throat near the base of the tongue. They collect viruses and bacteria and help fight infection. The throat (pharynx) is the passage for air. Mucus from the nasal cavity also moves down the passage.   An inflamed throat  The tonsils and pharynx can become inflamed due to a cold or flu virus. Postnasal drip (excess mucus draining from the nasal cavity) can irritate the throat. It can also make the throat or tonsils more likely to be infected by bacteria. Severe, untreated tonsillitis in children or adults can cause a pocket of pus (abscess) to form near the tonsil.   Your evaluation  A health evaluation can help find the cause of your sore throat. It can also help your healthcare provider choose the best treatment for you. The evaluation may include a health history, physical exam, and diagnostic tests.   Health history  Your healthcare provider may ask you the following:     How long has the sore throat lasted and how have you been treating it?    Do you have any other symptoms, such as body aches, fever, or cough?    Does your sore throat recur? If so, how often? How many days of school or work have you missed because of a sore throat?    Do you have trouble eating or swallowing?    Have you been told that you snore or have other sleep problems?    Do you have bad breath?    Do you cough up bad-tasting mucus?  Physical exam  During the exam, your healthcare provider checks your ears, nose, and throat for problems. He  or she also checks for swelling in the neck, and may listen to your chest.   Possible tests  Other tests your healthcare provider may perform include:     A throat swab to check for bacteria such as streptococcus (the bacteria that causes strep throat)    A blood test to check for mononucleosis (a viral infection)    A chest X-ray to rule out pneumonia, especially if you have a cough  Treating a sore throat  Treatment depends on many factors. What is the likely cause? Is the problem recent? Does it keep coming back? In many cases, the best thing to do is to treat the symptoms, rest, and let the problem heal itself. Antibiotics may help clear up some bacterial infections. For cases of severe or recurring tonsillitis, the tonsils may need to be removed.   Relieving your symptoms    Don t smoke, and stay away from secondhand smoke.    For children, try throat sprays or frozen ice pops. Adults and older children may try lozenges.    Drink warm liquids to soothe the throat and help thin mucus. Stay away from alcohol, spicy foods, and acidic drinks such as orange juice. These can irritate the throat.    Gargle with warm saltwater ( 1 teaspoon of salt to  8 ounces of warm water).    Use a humidifier to keep air moist and relieve throat dryness.    Try over-the-counter pain relievers such as acetaminophen or ibuprofen. Use as directed, and don t exceed the recommended dose. Don t give aspirin to children under age17.    Are antibiotics needed?  If your sore throat is due to a bacterial infection, antibiotics may speed healing and prevent complications. Although group A streptococcus (strep throat) is the major treatable infection for a sore throat, strep throat causes only 5% to 15% of sore throats in adults who seek medical care. Most sore throats are caused by cold or flu viruses. And antibiotics don t treat viral illness. In fact, using antibiotics when they re not needed may lead to bacteria that are harder to kill.  Your healthcare provider will prescribe antibiotics only if he or she thinks they are likely to help.   If antibiotics are prescribed  Take the medicine exactly as directed. Be sure to finish your prescription even if you re feeling better. Ask your healthcare provider or pharmacist what side effects are common and what to do about them.   Is surgery needed?  In some cases, tonsils need to be removed. This is often done as outpatient (same-day) surgery. Your healthcare provider may advise removing the tonsils in cases of:     Several severe bouts of tonsillitis in a year.  Severe  episodes include those that lead to missed days of school or work, or that need to be treated with antibiotics.    Tonsillitis that causes breathing problems during sleep    Tonsillitis caused by food particles collecting in pouches in the tonsils (cryptic tonsillitis)  When to call your healthcare provider   Call your healthcare provider immediately if any of the following occur:     Problems swallowing    Symptoms worsen, or new symptoms develop.    Swollen tonsils make breathing difficult.    The pain is severe enough to keep you from drinking liquids.    If a skin rash or hives, develops, call your healthcare provider immediately. Any of these could signal an allergic reaction to antibiotics.    Symptoms don t improve within a week.    Symptoms don t improve within  2 to 3  days of starting antibiotics.  Call 911  Call 911 if any of the following occur:     Trouble breathing or problems catching your breath may be a medical emergency.    Skin is blue, purple or gray in color    Trouble talking    Feeling dizzy or faint    Feeling of doom  Sharif last reviewed this educational content on 7/1/2019 2000-2021 [employer]. All rights reserved. This information is not intended as a substitute for professional medical care. Always follow your healthcare professional's instructions.           Patient Education   After Your  "COVID-19 (Coronavirus) Test  You have been tested for COVID-19 (coronavirus).   If you'll have surgery in the next few days, we'll let you know ahead of time if you have the virus. Please call your surgeon's office with any questions.  For all other patients: Results are usually available in [employer] within 2 to 3 days.   If you do not have a [employer] account, you'll get a letter in the mail in about 7 to 10 days.   [employer] is often the fastest way to get test results. Please sign up if you do not already have a [employer] account. See the handout Getting COVID-19 Test Results in [employer] for help.  What if my test result is positive?  If your test is positive and you have not viewed your result in [employer], you'll get a phone call with your result. (A positive test means that you have the virus.)     Follow the tips under \"How do I self-isolate?\" below for 10 days (20 days if you have a weak immune system).    You don't need to be retested for COVID-19 before going back to school or work. As long as you're fever-free and feeling better, you can go back to school, work and other activities after waiting the 10 or 20 days.  What if I have questions after I get my results?  If you have questions about your results, please visit our testing website at [email or web address].   After 7 to 10 days, if you have not gotten your results:     Call [phone] ([phone]) and ask to speak with our COVID-19 results team.    If you're being treated at an infusion center: Call your infusion center directly.  What are the symptoms of COVID-19?  Cough, fever and trouble breathing are the most common signs of COVID-19.  Other symptoms can include new headaches, new muscle or body aches, new and unexplained fatigue (feeling very tired), chills, sore throat, congestion (stuffy or runny nose), diarrhea (loose poop), loss of taste or smell, belly pain, and nausea or vomiting (feeling sick to your stomach or " "throwing up).  You may already have symptoms of COVID-19, or they may show up later.  What should I do if I have symptoms?  If you're having surgery: Call your surgeon's office.  For all other patients: Stay home and away from others (self-isolate) until ...    You've had no fever--and no medicine that reduces fever--for 1 full day (24 hours), AND    Other symptoms have gotten better. For example, your cough or breathing has improved, AND    At least 10 days have passed since your symptoms first started.  How do I self-isolate?    Stay in your own room, even for meals. Use your own bathroom if you can.    Stay away from others in your home. No hugging, kissing or shaking hands. No visitors.    Don't go to work, school or anywhere else.    Clean \"high touch\" surfaces often (doorknobs, counters, handles). Use household cleaning spray or wipes. You'll find a full list of  on the EPA website: www.epa.gov/pesticide-registration/list-n-disinfectants-use-against-sars-cov-2.    Cover your mouth and nose with a mask or other face covering to avoid spreading germs.    Wash your hands and face often. Use soap and water.    Caregivers in these groups are at risk for severe illness due to COVID-19:  ? People 65 years and older  ? People who live in a nursing home or long-term care facility  ? People with chronic disease (lung, heart, cancer, diabetes, kidney, liver, immunologic)  ? People who have a weakened immune system, including those who:    Are in cancer treatment    Take medicine that weakens the immune system, such as corticosteroids    Had a bone marrow or organ transplant    Have an immune deficiency    Have poorly controlled HIV or AIDS    Are obese (body mass index of 40 or higher)    Smoke regularly    Caregivers should wear gloves while washing dishes, handling laundry and cleaning bedrooms and bathrooms.    Use caution when washing and drying laundry: Don't shake dirty laundry and use the warmest water " setting that you can.    For more tips on managing your health at home, go to www.cdc.gov/coronavirus/2019-ncov/downloads/10Things.pdf.  How can I take care of myself at home?  1. Get lots of rest. Drink extra fluids (unless a doctor has told you not to).  2. Take Tylenol (acetaminophen) for fever or pain. If you have liver or kidney problems, ask your family doctor if it's OK to take Tylenol.   Adults can take either:  ? 650 mg (two 325 mg pills) every 4 to 6 hours, or   ? 1,000 mg (two 500 mg pills) every 8 hours as needed.  ? Note: Don't take more than 3,000 mg in one day. Acetaminophen is found in many medicines (both prescribed and over-the-counter medicines). Read all labels to be sure you don't take too much.   For children, check the Tylenol bottle for the right dose. The dose is based on the child's age or weight.  3. If you have other health problems (like cancer, heart failure, an organ transplant or severe kidney disease): Call your specialty clinic if you don't feel better in the next 2 days.  4. Know when to call 911. Emergency warning signs include:  ? Trouble breathing or shortness of breath  ? Chest pain or pressure that doesn't go away  ? Feeling confused like you haven't felt before, or not being able to wake up  ? Bluish-colored lips or face  5. If your doctor prescribed a blood thinner medicine: Follow their instructions.  Where can I get more information?    [facility] - About COVID-19:   [email or web address]    CDC - If You're Sick: cdc.gov/coronavirus/2019-ncov/about/steps-when-sick.html    CDC - Ending Home Isolation: www.cdc.gov/coronavirus/2019-ncov/hcp/disposition-in-home-patients.html    CDC - Caring for Someone: www.cdc.gov/coronavirus/2019-ncov/if-you-are-sick/care-for-someone.html    [facility] - Interim Guidance for Hospital Discharge to Home: www.health.[facility].[place]/diseases/coronavirus/hcp/hospdischarge.pdf    [facility] clinical trials (COVID-19 research  studies): clinicalaffairs.[facility]-clinical-trials    Below are the COVID-19 hotlines at the [place] Department of Health ([facility]). Interpreters are available.  ? For health questions: Call 578-228-8417 or 1-164.671.7504 (7 a.m. to 7 p.m.)  ? For questions about schools and childcare: Call 904-165-3717 or 1-581.631.1971 (7 a.m. to 7 p.m.)    For informational purposes only. Not to replace the advice of your health care provider. Clinically reviewed by Infection Prevention and the [facility] COVID-19 Clinical Team. Copyright   2020 [facility]. All rights reserved. [employer] 744302 - Rev 11/11/20.

## 2021-10-09 NOTE — PROGRESS NOTES
"SUBJECTIVE:   Luis Epstein is a 26 year old adult presenting with a chief complaint of   Chief Complaint   Patient presents with     Urgent Care     Pharyngitis     x 2 days       He is an established patient of [place].   Patient presents with ST x 2 days.  No fever.  Patient covid vaccinated.      Treatment:  Cough drops  Social:  etoh socially.          Review of Systems   Constitutional: Negative for appetite change and fever.   HENT: Positive for sore throat. Negative for congestion, ear pain and rhinorrhea.    Respiratory: Negative for cough and shortness of breath.    Musculoskeletal: Negative for myalgias.   Skin: Negative for rash.   All other systems reviewed and are negative.      Past Medical History:   Diagnosis Date     Current moderate episode of major depressive disorder, unspecified whether recurrent (H)      Gender dysphoria in adult      Motion sickness      Family History   Problem Relation Age of Onset     Hyperlipidemia Mother      Hyperlipidemia Father      Current Outpatient Medications   Medication Sig Dispense Refill     clindamycin phos-benzoyl perox (DUAC) 1.2-5 % external gel Apply in morning once daily. 45 g 1     eletriptan (RELPAX) 20 MG tablet Take 1 tablet (20 mg) by mouth at onset of headache for migraine May repeat in 2 hours. Max 4 tablets/24 hours. 40 tablet 1     fluticasone (FLONASE) 50 MCG/ACT nasal spray Spray 2 sprays into both nostrils daily 16 g 3     levonorgestrel (MIRENA) 20 MCG/24HR IUD 1 each by Intrauterine route once       needle, disp, 18G X 1\" MISC 1 applicator every 7 days 100 each 0     Needle, Disp, 23G X 1\" MISC 1 applicator every 7 days 100 each 0     Needle, Disp, 25G X 5/8\" MISC To use to inject hormones weekly 25 each 11     Syringe, Disposable, (SYRINGE LUER LOCK) 3 ML MISC 1 applicator every 7 days 100 each 0     testosterone cypionate (DEPOTESTOSTERONE) 200 MG/ML injection Inject 0.3 mLs (60 mg) into the muscle once a week 4 mL 3     tretinoin " (RETIN-A) 0.025 % external gel Apply topically At Bedtime 45 g 1     FLUoxetine 20 MG tablet Take 4 tablets (80 mg) by mouth daily 360 tablet 1     Social History     Tobacco Use     Smoking status: Never Smoker     Smokeless tobacco: Never Used   Substance Use Topics     Alcohol use: Yes       OBJECTIVE  /78 (BP Location: Right arm, Patient Position: Sitting, Cuff Size: Adult Regular)   Pulse 61   Temp 98.5  F (36.9  C) (Oral)   Wt 86.2 kg (190 lb)   SpO2 96%   Breastfeeding No   BMI 29.13 kg/m      Physical Exam  Vitals and nursing note reviewed.   Constitutional:       Appearance: Normal appearance. He is normal weight.   HENT:      Head: Normocephalic and atraumatic.      Right Ear: Tympanic membrane, ear canal and external ear normal.      Left Ear: Tympanic membrane, ear canal and external ear normal.      Nose: Nose normal.      Mouth/Throat:      Mouth: Mucous membranes are moist.      Pharynx: Oropharynx is clear. Posterior oropharyngeal erythema present.      Comments: Tonsils enlarged.  Eyes:      Extraocular Movements: Extraocular movements intact.      Conjunctiva/sclera: Conjunctivae normal.   Cardiovascular:      Rate and Rhythm: Normal rate and regular rhythm.      Pulses: Normal pulses.      Heart sounds: Normal heart sounds.   Pulmonary:      Effort: Pulmonary effort is normal.      Breath sounds: Normal breath sounds.   Musculoskeletal:      Cervical back: Normal range of motion and neck supple. No muscular tenderness.   Skin:     General: Skin is warm and dry.   Neurological:      General: No focal deficit present.      Mental Status: He is alert.   Psychiatric:         Mood and Affect: Mood normal.         Behavior: Behavior normal.         Labs:  No results found for this or any previous visit (from the past 24 hour(s)).    X-Ray was not done.    ASSESSMENT:      ICD-10-CM    1. Sore throat  J02.9    2. Deviated nasal septum  J34.2    3. Tonsillar hypertrophy  J35.1         Medical  Decision Making:    Differential Diagnosis:  URI Adult/Peds:  Strep pharyngitis, Viral pharyngitis, Viral syndrome, Viral upper respiratory illness and covid      Serious Comorbid Conditions:  Adult:  as above    PLAN:    Tylenol/motrin as needed.  Drink plenty of water.  Gargle with salt water.  May use chloraseptic spray as directed for ST.  Strep pcr pending.    Discussed and recommended CDC guidelines for self isolation.  COVID test pending.          Followup:    If not improving or if condition worsens, follow up with your Primary Care Provider, If not improving or if conditions worsens over the next 12-24 hours, go to the Emergency Department    Patient Instructions     Patient Education     When You Have a Sore Throat  A sore throat can be painful. There are many reasons why you may have a sore throat. Your healthcare provider will work with you to find the cause of your sore throat. He or she will also find the best treatment for you.     What causes a sore throat?  Sore throats can be caused or worsened by:     Cold or flu viruses    Bacteria    Irritants such as tobacco smoke or air pollution    Acid reflux  A healthy throat  The tonsils are on the sides of the throat near the base of the tongue. They collect viruses and bacteria and help fight infection. The throat (pharynx) is the passage for air. Mucus from the nasal cavity also moves down the passage.   An inflamed throat  The tonsils and pharynx can become inflamed due to a cold or flu virus. Postnasal drip (excess mucus draining from the nasal cavity) can irritate the throat. It can also make the throat or tonsils more likely to be infected by bacteria. Severe, untreated tonsillitis in children or adults can cause a pocket of pus (abscess) to form near the tonsil.   Your evaluation  A health evaluation can help find the cause of your sore throat. It can also help your healthcare provider choose the best treatment for you. The evaluation may include a  health history, physical exam, and diagnostic tests.   Health history  Your healthcare provider may ask you the following:     How long has the sore throat lasted and how have you been treating it?    Do you have any other symptoms, such as body aches, fever, or cough?    Does your sore throat recur? If so, how often? How many days of school or work have you missed because of a sore throat?    Do you have trouble eating or swallowing?    Have you been told that you snore or have other sleep problems?    Do you have bad breath?    Do you cough up bad-tasting mucus?  Physical exam  During the exam, your healthcare provider checks your ears, nose, and throat for problems. He or she also checks for swelling in the neck, and may listen to your chest.   Possible tests  Other tests your healthcare provider may perform include:     A throat swab to check for bacteria such as streptococcus (the bacteria that causes strep throat)    A blood test to check for mononucleosis (a viral infection)    A chest X-ray to rule out pneumonia, especially if you have a cough  Treating a sore throat  Treatment depends on many factors. What is the likely cause? Is the problem recent? Does it keep coming back? In many cases, the best thing to do is to treat the symptoms, rest, and let the problem heal itself. Antibiotics may help clear up some bacterial infections. For cases of severe or recurring tonsillitis, the tonsils may need to be removed.   Relieving your symptoms    Don t smoke, and stay away from secondhand smoke.    For children, try throat sprays or frozen ice pops. Adults and older children may try lozenges.    Drink warm liquids to soothe the throat and help thin mucus. Stay away from alcohol, spicy foods, and acidic drinks such as orange juice. These can irritate the throat.    Gargle with warm saltwater ( 1 teaspoon of salt to  8 ounces of warm water).    Use a humidifier to keep air moist and relieve throat dryness.    Try  over-the-counter pain relievers such as acetaminophen or ibuprofen. Use as directed, and don t exceed the recommended dose. Don t give aspirin to children under age17.    Are antibiotics needed?  If your sore throat is due to a bacterial infection, antibiotics may speed healing and prevent complications. Although group A streptococcus (strep throat) is the major treatable infection for a sore throat, strep throat causes only 5% to 15% of sore throats in adults who seek medical care. Most sore throats are caused by cold or flu viruses. And antibiotics don t treat viral illness. In fact, using antibiotics when they re not needed may lead to bacteria that are harder to kill. Your healthcare provider will prescribe antibiotics only if he or she thinks they are likely to help.   If antibiotics are prescribed  Take the medicine exactly as directed. Be sure to finish your prescription even if you re feeling better. Ask your healthcare provider or pharmacist what side effects are common and what to do about them.   Is surgery needed?  In some cases, tonsils need to be removed. This is often done as outpatient (same-day) surgery. Your healthcare provider may advise removing the tonsils in cases of:     Several severe bouts of tonsillitis in a year.  Severe  episodes include those that lead to missed days of school or work, or that need to be treated with antibiotics.    Tonsillitis that causes breathing problems during sleep    Tonsillitis caused by food particles collecting in pouches in the tonsils (cryptic tonsillitis)  When to call your healthcare provider   Call your healthcare provider immediately if any of the following occur:     Problems swallowing    Symptoms worsen, or new symptoms develop.    Swollen tonsils make breathing difficult.    The pain is severe enough to keep you from drinking liquids.    If a skin rash or hives, develops, call your healthcare provider immediately. Any of these could signal an allergic  "reaction to antibiotics.    Symptoms don t improve within a week.    Symptoms don t improve within  2 to 3  days of starting antibiotics.  Call 911  Call 911 if any of the following occur:     Trouble breathing or problems catching your breath may be a medical emergency.    Skin is blue, purple or gray in color    Trouble talking    Feeling dizzy or faint    Feeling of doom  [employer]ar[employer] last reviewed this educational content on 7/1/2019 2000-2021 [employer]. All rights reserved. This information is not intended as a substitute for professional medical care. Always follow your healthcare professional's instructions.           Patient Education   After Your COVID-19 (Coronavirus) Test  You have been tested for COVID-19 (coronavirus).   If you'll have surgery in the next few days, we'll let you know ahead of time if you have the virus. Please call your surgeon's office with any questions.  For all other patients: Results are usually available in [facility] within 2 to 3 days.   If you do not have a [facility] account, you'll get a letter in the mail in about 7 to 10 days.   [facility] is often the fastest way to get test results. Please sign up if you do not already have a [employer] account. See the handout Getting COVID-19 Test Results in [facility] for help.  What if my test result is positive?  If your test is positive and you have not viewed your result in [facility], you'll get a phone call with your result. (A positive test means that you have the virus.)     Follow the tips under \"How do I self-isolate?\" below for 10 days (20 days if you have a weak immune system).    You don't need to be retested for COVID-19 before going back to school or work. As long as you're fever-free and feeling better, you can go back to school, work and other activities after waiting the 10 or 20 days.  What if I have questions after I get my results?  If you have questions about your results, please visit our testing website at " "[email or web address].   After 7 to 10 days, if you have not gotten your results:     Call [phone] ([phone]) and ask to speak with our COVID-19 results team.    If you're being treated at an infusion center: Call your infusion center directly.  What are the symptoms of COVID-19?  Cough, fever and trouble breathing are the most common signs of COVID-19.  Other symptoms can include new headaches, new muscle or body aches, new and unexplained fatigue (feeling very tired), chills, sore throat, congestion (stuffy or runny nose), diarrhea (loose poop), loss of taste or smell, belly pain, and nausea or vomiting (feeling sick to your stomach or throwing up).  You may already have symptoms of COVID-19, or they may show up later.  What should I do if I have symptoms?  If you're having surgery: Call your surgeon's office.  For all other patients: Stay home and away from others (self-isolate) until ...    You've had no fever--and no medicine that reduces fever--for 1 full day (24 hours), AND    Other symptoms have gotten better. For example, your cough or breathing has improved, AND    At least 10 days have passed since your symptoms first started.  How do I self-isolate?    Stay in your own room, even for meals. Use your own bathroom if you can.    Stay away from others in your home. No hugging, kissing or shaking hands. No visitors.    Don't go to work, school or anywhere else.    Clean \"high touch\" surfaces often (doorknobs, counters, handles). Use household cleaning spray or wipes. You'll find a full list of  on the EPA website: www.epa.gov/pesticide-registration/list-n-disinfectants-use-against-sars-cov-2.    Cover your mouth and nose with a mask or other face covering to avoid spreading germs.    Wash your hands and face often. Use soap and water.    Caregivers in these groups are at risk for severe illness due to COVID-19:  ? People 65 years and older  ? People who live " in a nursing home or long-term care facility  ? People with chronic disease (lung, heart, cancer, diabetes, kidney, liver, immunologic)  ? People who have a weakened immune system, including those who:    Are in cancer treatment    Take medicine that weakens the immune system, such as corticosteroids    Had a bone marrow or organ transplant    Have an immune deficiency    Have poorly controlled HIV or AIDS    Are obese (body mass index of 40 or higher)    Smoke regularly    Caregivers should wear gloves while washing dishes, handling laundry and cleaning bedrooms and bathrooms.    Use caution when washing and drying laundry: Don't shake dirty laundry and use the warmest water setting that you can.    For more tips on managing your health at home, go to www.cdc.gov/coronavirus/2019-ncov/downloads/10Things.pdf.  How can I take care of myself at home?  1. Get lots of rest. Drink extra fluids (unless a doctor has told you not to).  2. Take Tylenol (acetaminophen) for fever or pain. If you have liver or kidney problems, ask your family doctor if it's OK to take Tylenol.   Adults can take either:  ? 650 mg (two 325 mg pills) every 4 to 6 hours, or   ? 1,000 mg (two 500 mg pills) every 8 hours as needed.  ? Note: Don't take more than 3,000 mg in one day. Acetaminophen is found in many medicines (both prescribed and over-the-counter medicines). Read all labels to be sure you don't take too much.   For children, check the Tylenol bottle for the right dose. The dose is based on the child's age or weight.  3. If you have other health problems (like cancer, heart failure, an organ transplant or severe kidney disease): Call your specialty clinic if you don't feel better in the next 2 days.  4. Know when to call 911. Emergency warning signs include:  ? Trouble breathing or shortness of breath  ? Chest pain or pressure that doesn't go away  ? Feeling confused like you haven't felt before, or not being able to wake  up  ? Bluish-colored lips or face  5. If your doctor prescribed a blood thinner medicine: Follow their instructions.  Where can I get more information?    [facility] - About COVID-19:   [email or web address]    CDC - If You're Sick: cdc.gov/coronavirus/2019-ncov/about/steps-when-sick.html    CDC - Ending Home Isolation: www.cdc.gov/coronavirus/2019-ncov/hcp/disposition-in-home-patients.html    [facility] - Caring for Someone: www.cdc.gov/coronavirus/2019-ncov/if-you-are-sick/care-for-someone.html    [facility] - Interim Guidance for Hospital Discharge to Home: www.health.[facility].[place].us/diseases/coronavirus/hcp/hospdischarge.pdf    [facility] clinical trials (COVID-19 research studies): [email or web address]    Below are the COVID-19 hotlines at the [facility] of Health ([facility]). Interpreters are available.  ? For health questions: Call 455-365-9827 or [phone] (7 a.m. to 7 p.m.)  ? For questions about schools and childcare: Call 261-751-2737 or [phone] (7 a.m. to 7 p.m.)    For informational purposes only. Not to replace the advice of your health care provider. Clinically reviewed by Infection Prevention and the [facility] COVID-19 Clinical Team. Copyright   2020 [facility]. All rights reserved. [employer] 207879 - Rev 11/11/20.

## 2021-10-10 ENCOUNTER — HEALTH MAINTENANCE LETTER (OUTPATIENT)
Age: 26
End: 2021-10-10

## 2021-10-11 LAB — SARS-COV-2 RNA RESP QL NAA+PROBE: NOT DETECTED

## 2021-10-29 ENCOUNTER — MYC MEDICAL ADVICE (OUTPATIENT)
Dept: OTOLARYNGOLOGY | Facility: CLINIC | Age: 26
End: 2021-10-29

## 2021-10-29 DIAGNOSIS — J34.89 NASAL OBSTRUCTION: ICD-10-CM

## 2021-10-29 DIAGNOSIS — J32.0 CHRONIC RIGHT MAXILLARY SINUSITIS: Primary | ICD-10-CM

## 2021-11-04 ENCOUNTER — OFFICE VISIT (OUTPATIENT)
Dept: FAMILY MEDICINE | Facility: CLINIC | Age: 26
End: 2021-11-04
Payer: COMMERCIAL

## 2021-11-04 VITALS
RESPIRATION RATE: 18 BRPM | DIASTOLIC BLOOD PRESSURE: 78 MMHG | TEMPERATURE: 98.4 F | BODY MASS INDEX: 28.58 KG/M2 | SYSTOLIC BLOOD PRESSURE: 134 MMHG | WEIGHT: 188.6 LBS | HEART RATE: 70 BPM | HEIGHT: 68 IN | OXYGEN SATURATION: 95 %

## 2021-11-04 DIAGNOSIS — Z23 NEED FOR PROPHYLACTIC VACCINATION AND INOCULATION AGAINST INFLUENZA: ICD-10-CM

## 2021-11-04 DIAGNOSIS — G43.709 CHRONIC MIGRAINE WITHOUT AURA WITHOUT STATUS MIGRAINOSUS, NOT INTRACTABLE: ICD-10-CM

## 2021-11-04 DIAGNOSIS — J35.1 TONSILLAR HYPERTROPHY: ICD-10-CM

## 2021-11-04 DIAGNOSIS — R09.81 NASAL CONGESTION: ICD-10-CM

## 2021-11-04 DIAGNOSIS — Z30.8 ENCOUNTER FOR OTHER CONTRACEPTIVE MANAGEMENT: ICD-10-CM

## 2021-11-04 DIAGNOSIS — F64.0 GENDER DYSPHORIA IN ADULT: ICD-10-CM

## 2021-11-04 DIAGNOSIS — Z31.69 PRE-CONCEPTION COUNSELING: Primary | ICD-10-CM

## 2021-11-04 DIAGNOSIS — F32.1 CURRENT MODERATE EPISODE OF MAJOR DEPRESSIVE DISORDER, UNSPECIFIED WHETHER RECURRENT (H): ICD-10-CM

## 2021-11-04 DIAGNOSIS — Z00.00 HEALTHCARE MAINTENANCE: ICD-10-CM

## 2021-11-04 PROCEDURE — 90472 IMMUNIZATION ADMIN EACH ADD: CPT | Performed by: STUDENT IN AN ORGANIZED HEALTH CARE EDUCATION/TRAINING PROGRAM

## 2021-11-04 PROCEDURE — 90471 IMMUNIZATION ADMIN: CPT | Performed by: STUDENT IN AN ORGANIZED HEALTH CARE EDUCATION/TRAINING PROGRAM

## 2021-11-04 PROCEDURE — 90686 IIV4 VACC NO PRSV 0.5 ML IM: CPT | Performed by: STUDENT IN AN ORGANIZED HEALTH CARE EDUCATION/TRAINING PROGRAM

## 2021-11-04 PROCEDURE — 90651 9VHPV VACCINE 2/3 DOSE IM: CPT | Performed by: STUDENT IN AN ORGANIZED HEALTH CARE EDUCATION/TRAINING PROGRAM

## 2021-11-04 PROCEDURE — 99214 OFFICE O/P EST MOD 30 MIN: CPT | Mod: 25 | Performed by: STUDENT IN AN ORGANIZED HEALTH CARE EDUCATION/TRAINING PROGRAM

## 2021-11-04 RX ORDER — AZITHROMYCIN 250 MG/1
TABLET, FILM COATED ORAL
Qty: 15 TABLET | Refills: 0 | Status: SHIPPED | OUTPATIENT
Start: 2021-11-04 | End: 2021-12-10

## 2021-11-04 RX ORDER — FLUOXETINE 20 MG/1
80 TABLET, FILM COATED ORAL DAILY
Qty: 240 TABLET | Refills: 5 | Status: SHIPPED | OUTPATIENT
Start: 2021-11-04 | End: 2022-01-25

## 2021-11-04 ASSESSMENT — MIFFLIN-ST. JEOR: SCORE: 1814.98

## 2021-11-04 ASSESSMENT — PATIENT HEALTH QUESTIONNAIRE - PHQ9: SUM OF ALL RESPONSES TO PHQ QUESTIONS 1-9: 11

## 2021-11-04 NOTE — CONFIDENTIAL NOTE
Message sent to patient, will try another round of antibiotics.  Asked him to call specialty scheduling to get the procedure set up:    RIGHT endoscopic maxillary antrostomy  Endoscopic septoplasty  Bilateral tonsillectomy, possible adenoidectomy

## 2021-11-04 NOTE — PROGRESS NOTES
Assessment & Plan     Pre-conception counseling  Patient interested in carrying a pregnancy in 4 to 5 years.  Has been on testosterone for 4 to 5 years.  Patient's fiancé is also a transgender man.  They are unsure from where they would like to get sperm (from sperm bank, friend/family, other).  They may be interested in having patient's fiancé do oocyte donation.  Discussed that there is limited research on transgender men and pregnancy, but patient may need to be off of testosterone for at least 6 months for menstruation to return.  Touched on home insemination versus in clinic with reproductive endocrinology and infertility doctors.  Also discussed possibility of oocyte cryopreservation.  Patient to look into insurance coverage.  Patient interested in going to the gender support clinic with his fiancée to discuss options in more depth.  Discussed possibility of human milk production following top surgery, but that infant would most likely need supplementation.  Consider supplemental nursing system for patient or fiancé if interested in chest feeding.   - Gender Support Clinic Referral -  [facility]    Gender dysphoria in adult  Has been on testosterone for 4 to 5 years.  Takes testosterone on Wednesdays.  Had elevated testosterone to 1022 on 1/21/2021 with repeat of 831 on 2/24/2021.  Patient also with elevated cholesterol and triglycerides.  - Testosterone total  - Lipid panel reflex to direct LDL Non-fasting  - AST  - ALT  - CBC with platelets    Encounter for other contraceptive management  Due for Pap 11/26/21  Has had a Mirena in place since 4/2015. Patient does not come into contact with sperm, but does not want menstrual bleeding due to gender dysphoria.  Discussed option of immediate replacement of Mirena versus trial without the Mirena.  Discussed that patient will most likely not have menstrual bleeding due to being on testosterone.  Patient interested in removal of Mirena without replacement.   Offered patient a 2-week course of vaginal estrogen prior to Pap for vaginal atrophy, but patient not interested.  He reports not having issues with vaginal dryness.  -schedule follow up with me for Pap and mirena removal.    Nasal congestion  Tonsillar hypertrophy  Sinus Headaches  Continue to follow with ENT.  Plans to have septal deviation repair and tonsillectomy in 1 to 2 months.  Has been having sinus headaches almost daily and taking ibuprofen 3 to 4 days a week.  -If headaches do not improve after surgery, referral to neurology    Chronic migraine without aura without status migrainosus, not intractable  Patient is having migraine headaches every 2 weeks, and they resolve with triptan.  There may be a component of medication overuse headaches with high frequency of ibuprofen use.  - continue eletriptan 20 mg as needed    Healthcare maintenance  Need for prophylactic vaccination and inoculation against influenza  - Hepatitis C Screen Reflex to HCV RNA Quant and Genotype  - HIV Antigen Antibody Combo  - HPV9 (Gardasil 9 )  - INFLUENZA VACCINE IM > 6 MONTHS VALENT IIV4 (AFLURIA/FLUZONE)    Refill Request:  Current moderate episode of major depressive disorder, unspecified whether recurrent (H)  - FLUoxetine 20 MG tablet  Dispense: 240 tablet; Refill: 5      Review of external notes as documented elsewhere in note  Review of the result(s) of each unique test - total testosterone, lipids, CBC, CMP    Return in about 4 weeks (around 12/2/2021) for Follow up, with me for PAP and mirena removal.    Jodi Lemon MD  [facility] EMILIANO Smith is a 26 year old who presents for the following health issues     Patient presents with:  RECHECK: per insurance he needs to see his primary again to confirm the sinus issue. Still having congestion   IUD: discuss removal of IUD due to wanting to start a family w/partner. Luis would be the primary person to carry a baby.   Refill Request:  "fluoxetine   Imm/Inj: Flu Shot    Chronic Sinusitis:  Headaches  Septal deviation  - poor sleep due to breathing difficulties; snoring   - has never really been able to breath out of right nostril  - not taking any meds right now  - insurance didn't approve surgery (septal deviation repair and tonsillectomy)  - plan now for surgery Dec/[facility]  -Has been having headaches, feels like facial pressure, feels like a headache \"when I have a cold\"  - pressure headache 6/7 days of the week  -Takes ibuprofen 3-4 times a week for headache  - seasonal allergies in the spring time    H/o migraines  -Patient reports that migraine headaches feel different than the pressure headache described above  - migraine headaches start behind left eye, light sensitivity, throbbing pain, nausea  - triggers: chocolate, headaches, candy, skipping meals, red wine  - getting migraine headache every 2 weeks. Improves with triptan  - has never been on a prophylactic migraine med  - saw neurology as a kid for migraines    Preconception counseling  - thinking about carrying a pregnancy in 4-5 years  -Leighann is a transgender man, might want to donate oocytes for patient to carry  - been on T for 4-5 years  -Both patient and his fiancée have had top surgeries    Gender Dysphoria; on testosterone  - mirena placed 04/2015; originally placed for migraines/to stop menstruation  - no menstrual bleeding; has not had any for about 6 years  - takes testosterone on Wednesday    Health Maintenance:  - HIV, hep C -never done in epic  - Pap due at the end of the month    Chart Review:  - chronic sinus congestion, deviated septum -> been seeing ENT. Chronic sinus infections  - had surgery scheduled, but ran into insurance issue, and had to postpone  - been having severe headaches - ENT thinks sinus headahces  - h/o migraines  -> sinus headaches treated with 21 days of augmentin and 7 day course of prednisone (9/3/21) -> no improvement  - Urgent care 10/9/21: for " "pharyngitis -> symptomatic tx  - PMH: migraines, depression, on T,  - last seen at [facility] 1/21/21 Dr Brewster   - poorly controlled chronic migraine without aura. On triptan   - depression managed by psych on fluoxetine   - gender dysphoria on T for ~4 years. Labs: TT (elevated 1022), lipid (elevated cholesterol and triglycerides), CBC (Hgb 16.3), CMP (LFTs good)   - had plan to repeat labs in 1 month (02/21) -> normalized   - plan for repeat labs in 6 months     Review of Systems     Constitutional, HEENT, cardiovascular, pulmonary, gi and gu systems are negative, except as otherwise noted.      Objective    /78   Pulse 70   Temp 98.4  F (36.9  C) (Oral)   Resp 18   Ht 1.727 m (5' 8\")   Wt 85.5 kg (188 lb 9.6 oz)   SpO2 95%   BMI 28.68 kg/m    Body mass index is 28.68 kg/m .  Physical Exam  Constitutional:       Appearance: Normal appearance.   HENT:      Head: Normocephalic.      Nose:      Comments: Septum deviated to the right     Mouth/Throat:      Mouth: Mucous membranes are moist.      Pharynx: No oropharyngeal exudate.      Comments: Bilateral tonsillar hypertrophy  Eyes:      Conjunctiva/sclera: Conjunctivae normal.   Cardiovascular:      Rate and Rhythm: Normal rate and regular rhythm.      Heart sounds: Normal heart sounds.   Pulmonary:      Effort: Pulmonary effort is normal.      Breath sounds: Normal breath sounds.   Skin:     General: Skin is warm and dry.   Neurological:      Mental Status: He is alert.   Psychiatric:         Mood and Affect: Mood normal.         Behavior: Behavior normal.         Thought Content: Thought content normal.         Judgment: Judgment normal.        ----- Service Performed and Documented by Resident or Fellow ------        "

## 2021-11-04 NOTE — PATIENT INSTRUCTIONS
"Patient Education     Pre-conception counseling  Gender dysphoria in adult  Encounter for other contraceptive management  It's great that you are thinking about plans now for expanding your family in the future, so that you have the most options and the safest pregnancy!   -Things to continue thinking about: where the sperm will come from (sperm bank, friend/family, other), if you want to use your egg and/or fiance's egg, if you want to pursue egg freezing now, and what insurance will cover. If you are interested in egg banking, let me know and I will refer you to the fertility doctors ([facility]).   -You will likely need to be off testosterone for at least 6 months to get pregnant.   - [employer] group that might be helpful: \"Birthing and Breast or Chestfeeding Trans People and Allies\"  -Schedule an appointment with me for a PAP and getting your mirena removed (it has been in for almost 7 years). Since you are on testosterone, you will most likely not start having any menstrual bleeding. If you start having any bleeding, we can put in a new hormonal IUD.   - I ordered labs for you since you are on testosterone and they were abnormal at the beginning of the year. During the week of testing, please take your testosterone on Tuesday and get your labs done on Friday. You will need to make an appointment for the labs. If you can do the labs fasting, that will be helpful for getting more accurate results of your lipids/cholesterol.  -     Testosterone total; Future  -     Lipid panel reflex to direct LDL Non-fasting; Future  -     AST; Future  -     ALT; Future  -     CBC with platelets; Future  -     Gender Support Clinic Referral -  [facility]; Future    Nasal congestion  Tonsillar hypertrophy  For your chronic sinusitis, keep following with your ENT doctor. I'm hopeful that surgery will help your symptoms.   - if your headaches don't improve after surgery, I think seeing a neurologist would be a good idea.    Chronic " migraine without aura without status migrainosus, not intractable  Keep taking the relpax when you develop a migraine.     Current moderate episode of major depressive disorder, unspecified whether recurrent (H)  -     FLUoxetine 20 MG tablet; Take 4 tablets (80 mg) by mouth daily    Healthcare maintenance  These are labs that are recommended to get at least once in your life, and we don't have results for you in our system.  -     Hepatitis C Screen Reflex to HCV RNA Quant and Genotype; Future  -     HIV Antigen Antibody Combo; Future        Follow up plan  Return in about 4 weeks (around 12/2/2021) for Follow up, with me for PAP and mirena removal.    Thank you for coming to [facility] today.  Lab Testing:  **If you had lab testing today and your results are reassuring or normal they will be mailed to you or sent through [employer] within 7 days.   **If the lab tests need quick action we will call you with the results.  **If you are having labs done on a different day, please call [phone] to schedule at [facility] or [phone] for other [facility] locations. Labs do not offer walk-in appointments.  The phone number we will call with results is # [phone] (home) . If this is not the best number please call our clinic and change the number.  Medication Refills:  If you need any refills please call your pharmacy and they will contact us.   If you need to  your refill at a new pharmacy, please contact the new pharmacy directly. The new pharmacy will help you get your medications transferred faster.   Scheduling:  If you have any concerns about today's visit or wish to schedule another appointment please call our office during normal business hours [phone] (8-5:00 M-F)  If a referral was made to a [facility] and you don't get a call from central scheduling please call [phone].  If a Mammogram was ordered for you at [facility] call [phone]  to schedule or change your appointment.  If you had an EKG/XRay/CT/Ultrasound/MRI ordered the number is [phone] to schedule or change your radiology appointment.   [facility] has limited ultrasound appointments available on Wednesdays, if you would like your ultrasound at [facility], please call [phone] to schedule.   Medical Concerns:  If you have urgent medical concerns please call [phone] at any time of the day.    Jodi Lemon MD

## 2021-11-05 NOTE — PROGRESS NOTES
Preceptor Attestation:   Patient seen and discussed with the resident. Assessment and plan reviewed with resident and agreed upon.   Supervising Physician:  DO [facility]

## 2021-11-08 ENCOUNTER — TELEPHONE (OUTPATIENT)
Dept: OTOLARYNGOLOGY | Facility: CLINIC | Age: 26
End: 2021-11-08
Payer: COMMERCIAL

## 2021-11-08 NOTE — TELEPHONE ENCOUNTER
Returning patients call left a voice message for patient to call and schedule surgery at [phone]  Discussed date on Thursdays. 12/09-12/16/and 12/23

## 2021-11-09 PROBLEM — J32.0 CHRONIC RIGHT MAXILLARY SINUSITIS: Status: ACTIVE | Noted: 2021-11-09

## 2021-11-09 NOTE — TELEPHONE ENCOUNTER
Type of surgery: Septoplasty nose with turbinoplasty bilateral tonsillectomy bilateral tonsillectomy bilateral maxillary antrostomy right only   CPT 36563,39578,59142,01501  Nasal obstruction  J34.89     Deviated nasal septum J34.2     Tonsillar hypertrophy J35.1     Sleep-disordered breathing G47.30     Chronic tonsillitis J35.01    Location of surgery:  [facility]  Date and time of surgery: 12/16/2021  Surgeon: Antonia  Pre-Op Appt Date: patient will schedule at the [facility]  Post-Op Appt Date: 12/23/2021   Packet sent out: Yes  Pre-cert/Authorization completed: Prior auth required for code 57143. Submitted on [facility] website with pending Auth# [id number].  All other codes do not require prior auth when performed at an ASC.  Date: 11-9-21    Bri Red  Prior Authorization Dept  [phone]

## 2021-11-09 NOTE — TELEPHONE ENCOUNTER
Returning patients call left a voice message with dates for patient to call and let us know which one he is choosing. At [phone]

## 2021-11-16 NOTE — TELEPHONE ENCOUNTER
Fax received from [facility] needing additional information. I have sent them already the following:   OV 10/9, OV 7/7, OV 7/15 and a copy of the CT sinus pics and report of 7/16. I do not know what more I can send. They state they need this by 11/18/21 for review or it will delay or cancel the request. As I understand it there was a peer to peer done at some point after the last denial, is there something in that discussion I should include in the new submission?  Please review the fax and let me know what else can/should be sent. It seems to indicate they want some kind of rating on the sinuses and possibly another CT scan after treatment?  Thank you.

## 2021-11-22 DIAGNOSIS — Z11.59 ENCOUNTER FOR SCREENING FOR OTHER VIRAL DISEASES: ICD-10-CM

## 2021-11-23 ENCOUNTER — ANCILLARY PROCEDURE (OUTPATIENT)
Dept: CT IMAGING | Facility: CLINIC | Age: 26
End: 2021-11-23
Attending: OTOLARYNGOLOGY
Payer: COMMERCIAL

## 2021-11-23 DIAGNOSIS — J34.2 DEVIATED NASAL SEPTUM: ICD-10-CM

## 2021-11-23 DIAGNOSIS — J32.0 CHRONIC RIGHT MAXILLARY SINUSITIS: Primary | ICD-10-CM

## 2021-11-23 DIAGNOSIS — J32.0 CHRONIC RIGHT MAXILLARY SINUSITIS: ICD-10-CM

## 2021-11-23 DIAGNOSIS — J32.4 CHRONIC PANSINUSITIS: ICD-10-CM

## 2021-11-23 PROCEDURE — 70486 CT MAXILLOFACIAL W/O DYE: CPT | Mod: GC | Performed by: RADIOLOGY

## 2021-11-23 NOTE — TELEPHONE ENCOUNTER
Faxed OV notes and CT report to Maria R at [facility]. Fax# [phone]    She states that the notes that I attached when I submitted never came through. I tried to submit again on the website and it doesn't work - times out. Unable to send the actual images over fax because she said they do not come through effectively. She will attach the fax to the request and forward to the doctor that makes the determination.

## 2021-11-24 ENCOUNTER — OFFICE VISIT (OUTPATIENT)
Dept: FAMILY MEDICINE | Facility: CLINIC | Age: 26
End: 2021-11-24
Payer: COMMERCIAL

## 2021-11-24 VITALS
OXYGEN SATURATION: 97 % | HEIGHT: 69 IN | HEART RATE: 69 BPM | DIASTOLIC BLOOD PRESSURE: 82 MMHG | WEIGHT: 192.2 LBS | RESPIRATION RATE: 16 BRPM | TEMPERATURE: 97.7 F | BODY MASS INDEX: 28.47 KG/M2 | SYSTOLIC BLOOD PRESSURE: 131 MMHG

## 2021-11-24 DIAGNOSIS — Z30.432 ENCOUNTER FOR IUD REMOVAL: ICD-10-CM

## 2021-11-24 DIAGNOSIS — Z12.4 SCREENING FOR CERVICAL CANCER: Primary | ICD-10-CM

## 2021-11-24 PROCEDURE — 58301 REMOVE INTRAUTERINE DEVICE: CPT | Mod: GC | Performed by: STUDENT IN AN ORGANIZED HEALTH CARE EDUCATION/TRAINING PROGRAM

## 2021-11-24 PROCEDURE — G0145 SCR C/V CYTO,THINLAYER,RESCR: HCPCS | Performed by: STUDENT IN AN ORGANIZED HEALTH CARE EDUCATION/TRAINING PROGRAM

## 2021-11-24 ASSESSMENT — MIFFLIN-ST. JEOR: SCORE: 1845.57

## 2021-11-24 NOTE — PROCEDURES
[facility]  Procedure Note: PAP test and IUD Removal    Luis Epstein is a patient of Jodi Christine here for Pap and IUD removal   Indication: Mirena IUD in place for 6.5 years, due for cervical cancer screening    Consent: Affirmation of informed consent was discussed and given verbally. Risks, benefits, and alternatives were discussed. Patient's questions were elicited and answered.   Procedure safety checklist was completed:  N/a  Time Out (Pause for the Cause) completed: Yes, verified patient    Labs: N/A    Preoperative Diagnosis: IUD in place  Postoperative Diagnosis: Uterus without IUD    Technique:   Speculum inserted into the vagina and cervix was visualized. Sample for PAP smear collected. IUD strings were visualized. IUD extractor was used to grasp strings, patient was advised to cough while this provider gently pulled IUD out. The  T  arms of the IUD folded in during removal and the IUD slipped out through your cervix and vagina. Intact IUD confirmed and shown to patient.   EBL: minimal  Complications:  None  Tolerance:  Pt tolerated procedure well and was in stable condition.   Patholgy sent: No      Follow up: Some spotting or light bleeding is common after IUD removal. Pt was instructed to call if bleeding continues more than two days, if they fill more than one pad every 2 hours, see clots from the vagina, experience severe pain or foul smell. Recommended OTC pain relief for cramping, which should resolve the day after this procedure.   Advised patient that as of this appointment, they do NOT have a form of contraception and should use condoms or avoidance of sperm for pregnancy prevention.    Follow up as needed.      Resident: Jodi Lemon MD  Faculty: Negro Mercer DO present for and supervised this entire procedure.

## 2021-11-24 NOTE — PROGRESS NOTES
"  Assessment & Plan     Procedure visit for PAP and mirena IUD removal.    PAP:  Last PAP 11/26/18. No known history of abnormal PAP. If negative, repeat screening with cytology in 3 years. If abnormal, further management as indicated.    IUD Removal:  Mirena IUD placed 4/2015 for cessation of menses. No LMP in about 6 years. Does not come into contact with sperm. Discussed that since he has been on testosterone for several years, it's most likely that he won't start getting menses following IUD removal. If he does, we can discuss options for causing amenorrhea including replacing IUD. Discussed need for contraception if he does start coming into contact with sperm, even if pregnancy risk is low while on testosterone.       Return if symptoms worsen or fail to improve.    Jodi Lemon MD  [facility] EMILIANO Smith is a 26 year old who presents for the following health issues     Patient presents with:  Gyn Exam: pap and IUD removal     Wants IUD removed and pap  - declined ibuprofen prior to procedure     - will get T labs this Friday        Objective    /82   Pulse 69   Temp 97.7  F (36.5  C) (Oral)   Resp 16   Ht 1.75 m (5' 8.9\")   Wt 87.2 kg (192 lb 3.2 oz)   SpO2 97%   BMI 28.47 kg/m    Body mass index is 28.47 kg/m .  Physical Exam  Exam conducted with a chaperone present.   Constitutional:       Appearance: Normal appearance.   HENT:      Head: Normocephalic.   Eyes:      Conjunctiva/sclera: Conjunctivae normal.   Cardiovascular:      Rate and Rhythm: Normal rate.   Pulmonary:      Effort: Pulmonary effort is normal.   Genitourinary:     General: Normal vulva.      Exam position: Lithotomy position.      Pubic Area: No rash.       Labia:         Right: No lesion.         Left: No lesion.       Cervix: Normal.      Comments: Vaginal atrophy    Scant cervical bleeding follow pap brushing  Skin:     General: Skin is warm and dry.   Neurological:      Mental Status: " He is alert.       ----- Service Performed and Documented by Resident or Fellow ------

## 2021-11-24 NOTE — PATIENT INSTRUCTIONS
Today you were seen for a PAP test and IUD removal.    PAP:  We should receive your results in 1-2 weeks. The results will be communicated to you via [employer]. If they are normal, then we will continue routine screening. If they are abnormal, we will discuss next steps.    IUD removal:    IUD REMOVAL AFTERCARE INSTRUCTIONS     1. Uterine cramping is common for about 1 day after IUD removal. You can help relieve the discomfort with heating pads, Tylenol (acetaminophen), Aspirin or Advil (ibuprofen). If your cramping becomes very painful, please call the clinic at [phone]    2. Irregular bleeding and spotting is normal for the first few days after the IUD is removed. Call the clinic if your bleeding is excessive and not getting better.     3. Your IUD was removed without being replaced with another form of contraception. Being on testosterone is likely to prevent ovulation, but it may still be possible to become pregnant if you come into contact with sperm. You should use condoms if you come into contact with sperm if you do not want to get pregnant.    4. If you have early pregnancy symptoms like nausea and vomiting, breast tenderness, frequent urination or abdominal pain, you can take a pregnancy test. Please call the clinic if you have any concerns or if your pregnancy test is positive.       Warning Signs   Call the clinic if any of the following occurs:     - Profuse vaginal bleeding - like soaking your pads/tampons every hour for at least 2 hours or passing blood clots    - Fever    - Feeling sick or vomiting    - Worse or new pelvic pain    - Worse or new belly pain    - Dizziness/light-headedness/feeling faint        Scheduling:  If you have any concerns about today's visit or wish to schedule another appointment please call our office during normal business hours [phone] (8-5:00 M-F)  If a referral was made to a [facility] and you don't get a call from central scheduling  please call [phone].  If you had an XRay/CT/Ultrasound/MRI ordered the number is [phone] to schedule or change your radiology appointment.

## 2021-11-26 ENCOUNTER — LAB (OUTPATIENT)
Dept: LAB | Facility: CLINIC | Age: 26
End: 2021-11-26
Payer: COMMERCIAL

## 2021-11-26 DIAGNOSIS — F64.0 GENDER DYSPHORIA IN ADULT: ICD-10-CM

## 2021-11-26 DIAGNOSIS — Z00.00 HEALTHCARE MAINTENANCE: ICD-10-CM

## 2021-11-26 LAB
ALT SERPL W P-5'-P-CCNC: 51 U/L (ref 0–45)
AST SERPL W P-5'-P-CCNC: 31 U/L (ref 0–40)
ERYTHROCYTE [DISTWIDTH] IN BLOOD BY AUTOMATED COUNT: 11.7 % (ref 10–15)
HCT VFR BLD AUTO: 47.1 % (ref 35–53)
HGB BLD-MCNC: 16.1 G/DL (ref 11.7–17.7)
HIV 1+2 AB+HIV1 P24 AG SERPL QL IA: NEGATIVE
MCH RBC QN AUTO: 30.9 PG (ref 26.5–33)
MCHC RBC AUTO-ENTMCNC: 34.2 G/DL (ref 31.5–36.5)
MCV RBC AUTO: 90 FL (ref 78–100)
PLATELET # BLD AUTO: 325 10E3/UL (ref 150–450)
RBC # BLD AUTO: 5.21 10E6/UL (ref 3.8–5.9)
WBC # BLD AUTO: 6.3 10E3/UL (ref 4–11)

## 2021-11-26 PROCEDURE — 84460 ALANINE AMINO (ALT) (SGPT): CPT

## 2021-11-26 PROCEDURE — 86803 HEPATITIS C AB TEST: CPT

## 2021-11-26 PROCEDURE — 84403 ASSAY OF TOTAL TESTOSTERONE: CPT

## 2021-11-26 PROCEDURE — 84450 TRANSFERASE (AST) (SGOT): CPT

## 2021-11-26 PROCEDURE — 36415 COLL VENOUS BLD VENIPUNCTURE: CPT

## 2021-11-26 PROCEDURE — 85027 COMPLETE CBC AUTOMATED: CPT

## 2021-11-26 PROCEDURE — 87389 HIV-1 AG W/HIV-1&-2 AB AG IA: CPT

## 2021-11-29 LAB
BKR LAB AP GYN ADEQUACY: NORMAL
BKR LAB AP GYN INTERPRETATION: NORMAL
BKR LAB AP HPV REFLEX: NORMAL
BKR LAB AP PREVIOUS ABNORMAL: NORMAL
HCV AB SERPL QL IA: NONREACTIVE
PATH REPORT.COMMENTS IMP SPEC: NORMAL
PATH REPORT.COMMENTS IMP SPEC: NORMAL
PATH REPORT.RELEVANT HX SPEC: NORMAL
TESTOST SERPL-MCNC: 1149 NG/DL (ref 14–871)

## 2021-12-03 NOTE — TELEPHONE ENCOUNTER
Per [facility] website case is APPROVED.  Auth# [id number]  Date Range: 12-16-21 to 3-16-22

## 2021-12-10 ENCOUNTER — OFFICE VISIT (OUTPATIENT)
Dept: FAMILY MEDICINE | Facility: CLINIC | Age: 26
End: 2021-12-10
Payer: COMMERCIAL

## 2021-12-10 VITALS
WEIGHT: 186 LBS | SYSTOLIC BLOOD PRESSURE: 128 MMHG | OXYGEN SATURATION: 95 % | BODY MASS INDEX: 27.55 KG/M2 | DIASTOLIC BLOOD PRESSURE: 74 MMHG | RESPIRATION RATE: 16 BRPM | TEMPERATURE: 97.9 F | HEART RATE: 62 BPM

## 2021-12-10 DIAGNOSIS — J34.2 DEVIATED NASAL SEPTUM: ICD-10-CM

## 2021-12-10 DIAGNOSIS — F64.0 GENDER DYSPHORIA IN ADULT: ICD-10-CM

## 2021-12-10 DIAGNOSIS — Z01.818 PREOP GENERAL PHYSICAL EXAM: Primary | ICD-10-CM

## 2021-12-10 DIAGNOSIS — J32.0 CHRONIC RIGHT MAXILLARY SINUSITIS: ICD-10-CM

## 2021-12-10 DIAGNOSIS — J32.4 CHRONIC PANSINUSITIS: ICD-10-CM

## 2021-12-10 DIAGNOSIS — G47.30 SLEEP-DISORDERED BREATHING: ICD-10-CM

## 2021-12-10 DIAGNOSIS — Z51.81 ENCOUNTER FOR THERAPEUTIC DRUG MONITORING: ICD-10-CM

## 2021-12-10 DIAGNOSIS — J35.1 TONSILLAR HYPERTROPHY: ICD-10-CM

## 2021-12-10 PROCEDURE — 99214 OFFICE O/P EST MOD 30 MIN: CPT | Mod: GC | Performed by: STUDENT IN AN ORGANIZED HEALTH CARE EDUCATION/TRAINING PROGRAM

## 2021-12-10 NOTE — H&P (VIEW-ONLY)
[address] [address]  Phone: [phone]  Fax: [phone]  Primary Provider: Jodi Lemon  Pre-op Performing Provider: SHANA SOTELO    PREOPERATIVE EVALUATION:  Today's date: 12/10/2021    Luis Epstein is a 26 year old adult who presents for a preoperative evaluation.    Surgical Information:  Surgery/Procedure: Septoplasty, nose, with turbinoplasty; tonsillectomy; maxillary antrostomy, R  Surgery Location:  OR (outpatient)  Surgeon: Sae Knight MD  Surgery Date: 12/16/2021  Time of Surgery: 09:15 AM  Where patient plans to recover: At home with family  Fax number for surgical facility: Note does not need to be faxed, will be available electronically in Epic.    Type of Anesthesia Anticipated: General    Assessment & Plan     The proposed surgical procedure is considered INTERMEDIATE risk.    Preop general physical exam  Chronic pansinusitis  Deviated nasal septum  Sleep-disordered breathing  Tonsillar hypertrophy  26 year old presenting for preoperative evaluation prior to ENT procedure. He is generally healthy, no cardiovascular risk factors. No issues noted on prior preop done just 4 months ago, labs at that time reassuring so were not repeated today.     Gender dysphoria in adult  On chart review I did note an elevated Testosterone level of 1,149 on 11/26, after which I do not see any follow up or dose adjustment (goal range 300-1000). He is on testosterone cypionate injections (60 mg weekly) and reported feeling like he may had inadvertently been overdosing on the medication due to go over the 0.3 mL line. He has since adjusted his dose with his 2 more recent doses. Did not make any dose adjustment today, though did recommend a repeat midcycle T level in 2-4 weeks to ensure the administration adjustment is sufficient in bringing his T level back to therapeutic range.     Possible Sleep Apnea: Related to chronic  sinus issues and tonsillar hypertrophy, both of which to be addressed by this procedure.   STOP-Bang Total Score 7/30/2021   Total Score 3   Risk Stratification 3 - 4: Moderate Risk for MIGUEL A     Risks and Recommendations:  The patient has the following additional risks and recommendations for perioperative complications:   - No identified additional risk factors other than previously addressed    Medication Instructions:  Patient is to take all scheduled medications on the day of surgery    RECOMMENDATION:  APPROVAL GIVEN to proceed with proposed procedure, without further diagnostic evaluation.    Subjective     HPI related to upcoming procedure: patient with history of chronic right maxillary sinusitis, deviate nasal septum and chronic tonsillitis. Evaluated by ENT and due to issues with continuous difficulty with breathing out of nose, surgical intervention was recommended. Originally scheduled for 08/02/2021, rescheduled due to insurance delays and inability to take off work during that time.     Preop Questions 12/5/2021   1. Have you ever had a heart attack or stroke? No   2. Have you ever had surgery on your heart or blood vessels, such as a stent placement, a coronary artery bypass, or surgery on an artery in your head, neck, heart, or legs? No   3. Do you have chest pain with activity? No   4. Do you have a history of  heart failure? No   5. Do you currently have a cold, bronchitis or symptoms of other infection? YES - can't breathe through nose, reason for the surgery, nothing out of the ordinary    6. Do you have a cough, shortness of breath, or wheezing? No   7. Do you or anyone in your family have previous history of blood clots? No   8. Do you or does anyone in your family have a serious bleeding problem such as prolonged bleeding following surgeries or cuts? No   9. Have you ever had problems with anemia or been told to take iron pills? No   10. Have you had any abnormal blood loss such as black, tarry  or bloody stools, or abnormal vaginal bleeding? No   10. Have you had any abnormal blood loss such as black, tarry or bloody stools? No   11. Have you ever had a blood transfusion? No   12. Are you willing to have a blood transfusion if it is medically needed before, during, or after your surgery? Yes   13. Have you or any of your relatives ever had problems with anesthesia? No   14. Do you have sleep apnea, excessive snoring or daytime drowsiness? UNKNOWN - increased snoring past few months, seems to correlate with ENT symptoms    15. Do you have any artifical heart valves or other implanted medical devices like a pacemaker, defibrillator, or continuous glucose monitor? No   16. Do you have artificial joints? No   17. Are you allergic to latex? No   18. Is there any chance that you may be pregnant? No       Health Care Directive:  Patient does not have a Health Care Directive or Living Will: Discussed advance care planning with patient; information given to patient to review.    Preoperative Review of :   reviewed - no record of controlled substances prescribed.    Status of Chronic Conditions:  See problem list for active medical problems.  Problems all longstanding and stable, except as noted/documented.  See ROS for pertinent symptoms related to these conditions.    Gender dysphoria: on testosterone. Recent T level 1149 (slightly above goal range of 300-1000)    Review of Systems   ROS: 10 point ROS neg other than the symptoms noted above in the HPI.     Patient Active Problem List    Diagnosis Date Noted     Chronic right maxillary sinusitis 11/09/2021     Priority: Medium     Added automatically from request for surgery [id number]       Chronic pansinusitis 07/19/2021     Priority: Medium     Added automatically from request for surgery [id number]       Chronic tonsillitis 07/16/2021     Priority: Medium     Sleep-disordered breathing 07/15/2021     Priority: Medium     Tonsillar hypertrophy 07/15/2021      "Priority: Medium     Deviated nasal septum 07/15/2021     Priority: Medium     Chronic migraine without aura without status migrainosus, not intractable 01/21/2021     Priority: Medium     IUD (intrauterine device) in place 03/31/2020     Priority: Medium     Not done at [facility] placed  04/2015       Current moderate episode of major depressive disorder, unspecified whether recurrent (H) 03/31/2020     Priority: Medium     Gender dysphoria in adult 07/31/2019     Priority: Medium     Acne, unspecified acne type 07/31/2019     Priority: Medium     Family history of hyperlipidemia 07/31/2019     Priority: Medium      Past Medical History:   Diagnosis Date     Current moderate episode of major depressive disorder, unspecified whether recurrent (H)      Gender dysphoria in adult      Motion sickness      Past Surgical History:   Procedure Laterality Date     MASTECTOMY, BILATERAL  2016     Current Outpatient Medications   Medication Sig Dispense Refill     eletriptan (RELPAX) 20 MG tablet Take 1 tablet (20 mg) by mouth at onset of headache for migraine May repeat in 2 hours. Max 4 tablets/24 hours. 40 tablet 1     FLUoxetine 20 MG tablet Take 4 tablets (80 mg) by mouth daily 240 tablet 5     needle, disp, 18G X 1\" MISC 1 applicator every 7 days 100 each 0     Needle, Disp, 23G X 1\" MISC 1 applicator every 7 days 100 each 0     Syringe, Disposable, (SYRINGE LUER LOCK) 3 ML MISC 1 applicator every 7 days 100 each 0     testosterone cypionate (DEPOTESTOSTERONE) 200 MG/ML injection Inject 0.3 mLs (60 mg) into the muscle once a week 4 mL 3     levonorgestrel (MIRENA) 20 MCG/24HR IUD 1 each by Intrauterine route once (Patient not taking: Reported on 12/10/2021)         No Known Allergies     Social History     Tobacco Use     Smoking status: Never Smoker     Smokeless tobacco: Never Used   Substance Use Topics     Alcohol use: Yes     Family History   Problem Relation Age of Onset     Hyperlipidemia Mother      " Hyperlipidemia Father      History   Drug Use Unknown         Objective     /74   Pulse 62   Temp 97.9  F (36.6  C) (Oral)   Resp 16   Wt 84.4 kg (186 lb)   SpO2 95%   Breastfeeding No   BMI 27.55 kg/m      Physical Exam  Constitutional:       General: He is not in acute distress.     Appearance: He is well-developed. He is not diaphoretic.   HENT:      Right Ear: External ear normal. A middle ear effusion is present. Tympanic membrane is not erythematous.      Left Ear: External ear normal. Tympanic membrane is not erythematous.      Mouth/Throat:      Mouth: Mucous membranes are moist.      Pharynx: Uvula midline.      Tonsils: 3+ on the right. 3+ on the left.   Eyes:      Extraocular Movements: Extraocular movements intact.      Pupils: Pupils are equal, round, and reactive to light.   Cardiovascular:      Rate and Rhythm: Normal rate.   Pulmonary:      Effort: Pulmonary effort is normal. No respiratory distress.      Breath sounds: Normal breath sounds.   Abdominal:      General: There is no distension.      Palpations: Abdomen is soft.      Tenderness: There is no abdominal tenderness.   Musculoskeletal:         General: Normal range of motion.      Cervical back: Normal range of motion.      Right lower leg: No edema.      Left lower leg: No edema.   Lymphadenopathy:      Cervical: No cervical adenopathy.   Skin:     General: Skin is warm.      Coloration: Skin is not jaundiced or pale.      Findings: No rash.   Neurological:      General: No focal deficit present.      Mental Status: He is alert.      Cranial Nerves: No cranial nerve deficit.      Motor: No weakness.      Gait: Gait normal.   Psychiatric:         Mood and Affect: Mood normal.         Recent Labs   Lab Test 11/26/21  0929 07/30/21  0904 01/21/21  0928   HGB 16.1 15.8 16.3    343  --    NA  --  140 138.8   POTASSIUM  --  3.8 3.8   CR  --  1.16 1.0        Diagnostics:  No labs were ordered during this visit.   No EKG  required, no history of coronary heart disease, significant arrhythmia, peripheral arterial disease or other structural heart disease.    Revised Cardiac Risk Index (RCRI):  The patient has the following serious cardiovascular risks for perioperative complications:   - No serious cardiac risks = 0 points     RCRI Interpretation: 0 points: Class I (very low risk - 0.4% complication rate)         Signed Electronically by: Alla Raymond MD  Copy of this evaluation report is provided to requesting physician.

## 2021-12-10 NOTE — PROGRESS NOTES
[address] [address]  Phone: [phone]  Fax: [phone]  Primary Provider: Jodi Lemon  Pre-op Performing Provider: SHANA SOTELO    PREOPERATIVE EVALUATION:  Today's date: 12/10/2021    Luis Epstein is a 26 year old adult who presents for a preoperative evaluation.    Surgical Information:  Surgery/Procedure: Septoplasty, nose, with turbinoplasty; tonsillectomy; maxillary antrostomy, R  Surgery Location:  OR (outpatient)  Surgeon: Sae Knight MD  Surgery Date: 12/16/2021  Time of Surgery: 09:15 AM  Where patient plans to recover: At home with family  Fax number for surgical facility: Note does not need to be faxed, will be available electronically in Epic.    Type of Anesthesia Anticipated: General    Assessment & Plan     The proposed surgical procedure is considered INTERMEDIATE risk.    Preop general physical exam  Chronic pansinusitis  Deviated nasal septum  Sleep-disordered breathing  Tonsillar hypertrophy  26 year old presenting for preoperative evaluation prior to ENT procedure. He is generally healthy, no cardiovascular risk factors. No issues noted on prior preop done just 4 months ago, labs at that time reassuring so were not repeated today.     Gender dysphoria in adult  On chart review I did note an elevated Testosterone level of 1,149 on 11/26, after which I do not see any follow up or dose adjustment (goal range 300-1000). He is on testosterone cypionate injections (60 mg weekly) and reported feeling like he may had inadvertently been overdosing on the medication due to go over the 0.3 mL line. He has since adjusted his dose with his 2 more recent doses. Did not make any dose adjustment today, though did recommend a repeat midcycle T level in 2-4 weeks to ensure the administration adjustment is sufficient in bringing his T level back to therapeutic range.     Possible Sleep Apnea: Related to chronic  sinus issues and tonsillar hypertrophy, both of which to be addressed by this procedure.   STOP-Bang Total Score 7/30/2021   Total Score 3   Risk Stratification 3 - 4: Moderate Risk for MIGUEL A     Risks and Recommendations:  The patient has the following additional risks and recommendations for perioperative complications:   - No identified additional risk factors other than previously addressed    Medication Instructions:  Patient is to take all scheduled medications on the day of surgery    RECOMMENDATION:  APPROVAL GIVEN to proceed with proposed procedure, without further diagnostic evaluation.    Subjective     HPI related to upcoming procedure: patient with history of chronic right maxillary sinusitis, deviate nasal septum and chronic tonsillitis. Evaluated by ENT and due to issues with continuous difficulty with breathing out of nose, surgical intervention was recommended. Originally scheduled for 08/02/2021, rescheduled due to insurance delays and inability to take off work during that time.     Preop Questions 12/5/2021   1. Have you ever had a heart attack or stroke? No   2. Have you ever had surgery on your heart or blood vessels, such as a stent placement, a coronary artery bypass, or surgery on an artery in your head, neck, heart, or legs? No   3. Do you have chest pain with activity? No   4. Do you have a history of  heart failure? No   5. Do you currently have a cold, bronchitis or symptoms of other infection? YES - can't breathe through nose, reason for the surgery, nothing out of the ordinary    6. Do you have a cough, shortness of breath, or wheezing? No   7. Do you or anyone in your family have previous history of blood clots? No   8. Do you or does anyone in your family have a serious bleeding problem such as prolonged bleeding following surgeries or cuts? No   9. Have you ever had problems with anemia or been told to take iron pills? No   10. Have you had any abnormal blood loss such as black, tarry  or bloody stools, or abnormal vaginal bleeding? No   10. Have you had any abnormal blood loss such as black, tarry or bloody stools? No   11. Have you ever had a blood transfusion? No   12. Are you willing to have a blood transfusion if it is medically needed before, during, or after your surgery? Yes   13. Have you or any of your relatives ever had problems with anesthesia? No   14. Do you have sleep apnea, excessive snoring or daytime drowsiness? UNKNOWN - increased snoring past few months, seems to correlate with ENT symptoms    15. Do you have any artifical heart valves or other implanted medical devices like a pacemaker, defibrillator, or continuous glucose monitor? No   16. Do you have artificial joints? No   17. Are you allergic to latex? No   18. Is there any chance that you may be pregnant? No       Health Care Directive:  Patient does not have a Health Care Directive or Living Will: Discussed advance care planning with patient; information given to patient to review.    Preoperative Review of :   reviewed - no record of controlled substances prescribed.    Status of Chronic Conditions:  See problem list for active medical problems.  Problems all longstanding and stable, except as noted/documented.  See ROS for pertinent symptoms related to these conditions.    Gender dysphoria: on testosterone. Recent T level 1149 (slightly above goal range of 300-1000)    Review of Systems   ROS: 10 point ROS neg other than the symptoms noted above in the HPI.     Patient Active Problem List    Diagnosis Date Noted     Chronic right maxillary sinusitis 11/09/2021     Priority: Medium     Added automatically from request for surgery [id number]       Chronic pansinusitis 07/19/2021     Priority: Medium     Added automatically from request for surgery [id number]       Chronic tonsillitis 07/16/2021     Priority: Medium     Sleep-disordered breathing 07/15/2021     Priority: Medium     Tonsillar hypertrophy 07/15/2021      "Priority: Medium     Deviated nasal septum 07/15/2021     Priority: Medium     Chronic migraine without aura without status migrainosus, not intractable 01/21/2021     Priority: Medium     IUD (intrauterine device) in place 03/31/2020     Priority: Medium     Not done at [facility] placed  04/2015       Current moderate episode of major depressive disorder, unspecified whether recurrent (H) 03/31/2020     Priority: Medium     Gender dysphoria in adult 07/31/2019     Priority: Medium     Acne, unspecified acne type 07/31/2019     Priority: Medium     Family history of hyperlipidemia 07/31/2019     Priority: Medium      Past Medical History:   Diagnosis Date     Current moderate episode of major depressive disorder, unspecified whether recurrent (H)      Gender dysphoria in adult      Motion sickness      Past Surgical History:   Procedure Laterality Date     MASTECTOMY, BILATERAL  2016     Current Outpatient Medications   Medication Sig Dispense Refill     eletriptan (RELPAX) 20 MG tablet Take 1 tablet (20 mg) by mouth at onset of headache for migraine May repeat in 2 hours. Max 4 tablets/24 hours. 40 tablet 1     FLUoxetine 20 MG tablet Take 4 tablets (80 mg) by mouth daily 240 tablet 5     needle, disp, 18G X 1\" MISC 1 applicator every 7 days 100 each 0     Needle, Disp, 23G X 1\" MISC 1 applicator every 7 days 100 each 0     Syringe, Disposable, (SYRINGE LUER LOCK) 3 ML MISC 1 applicator every 7 days 100 each 0     testosterone cypionate (DEPOTESTOSTERONE) 200 MG/ML injection Inject 0.3 mLs (60 mg) into the muscle once a week 4 mL 3     levonorgestrel (MIRENA) 20 MCG/24HR IUD 1 each by Intrauterine route once (Patient not taking: Reported on 12/10/2021)         No Known Allergies     Social History     Tobacco Use     Smoking status: Never Smoker     Smokeless tobacco: Never Used   Substance Use Topics     Alcohol use: Yes     Family History   Problem Relation Age of Onset     Hyperlipidemia Mother      " Hyperlipidemia Father      History   Drug Use Unknown         Objective     /74   Pulse 62   Temp 97.9  F (36.6  C) (Oral)   Resp 16   Wt 84.4 kg (186 lb)   SpO2 95%   Breastfeeding No   BMI 27.55 kg/m      Physical Exam  Constitutional:       General: He is not in acute distress.     Appearance: He is well-developed. He is not diaphoretic.   HENT:      Right Ear: External ear normal. A middle ear effusion is present. Tympanic membrane is not erythematous.      Left Ear: External ear normal. Tympanic membrane is not erythematous.      Mouth/Throat:      Mouth: Mucous membranes are moist.      Pharynx: Uvula midline.      Tonsils: 3+ on the right. 3+ on the left.   Eyes:      Extraocular Movements: Extraocular movements intact.      Pupils: Pupils are equal, round, and reactive to light.   Cardiovascular:      Rate and Rhythm: Normal rate.   Pulmonary:      Effort: Pulmonary effort is normal. No respiratory distress.      Breath sounds: Normal breath sounds.   Abdominal:      General: There is no distension.      Palpations: Abdomen is soft.      Tenderness: There is no abdominal tenderness.   Musculoskeletal:         General: Normal range of motion.      Cervical back: Normal range of motion.      Right lower leg: No edema.      Left lower leg: No edema.   Lymphadenopathy:      Cervical: No cervical adenopathy.   Skin:     General: Skin is warm.      Coloration: Skin is not jaundiced or pale.      Findings: No rash.   Neurological:      General: No focal deficit present.      Mental Status: He is alert.      Cranial Nerves: No cranial nerve deficit.      Motor: No weakness.      Gait: Gait normal.   Psychiatric:         Mood and Affect: Mood normal.         Recent Labs   Lab Test 11/26/21  0929 07/30/21  0904 01/21/21  0928   HGB 16.1 15.8 16.3    343  --    NA  --  140 138.8   POTASSIUM  --  3.8 3.8   CR  --  1.16 1.0        Diagnostics:  No labs were ordered during this visit.   No EKG  required, no history of coronary heart disease, significant arrhythmia, peripheral arterial disease or other structural heart disease.    Revised Cardiac Risk Index (RCRI):  The patient has the following serious cardiovascular risks for perioperative complications:   - No serious cardiac risks = 0 points     RCRI Interpretation: 0 points: Class I (very low risk - 0.4% complication rate)         Signed Electronically by: Alla Raymond MD  Copy of this evaluation report is provided to requesting physician.

## 2021-12-10 NOTE — Clinical Note
I did a preop for your patient and noticed he had a recently elevated T level. Sounds like Luis was going a little over the 0.3 mL line and has since adjusted, but I did recommend he have a repeat level in 2-4 weeks. I did place this order but since you're his PCP and looks like you've seen him/been following him I'll have the result forwarded to you. Just wanted to give a heads up! Thanks, Briana

## 2021-12-12 NOTE — PATIENT INSTRUCTIONS

## 2021-12-13 ENCOUNTER — LAB (OUTPATIENT)
Dept: LAB | Facility: CLINIC | Age: 26
End: 2021-12-13
Payer: COMMERCIAL

## 2021-12-13 DIAGNOSIS — Z11.59 ENCOUNTER FOR SCREENING FOR OTHER VIRAL DISEASES: ICD-10-CM

## 2021-12-13 PROCEDURE — U0005 INFEC AGEN DETEC AMPLI PROBE: HCPCS

## 2021-12-13 PROCEDURE — U0003 INFECTIOUS AGENT DETECTION BY NUCLEIC ACID (DNA OR RNA); SEVERE ACUTE RESPIRATORY SYNDROME CORONAVIRUS 2 (SARS-COV-2) (CORONAVIRUS DISEASE [COVID-19]), AMPLIFIED PROBE TECHNIQUE, MAKING USE OF HIGH THROUGHPUT TECHNOLOGIES AS DESCRIBED BY CMS-2020-01-R: HCPCS

## 2021-12-14 LAB — SARS-COV-2 RNA RESP QL NAA+PROBE: NEGATIVE

## 2021-12-15 ENCOUNTER — ANESTHESIA EVENT (OUTPATIENT)
Dept: SURGERY | Facility: AMBULATORY SURGERY CENTER | Age: 26
End: 2021-12-15
Payer: COMMERCIAL

## 2021-12-16 ENCOUNTER — ANESTHESIA (OUTPATIENT)
Dept: SURGERY | Facility: AMBULATORY SURGERY CENTER | Age: 26
End: 2021-12-16
Payer: COMMERCIAL

## 2021-12-16 ENCOUNTER — HOSPITAL ENCOUNTER (OUTPATIENT)
Facility: AMBULATORY SURGERY CENTER | Age: 26
End: 2021-12-16
Attending: OTOLARYNGOLOGY | Admitting: OTOLARYNGOLOGY
Payer: COMMERCIAL

## 2021-12-16 VITALS
HEART RATE: 64 BPM | BODY MASS INDEX: 28.44 KG/M2 | WEIGHT: 192 LBS | RESPIRATION RATE: 16 BRPM | DIASTOLIC BLOOD PRESSURE: 87 MMHG | SYSTOLIC BLOOD PRESSURE: 128 MMHG | TEMPERATURE: 97.8 F | OXYGEN SATURATION: 96 %

## 2021-12-16 DIAGNOSIS — J35.01 CHRONIC TONSILLITIS: Primary | ICD-10-CM

## 2021-12-16 DIAGNOSIS — J34.2 DEVIATED NASAL SEPTUM: ICD-10-CM

## 2021-12-16 DIAGNOSIS — J32.0 CHRONIC RIGHT MAXILLARY SINUSITIS: ICD-10-CM

## 2021-12-16 PROCEDURE — 42826 REMOVAL OF TONSILS: CPT

## 2021-12-16 PROCEDURE — 30520 REPAIR OF NASAL SEPTUM: CPT | Performed by: OTOLARYNGOLOGY

## 2021-12-16 PROCEDURE — G8916 PT W IV AB GIVEN ON TIME: HCPCS

## 2021-12-16 PROCEDURE — 30140 RESECT INFERIOR TURBINATE: CPT | Mod: 50 | Performed by: OTOLARYNGOLOGY

## 2021-12-16 PROCEDURE — 88304 TISSUE EXAM BY PATHOLOGIST: CPT | Performed by: PATHOLOGY

## 2021-12-16 PROCEDURE — 30520 REPAIR OF NASAL SEPTUM: CPT

## 2021-12-16 PROCEDURE — 30140 RESECT INFERIOR TURBINATE: CPT | Mod: LT

## 2021-12-16 PROCEDURE — 42826 REMOVAL OF TONSILS: CPT | Performed by: OTOLARYNGOLOGY

## 2021-12-16 PROCEDURE — G8907 PT DOC NO EVENTS ON DISCHARG: HCPCS

## 2021-12-16 RX ORDER — LIDOCAINE HYDROCHLORIDE 20 MG/ML
INJECTION, SOLUTION INFILTRATION; PERINEURAL PRN
Status: DISCONTINUED | OUTPATIENT
Start: 2021-12-16 | End: 2021-12-16

## 2021-12-16 RX ORDER — ONDANSETRON 2 MG/ML
4 INJECTION INTRAMUSCULAR; INTRAVENOUS EVERY 30 MIN PRN
Status: DISCONTINUED | OUTPATIENT
Start: 2021-12-16 | End: 2021-12-17 | Stop reason: HOSPADM

## 2021-12-16 RX ORDER — ONDANSETRON 4 MG/1
4 TABLET, ORALLY DISINTEGRATING ORAL EVERY 30 MIN PRN
Status: DISCONTINUED | OUTPATIENT
Start: 2021-12-16 | End: 2021-12-17 | Stop reason: HOSPADM

## 2021-12-16 RX ORDER — METOPROLOL TARTRATE 1 MG/ML
1-2 INJECTION, SOLUTION INTRAVENOUS EVERY 5 MIN PRN
Status: DISCONTINUED | OUTPATIENT
Start: 2021-12-16 | End: 2021-12-17 | Stop reason: HOSPADM

## 2021-12-16 RX ORDER — PROPOFOL 10 MG/ML
INJECTION, EMULSION INTRAVENOUS CONTINUOUS PRN
Status: DISCONTINUED | OUTPATIENT
Start: 2021-12-16 | End: 2021-12-16

## 2021-12-16 RX ORDER — CEFAZOLIN SODIUM 2 G/100ML
2 INJECTION, SOLUTION INTRAVENOUS SEE ADMIN INSTRUCTIONS
Status: DISCONTINUED | OUTPATIENT
Start: 2021-12-16 | End: 2021-12-17 | Stop reason: HOSPADM

## 2021-12-16 RX ORDER — FENTANYL CITRATE 50 UG/ML
25 INJECTION, SOLUTION INTRAMUSCULAR; INTRAVENOUS
Status: DISCONTINUED | OUTPATIENT
Start: 2021-12-16 | End: 2021-12-17 | Stop reason: HOSPADM

## 2021-12-16 RX ORDER — LIDOCAINE 40 MG/G
CREAM TOPICAL
Status: DISCONTINUED | OUTPATIENT
Start: 2021-12-16 | End: 2021-12-17 | Stop reason: HOSPADM

## 2021-12-16 RX ORDER — ONDANSETRON 4 MG/1
4 TABLET, ORALLY DISINTEGRATING ORAL EVERY 8 HOURS PRN
Qty: 20 TABLET | Refills: 2 | Status: SHIPPED | OUTPATIENT
Start: 2021-12-16 | End: 2022-01-25

## 2021-12-16 RX ORDER — OXYCODONE HYDROCHLORIDE 5 MG/1
10 TABLET ORAL EVERY 4 HOURS PRN
Status: DISCONTINUED | OUTPATIENT
Start: 2021-12-16 | End: 2021-12-17 | Stop reason: HOSPADM

## 2021-12-16 RX ORDER — MEPERIDINE HYDROCHLORIDE 25 MG/ML
12.5 INJECTION INTRAMUSCULAR; INTRAVENOUS; SUBCUTANEOUS
Status: DISCONTINUED | OUTPATIENT
Start: 2021-12-16 | End: 2021-12-17 | Stop reason: HOSPADM

## 2021-12-16 RX ORDER — OXYCODONE HYDROCHLORIDE 5 MG/1
5 TABLET ORAL EVERY 4 HOURS PRN
Status: DISCONTINUED | OUTPATIENT
Start: 2021-12-16 | End: 2021-12-17 | Stop reason: HOSPADM

## 2021-12-16 RX ORDER — LIDOCAINE HYDROCHLORIDE AND EPINEPHRINE 10; 10 MG/ML; UG/ML
INJECTION, SOLUTION INFILTRATION; PERINEURAL PRN
Status: DISCONTINUED | OUTPATIENT
Start: 2021-12-16 | End: 2021-12-16 | Stop reason: HOSPADM

## 2021-12-16 RX ORDER — CIPROFLOXACIN 500 MG/1
500 TABLET, FILM COATED ORAL 2 TIMES DAILY
Qty: 14 TABLET | Refills: 1 | Status: SHIPPED | OUTPATIENT
Start: 2021-12-16 | End: 2021-12-23

## 2021-12-16 RX ORDER — HYDRALAZINE HYDROCHLORIDE 20 MG/ML
2.5-5 INJECTION INTRAMUSCULAR; INTRAVENOUS EVERY 10 MIN PRN
Status: DISCONTINUED | OUTPATIENT
Start: 2021-12-16 | End: 2021-12-17 | Stop reason: HOSPADM

## 2021-12-16 RX ORDER — ALBUTEROL SULFATE 0.83 MG/ML
2.5 SOLUTION RESPIRATORY (INHALATION) EVERY 4 HOURS PRN
Status: DISCONTINUED | OUTPATIENT
Start: 2021-12-16 | End: 2021-12-17 | Stop reason: HOSPADM

## 2021-12-16 RX ORDER — LIDOCAINE HYDROCHLORIDE AND EPINEPHRINE BITARTRATE 20; .01 MG/ML; MG/ML
INJECTION, SOLUTION SUBCUTANEOUS PRN
Status: DISCONTINUED | OUTPATIENT
Start: 2021-12-16 | End: 2021-12-16 | Stop reason: HOSPADM

## 2021-12-16 RX ORDER — OXYCODONE HYDROCHLORIDE 5 MG/1
5 TABLET ORAL EVERY 4 HOURS PRN
Qty: 42 TABLET | Refills: 0 | Status: SHIPPED | OUTPATIENT
Start: 2021-12-16 | End: 2022-01-25

## 2021-12-16 RX ORDER — ACETAMINOPHEN 325 MG/1
975 TABLET ORAL ONCE
Status: COMPLETED | OUTPATIENT
Start: 2021-12-16 | End: 2021-12-16

## 2021-12-16 RX ORDER — SCOLOPAMINE TRANSDERMAL SYSTEM 1 MG/1
1 PATCH, EXTENDED RELEASE TRANSDERMAL
Status: DISCONTINUED | OUTPATIENT
Start: 2021-12-16 | End: 2021-12-17 | Stop reason: HOSPADM

## 2021-12-16 RX ORDER — LIDOCAINE HYDROCHLORIDE 20 MG/ML
15 SOLUTION OROPHARYNGEAL
Qty: 300 ML | Refills: 3 | Status: SHIPPED | OUTPATIENT
Start: 2021-12-16 | End: 2022-01-25

## 2021-12-16 RX ORDER — SODIUM CHLORIDE, SODIUM LACTATE, POTASSIUM CHLORIDE, CALCIUM CHLORIDE 600; 310; 30; 20 MG/100ML; MG/100ML; MG/100ML; MG/100ML
INJECTION, SOLUTION INTRAVENOUS CONTINUOUS
Status: DISCONTINUED | OUTPATIENT
Start: 2021-12-16 | End: 2021-12-17 | Stop reason: HOSPADM

## 2021-12-16 RX ORDER — CEFAZOLIN SODIUM 2 G/100ML
2 INJECTION, SOLUTION INTRAVENOUS
Status: COMPLETED | OUTPATIENT
Start: 2021-12-16 | End: 2021-12-16

## 2021-12-16 RX ORDER — DEXAMETHASONE SODIUM PHOSPHATE 4 MG/ML
INJECTION, SOLUTION INTRA-ARTICULAR; INTRALESIONAL; INTRAMUSCULAR; INTRAVENOUS; SOFT TISSUE PRN
Status: DISCONTINUED | OUTPATIENT
Start: 2021-12-16 | End: 2021-12-16

## 2021-12-16 RX ORDER — FENTANYL CITRATE 50 UG/ML
25 INJECTION, SOLUTION INTRAMUSCULAR; INTRAVENOUS EVERY 5 MIN PRN
Status: DISCONTINUED | OUTPATIENT
Start: 2021-12-16 | End: 2021-12-17 | Stop reason: HOSPADM

## 2021-12-16 RX ORDER — ONDANSETRON 2 MG/ML
INJECTION INTRAMUSCULAR; INTRAVENOUS PRN
Status: DISCONTINUED | OUTPATIENT
Start: 2021-12-16 | End: 2021-12-16

## 2021-12-16 RX ORDER — COCAINE HYDROCHLORIDE 40 MG/ML
SOLUTION NASAL PRN
Status: DISCONTINUED | OUTPATIENT
Start: 2021-12-16 | End: 2021-12-16 | Stop reason: HOSPADM

## 2021-12-16 RX ORDER — DEXAMETHASONE SODIUM PHOSPHATE 10 MG/ML
10 INJECTION, SOLUTION INTRAMUSCULAR; INTRAVENOUS ONCE
Status: DISCONTINUED | OUTPATIENT
Start: 2021-12-16 | End: 2021-12-17 | Stop reason: HOSPADM

## 2021-12-16 RX ORDER — PROPOFOL 10 MG/ML
INJECTION, EMULSION INTRAVENOUS PRN
Status: DISCONTINUED | OUTPATIENT
Start: 2021-12-16 | End: 2021-12-16

## 2021-12-16 RX ORDER — OXYCODONE HYDROCHLORIDE 5 MG/1
5 TABLET ORAL EVERY 4 HOURS PRN
Qty: 42 TABLET | Refills: 0 | Status: SHIPPED | OUTPATIENT
Start: 2021-12-16 | End: 2021-12-16

## 2021-12-16 RX ADMIN — Medication 1 MG: at 09:32

## 2021-12-16 RX ADMIN — PROPOFOL 200 MCG/KG/MIN: 10 INJECTION, EMULSION INTRAVENOUS at 09:32

## 2021-12-16 RX ADMIN — SCOLOPAMINE TRANSDERMAL SYSTEM 1 PATCH: 1 PATCH, EXTENDED RELEASE TRANSDERMAL at 09:11

## 2021-12-16 RX ADMIN — SODIUM CHLORIDE, SODIUM LACTATE, POTASSIUM CHLORIDE, CALCIUM CHLORIDE: 600; 310; 30; 20 INJECTION, SOLUTION INTRAVENOUS at 09:29

## 2021-12-16 RX ADMIN — Medication 40 MG: at 09:32

## 2021-12-16 RX ADMIN — CEFAZOLIN SODIUM 2 G: 2 INJECTION, SOLUTION INTRAVENOUS at 09:29

## 2021-12-16 RX ADMIN — DEXAMETHASONE SODIUM PHOSPHATE 10 MG: 4 INJECTION, SOLUTION INTRA-ARTICULAR; INTRALESIONAL; INTRAMUSCULAR; INTRAVENOUS; SOFT TISSUE at 09:37

## 2021-12-16 RX ADMIN — LIDOCAINE HYDROCHLORIDE 60 MG: 20 INJECTION, SOLUTION INFILTRATION; PERINEURAL at 09:32

## 2021-12-16 RX ADMIN — ONDANSETRON 4 MG: 2 INJECTION INTRAMUSCULAR; INTRAVENOUS at 10:36

## 2021-12-16 RX ADMIN — ACETAMINOPHEN 975 MG: 325 TABLET ORAL at 08:49

## 2021-12-16 RX ADMIN — PROPOFOL 200 MG: 10 INJECTION, EMULSION INTRAVENOUS at 09:32

## 2021-12-16 RX ADMIN — OXYCODONE HYDROCHLORIDE 5 MG: 5 TABLET ORAL at 11:08

## 2021-12-16 NOTE — ANESTHESIA CARE TRANSFER NOTE
Patient: Luis Epstein    Procedure: Procedure(s):  SEPTOPLASTY, NOSE, WITH TURBINOPLASTY  TONSILLECTOMY       Diagnosis: Chronic right maxillary sinusitis [J32.0]  Deviated nasal septum [J34.2]  Chronic tonsillitis [J35.01]  Diagnosis Additional Information: No value filed.    Anesthesia Type:   General     Note:    Oropharynx: oropharynx clear of all foreign objects and oral airway in place  Level of Consciousness: awake  Oxygen Supplementation: face mask    Independent Airway: airway patency satisfactory and stable  Dentition: dentition unchanged  Vital Signs Stable: post-procedure vital signs reviewed and stable  Report to RN Given: handoff report given  Patient transferred to: PACU    Handoff Report: Identifed the Patient, Identified the Reponsible Provider, Reviewed the pertinent medical history, Discussed the surgical course, Reviewed Intra-OP anesthesia mangement and issues during anesthesia, Set expectations for post-procedure period and Allowed opportunity for questions and acknowledgement of understanding      Vitals:  Vitals Value Taken Time   BP     Temp     Pulse     Resp     SpO2         Electronically Signed By: ELIZABETH Quintero CRNA  December 16, 2021  10:58 AM

## 2021-12-16 NOTE — OP NOTE
PREOPERATIVE DIAGNOSES:   1. Deviated nasal septum.   2. Turbinate hypertrophy.   3. Nasal obstruction.   4. Chronic tonsillitis  POSTOPERATIVE DIAGNOSES:   1. Deviated nasal septum.   2. Turbinate hypertrophy.   3. Nasal obstruction.   4. Chronic tonsillitis  PROCEDURES PERFORMED:   1. Endoscopically assisted septoplasty with reimplantation of cartilage.   2. Bilateral submucous resection of inferior turbinates.   3. Bilateral tonsillectomy  SURGEON: Sae Knight MD   ASSISTANT: None  BLOOD LOSS: 10 mL.   COMPLICATIONS: None.   SPECIMENS: None.   ANESTHESIA: GETA.   INDICATIONS: Luis Epstein  presented to me with a lifelong history of chronic nasal obstruction.  On evaluation, the patient had severely deviated septum and turbinate hypertrophy. Therefore, my recommendation was for the above-named procedures. Preoperatively, risks discussed included the risks of infection, bleeding, the risks of general anesthesia, possible injury to the eyes, base of skull and tear duct system, and possible failure of the surgery to achieve the desired result. The patient understood these risks and possible outcomes and wished to proceed.   OPERATIVE PROCEDURE: After being taken to the operating room and induction of general endotracheal tube anesthesia, the bed was rotated 90 degrees.  After that, he was prepped and draped in the normal clean fashion. I began by applying topical anesthetic in the form of 2 cottonoids on each side of the nose which had been soaked with a total of 4 mL of 4% liquid cocaine. In addition, I injected 0.25% Marcaine with 1:100,000 epinephrine into the base of the columella as well as the anterior insertion of the inferior turbinates bilaterally in preparation for later submucous resection.   With the nose prepped I began with the tonsils and a shoulder roll and head turban were placed. I suspended the patient from the Lincoln stand using a Brady-Jeffrey mouthgag, and I grasped the right tonsil with an Allis  forceps and retracted medially and performed subcapsular dissection utilizing monopolar cautery, and the right tonsil came out very smoothly. I then turned my attention to the left side, once again using an Allis forceps to grasp it and retract it medially, and then I performed subcapsular dissection, and the left tonsil also came out very smoothly. I released the mouthgag for 2 minutes to allow recirculation of blood to the tongue.   I resuspended the patient from the Emelle stand using a Brady-Jeffrey mouthgag, and there was good hemostasis. I applied a thin film of the hemostatic powder to the tonsil beds bilaterally and removed the mouthgag.   We now moved on to the nasal surgery.  I removed the cottonoids from the right side of the nose and entered the right nasal cavity.   I made a septoplasty incision in the right nasal vestibule in a traditional open fashion. I then dissected down onto the left side of the cartilaginous septum through the right-sided incision. I then was able to start a mucoperichondrial pocket directly on the left side of the cartilaginous septum and inserted 0-degree endoscope to form my pocket and under direct endoscopic visualization. After I completely elevated the mucoperichondrium off the left side of the septum, I then made a hemitransfixion incision through the cartilage approximately 1.5 cm back from the anterior edge. I broke over to the right side and raised a submucoperiosteal flap on the entire right side of the nasal septum under direct endoscopic visualization. I was able to carefully tease the mucoperichondrium off the large rightward-pointing spur. After this was done, I used a swivel knife to remove the entire rhomboid portion of the cartilaginous septum, and I removed it in one large piece. It was brought to the back table and crushed in 2 pieces and then reinserted back into the mucoperichondrial pocket. I laid the flaps back together and this significantly improved the left  nasal airway. I then closed my septoplasty incision with 3 simple interrupted 4-0 chromic gut sutures.     I proceeded to the final components of the operation, which was submucous resection of inferior turbinates. I switched to a 2 inferior turbinate blade and started on the left side. I made a stab incision at the anterior insertion of the left inferior turbinate and raised a submucoperiosteal tunnel along the medial surface of the left inferior turbinate bone. I then slowly withdrew the shaver blade as I ran the shaver to perform my submucous resection.   I then performed the same procedure on the right side, once again using the 2 mm turbinate blade to make a stab incision at the anterior insertion of the right inferior turbinate blade. I raised a submucoperiosteal tunnel along the entire medial surface of the right inferior turbinate bone and slowly withdrew the shaver as I ran the shaver function to perform submucous resection.   At this point, the entire procedure was now complete. I reinspected both sides of the nose and there was good hemostasis.  All instruments were accounted for and all counts were correct. The patient's bed was rotated 90 degrees back to the care of anesthesia. He was awakened, extubated and sent to the recovery room in good condition.

## 2021-12-16 NOTE — DISCHARGE INSTRUCTIONS
[facility]  Same-Day Surgery   Adult Discharge Orders & Instructions   For 24 hours after surgery  1. Get plenty of rest.  A responsible adult must stay with you for at least 24 hours after you leave the hospital.   2. Do not drive or use heavy equipment.  If you have weakness or tingling, don't drive or use heavy equipment until this feeling goes away.  3. Do not drink alcohol.  4. Avoid strenuous or risky activities.  Ask for help when climbing stairs.   5. You may feel lightheaded.  IF so, sit for a few minutes before standing.  Have someone help you get up.   6. If you have nausea (feel sick to your stomach): Drink only clear liquids such as apple juice, ginger ale, broth or 7-Up.  Rest may also help.  Be sure to drink enough fluids.  Move to a regular diet as you feel able.  7. You may have a slight fever. Call the doctor if your fever is over 100 F (37.7 C) (taken under the tongue) or lasts longer than 24 hours.  8. You may have a dry mouth, a sore throat, muscle aches or trouble sleeping.  These should go away after 24 hours.  9. Do not make important or legal decisions.   Call your doctor for any of the followin.  Signs of infection (fever, growing tenderness at the surgery site, a large amount of drainage or bleeding, severe pain, foul-smelling drainage, redness, swelling).    2. It has been over 8 to 10 hours since surgery and you are still not able to urinate (pass water).    3.  Headache for over 24 hours.    4.  Numbness, tingling or weakness the day after surgery (if you had spinal anesthesia).  To contact a doctor, call ___________________________     Postoperative Care for Tonsillectomy (with or without adenoidectomy)    Recovery - There are a handful of issues that routinely occur during recover that should be anticipated during your recovery.    1. The pain and swelling almost always gets worse before it gets better, this is normal.  Usually it peaks 3 to 5 days after  the surgery, and then begins improving at 7 to 8 days after surgery.  Of course, this is variable from person to person.  2. The only dietary restriction is avoidance of hard or crunchy things until I see you in follow up.  If it makes a noise when you bite it, it is too hard.  Although it is good to begin eating again from day one, it is not unusual to not eat for several days after the procedure.  The most important thing is staying hydrated.  Drink fluids with electrolytes if possible, such as sports drinks.  3. The pain medication you were sent home with can make some people very nauseated.  To minimize this, avoid taking it on an empty stomach, or take smaller does with greater frequency.  For example if your dose is 2 teaspoons every four hours, try taking one teaspoon every two hours, etc.  4. Antibiotic are sometimes given after surgery, not to prevent infection, but some research shows that it helps to decrease pain.  This is not absolutely proven, and therefore is not absolutely necessary.   5. Try to stay ahead of the pain.  In other words, do not wait for pain medication to completely wear off before taking more pain medicine.  Instead, take the medication every 4 to 6 hours, even if it requires setting an alarm clock at night.  This is especially helpful during the first 5 days.  6. The uvula ( the small hanging object in the back of your mouth) frequently swells up after tonsillectomy, but will go back to normal.  This swelling can temporarily cause the sensation of something being stuck in your throat, it will go away with recovery.  Also, because of the arrangement of nerves under where the tonsils were, sharp ear pain is very common during recovery, and will also go away with recovery.   7. With adenoidectomy, a very strong and foul-smelling odor can occur about 4-7 days after surgery.  This fades rapidly, and unless there is an associated fever no antibiotics are necessary.  8. It is very common  after tonsillectomy to experience ear pain. This is due to nerves on the side of the throat becoming inflamed, and causing the perception of sudden episodes of ear pain.  This can be controlled with the same pain medication given for the surgery.     Activity - Avoid heavy lifting (greater than 20 pounds), strenuous exercise, or extremely cold environments until the follow up appointment.  Also, try to sleep with your head elevated.  An irritated cough from the breathing tube is fairly normal after surgery.    Medications - Except blood thinners, almost all medication can be re-started after tonsillectomy.      Complications - Bleeding is by far the most common complication after tonsillectomy.  If there are a few small drops or streaks of blood in the saliva that then goes away, this can be conservatively watched.  Gentle gargling with the ice water can also help stop this minor bleeding.  However, if the bleeding is persistent, or heavy bleeding occurs, do not hesitate.  Go to the emergency room to be evaluated.    Follow up - I like to see my patients about 2 weeks after the procedure to make sure that everything is healing appropriately.  Occasionally, there can be some longer - lasting side effects of surgery such as abnormal tongue sensations, or unusual swallowing.  However, if everything is healing well, the 2 week postoperative visit is all that will be necessary.    If there are any questions or issues with the above, or if there are other issues that concern you, always feel free to call the clinic and I am happy to speak with you as soon as I can.    Tony Knight MD   Otolaryngology  [facility]  [phone] After hours, [facility] option    Instructions for Septoplasty    Recovery - Everyone recovers differently from a general anesthetic.  Symptoms such as fatigue, nausea, lightheadedness, and sometimes a low grade fever (up to 100 degrees) are not unusual.  As your body  removes the anesthetic drugs from circulation, these symptoms will resolve.  Your nose will be sore and throbbing after surgery, and you may even have symptoms similar to a sinus infection with headache, congestion, and pressure.  These will resolve with healing.  For several days you may experience bloody drainage from the nose, please use the drip pad as necessary for this.  If there is persistent bleeding, please call the office during business hours or the on call ENT physician after hours.  It is common for the front teeth to ache after septoplasty and sinus surgery.  This resolves with healing.  There are no diet restrictions after septoplasty, and you can resume your home medications.  Please blow your nose very very gently for two weeks after surgery.  Limit your activity to no strenuous activities until I see you for the first follow up visit, and sleep with your head elevated.    Medications - You were sent home with narcotic pain medication.  If you can tolerate the discomfort during your recovery by using just plain Tylenol or ibuprofen (advil), please do so.  However, do not hesitate to use the stronger pain medication if needed.  If you were sent home with an antibiotic, it is primarily used to help the healing process.  If it causes loose bowel movements or other signs of intolerance, it is appropriate to discontinue it.      Complications - Problems related to septoplasty almost always are detected during the operation, and special instruction will be given in that situation.  However, unexpected things can happen.  If you experience persistent bleeding, fevers, changes in vision, and severe headaches, this may be the sign of an infection.  Any of these symptoms should be called into my office or to the on call ENT if after hours.   The most common non-emergency distant complication of septoplasty is the septum healing crooked.  Although rare, this can happen.  There may be small plastic pieces  placed inside your nose during surgery (splints).  These help to promote the septum into healing in its straightened position, and will be removed at the follow up visit.    Follow up - As mentioned, splints may be removed at the follow up visit.  This is not as bad as it sounds.  And afterwards, the improvement you will expereince in breathing from the septoplasty is usually dramatic and immediate.  I will then see you 4 to 6 weeks after that visit to make sure that everything has healed appropriately.    If there are any questions or issues with the above, or if there are other issues that concern you, always feel free to call the clinic and I am happy to speak with you as soon as I can.    Tony Knight MD  Otolaryngology  [facility]  [phone] After hours, [facility] option    Start sinus rinses tomorrow:  3 times a day    SCOPALAMINE Patch placed behind right ear for nausea relief.  Remove the patch in 24-26 hours.  Dispose in trash and wash hands carefully.     Information for Patients Discharging with a Transderm Scopolamine Patch       Dry mouth is a common side effect.    Drowsiness is another common side effect especially when combined with pain medication.  Please avoid activities that require mental alertness such as driving a car or making important legal decisions.    Since Scopolamine can cause temporary dilation of the pupils and blurred vision if it comes in contact with the eyes; be sure to wash your hands thoroughly with soap and water immediately after handling the patch.   When you remove your patch, please stick it to a tissue or paper towel for disposal.      Remove the patch immediately and contact a physician in the unlikely event that you experience symptoms of acute glaucoma (pain and reddening of the eyes, accompanied by dilated pupils).  Remove the patch if you develop any difficulties urinating.  If you cannot urinate after removing your patch, please notify  your surgeon.

## 2021-12-16 NOTE — ANESTHESIA POSTPROCEDURE EVALUATION
Patient: Luis Epstein    Procedure: Procedure(s):  SEPTOPLASTY, NOSE, WITH TURBINOPLASTY  TONSILLECTOMY       Diagnosis:Chronic right maxillary sinusitis [J32.0]  Deviated nasal septum [J34.2]  Chronic tonsillitis [J35.01]  Diagnosis Additional Information: No value filed.    Anesthesia Type:  General    Note:  Disposition: Outpatient   Postop Pain Control: Uneventful            Sign Out: Well controlled pain   PONV: No   Neuro/Psych: Uneventful            Sign Out: Acceptable/Baseline neuro status   Airway/Respiratory: Uneventful            Sign Out: AIRWAY IN SITU/Resp. Support   CV/Hemodynamics: Uneventful            Sign Out: Acceptable CV status   Other NRE: NONE   DID A NON-ROUTINE EVENT OCCUR? No           Last vitals:  Vitals Value Taken Time   /90 12/16/21 1115   Temp 36.6  C (97.8  F) 12/16/21 1050   Pulse     Resp 16 12/16/21 1115   SpO2 96 % 12/16/21 1115       Electronically Signed By: Edison Torres MD  December 16, 2021  3:53 PM

## 2021-12-16 NOTE — ANESTHESIA PROCEDURE NOTES
Airway       Patient location during procedure: OR       Procedure Start/Stop Times: 12/16/2021 9:35 AM  Staff -        Performed By: CRNAIndications and Patient Condition       Indications for airway management: [place]-procedural       Induction type:intravenous       Mask difficulty assessment: 1 - vent by mask    Final Airway Details       Final airway type: endotracheal airway       Successful airway: ETT - single and BRANDI  Endotracheal Airway Details        ETT size (mm): 7.0       Cuffed: yes       Successful intubation technique: direct laryngoscopy       DL Blade Type: Owens 2       Grade View of Cords: 1       Adjucts: tooth guard and stylet       Position: Center    Post intubation assessment        Placement verified by: capnometry, equal breath sounds and chest rise        Number of attempts at approach: 1       Number of other approaches attempted: 0       Ease of procedure: easy       Dentition: Intact

## 2021-12-17 NOTE — PROGRESS NOTES
"History of Present Illness - Luis Epstein is a 26 year old adult who is status post septoplasty, tonsillectomy on 12/15/2021.  The patient tells me that other than the sense of congestion, they have had no problems.  No headaches, fevers, chills, or change in vision.      /87   Pulse 69   Resp 16   Ht 1.75 m (5' 8.9\")   Wt 84.4 kg (186 lb)   SpO2 98%   BMI 27.55 kg/m      General - The patient is well nourished and well developed, and appears to have good nutritional status.  Alert and oriented to person and place, answers questions and cooperates with examination appropriately.   Head and Face - Normocephalic and atraumatic, with no gross asymmetry noted of the contour of the facial features.  The facial nerve is intact, with strong symmetric movements.  Eyes - Extraocular movements intact, and the pupils were reactive to light.  Sclera were not icteric or injected, conjunctiva were pink and moist.  Mouth - Examination of the oral cavity shows pink, healthy, moist mucosa.  No lesions or ulceration noted.  The dentition are in good repair.  The tongue is mobile and midline. The tonsil beds have appropriate eschar, no clots or bleeding  Nose - After removing the splints and debris, everything looks great.  The incision is well healed and the turbinates are well lateralized. No sign of synechiae or infection.    A/P - Luis Epstein has had a nice result from septoplasty.  The position of the septum and nasal tip look good.  I will see the patient in one week for the second post op visit and endoscopy.    "

## 2021-12-21 LAB
PATH REPORT.COMMENTS IMP SPEC: NORMAL
PATH REPORT.COMMENTS IMP SPEC: NORMAL
PATH REPORT.FINAL DX SPEC: NORMAL
PATH REPORT.GROSS SPEC: NORMAL
PATH REPORT.MICROSCOPIC SPEC OTHER STN: NORMAL
PATH REPORT.RELEVANT HX SPEC: NORMAL
PHOTO IMAGE: NORMAL

## 2021-12-22 NOTE — PATIENT INSTRUCTIONS
Scheduling Information  To schedule your CT/MRI scan, please contact [facility] at [phone] OR [facility] at [phone]    To schedule your Surgery, please contact our Specialty Schedulers at [phone]      ENT Clinic Locations Clinic Hours Telephone Number     [facility]  [address] [place] [address]   Monday:           1:00pm -- 5:00pm    Friday:              8:00am - 12:00pm   To schedule/reschedule an appointment with   Dr. Knight,   please contact our   Specialty Scheduling Department at:     [phone]       [facility]  [address] [place] [address] Tuesday:          8:00am -- 2:00pm         Urgent Care Locations Clinic Hours Telephone Numbers     [facility]  [address] [place] [address]     Monday-Friday:     11:00am - 9:00pm    Saturday-Sunday:  9:00am - 5:00pm   [phone]     [facility]  [address] [place] [address]     Monday-Friday:      5:00pm - 9:00pm     Saturday-Sunday:  9:00am - 5:00pm   [phone]

## 2021-12-23 ENCOUNTER — OFFICE VISIT (OUTPATIENT)
Dept: OTOLARYNGOLOGY | Facility: CLINIC | Age: 26
End: 2021-12-23
Payer: COMMERCIAL

## 2021-12-23 VITALS
WEIGHT: 186 LBS | RESPIRATION RATE: 16 BRPM | OXYGEN SATURATION: 98 % | HEART RATE: 69 BPM | SYSTOLIC BLOOD PRESSURE: 138 MMHG | HEIGHT: 69 IN | BODY MASS INDEX: 27.55 KG/M2 | DIASTOLIC BLOOD PRESSURE: 87 MMHG

## 2021-12-23 DIAGNOSIS — J34.2 DEVIATED NASAL SEPTUM: Primary | ICD-10-CM

## 2021-12-23 PROCEDURE — 99024 POSTOP FOLLOW-UP VISIT: CPT | Performed by: OTOLARYNGOLOGY

## 2021-12-23 ASSESSMENT — PAIN SCALES - GENERAL: PAINLEVEL: MILD PAIN (2)

## 2021-12-23 ASSESSMENT — MIFFLIN-ST. JEOR: SCORE: 1817.48

## 2021-12-23 NOTE — LETTER
"    12/23/2021         RE: Luis Epstein  [address] [facility] [address]        Dear Colleague,    Thank you for referring your patient, Luis Epstein, to the [facility]. Please see a copy of my visit note below.    History of Present Illness - Luis Epstein is a 26 year old adult who is status post septoplasty, tonsillectomy on 12/15/2021.  The patient tells me that other than the sense of congestion, they have had no problems.  No headaches, fevers, chills, or change in vision.      /87   Pulse 69   Resp 16   Ht 1.75 m (5' 8.9\")   Wt 84.4 kg (186 lb)   SpO2 98%   BMI 27.55 kg/m      General - The patient is well nourished and well developed, and appears to have good nutritional status.  Alert and oriented to person and place, answers questions and cooperates with examination appropriately.   Head and Face - Normocephalic and atraumatic, with no gross asymmetry noted of the contour of the facial features.  The facial nerve is intact, with strong symmetric movements.  Eyes - Extraocular movements intact, and the pupils were reactive to light.  Sclera were not icteric or injected, conjunctiva were pink and moist.  Mouth - Examination of the oral cavity shows pink, healthy, moist mucosa.  No lesions or ulceration noted.  The dentition are in good repair.  The tongue is mobile and midline. The tonsil beds have appropriate eschar, no clots or bleeding  Nose - After removing the splints and debris, everything looks great.  The incision is well healed and the turbinates are well lateralized. No sign of synechiae or infection.    A/P - Luis Epstein has had a nice result from septoplasty.  The position of the septum and nasal tip look good.  I will see the patient in one week for the second post op visit and endoscopy.        Again, thank you for allowing me to participate in the care of your patient.        Sincerely,        Sae Knight MD    "

## 2022-01-01 NOTE — PROGRESS NOTES
"Post Op Sinus 2    History of Present Illness - Luis Epstein is a 26 year old adult who is status post septoplasty, tonsillectomy on 12/15/2021.     There was the expected amount of congestion which has now mostly resolved, and no bleeding has occurred.  The generalized facial pressure has been resolving, and there have been no fevers or chills since the first postoperative visit.  The sinus rinses are continuing three times per day, and are being tolerated well.  No visual changes.    Physical Exam -   /76   Pulse 67   Ht 1.727 m (5' 8\")   Wt 84.6 kg (186 lb 9.6 oz)   SpO2 97%   BMI 28.37 kg/m      General - The patient is awake and alert, and answers questions appropriately during the history and physical.  The vocal quality is hypernasal, but there is no dyspnea or stridor noted.  Eyes - The EOMI, there is no conjuncitval or scleral injection.  Pupils are equally round and reactive to light.  Oral - The oral mucosa is pink and moist.  The tongue is mobile and midline on protrusion, no edema noted.      To evaluate the nose in the postoperative state I performed rigid nasal endoscopy.  I first sprayed with lidocaine and neosynephrine.  I began with the LEFT side using a 2.7mm 30 degree rigid nasal endoscope, and color photographs were taken for the medical record.    The middle meatus was open, and I was able to pass the scope through.  The LEFT maxillary antrostomy is open, and looking down and into the LEFT maxillary sinus, the mucosa looks healthy, no abnormal secretions.  Going further back, the ethmoid roof is nicely re-mucosalized, and there is no abnormal secretions or polypoid degeneration.    I moved on to the RIGHT side.  The anterior septum is slightly edematous, or slightly deflected into the airway, but I was able to pass by it.    The right side was then examined.  The middle meatus was open and I visualized the RIGHT antrostomy was open.  Looking down into the RIGHT maxillary sinus, the " mucosa was flat and healthy in appearance.  Going further back the ethmoid system looks good.  The mucosa is healthy, and there were polyps or polypoid degenerations.        A/P - Luis S Somes is status post endoscopic nasal surgery and does not show any signs of complications.  I will see the patient back as needed if there are any problems.  I have discussed the use of continued sinus risnes.  They have no restrictions at this point, and should call if there are any signs of sinusitis.

## 2022-01-06 ENCOUNTER — OFFICE VISIT (OUTPATIENT)
Dept: OTOLARYNGOLOGY | Facility: CLINIC | Age: 27
End: 2022-01-06
Payer: COMMERCIAL

## 2022-01-06 VITALS
HEART RATE: 67 BPM | HEIGHT: 68 IN | DIASTOLIC BLOOD PRESSURE: 76 MMHG | BODY MASS INDEX: 28.28 KG/M2 | WEIGHT: 186.6 LBS | SYSTOLIC BLOOD PRESSURE: 118 MMHG | OXYGEN SATURATION: 97 %

## 2022-01-06 DIAGNOSIS — J32.0 CHRONIC RIGHT MAXILLARY SINUSITIS: Primary | ICD-10-CM

## 2022-01-06 PROCEDURE — 99213 OFFICE O/P EST LOW 20 MIN: CPT | Mod: 24 | Performed by: OTOLARYNGOLOGY

## 2022-01-06 ASSESSMENT — MIFFLIN-ST. JEOR: SCORE: 1805.91

## 2022-01-06 NOTE — LETTER
"    1/6/2022         RE: Luis Epstein  [address] [facility] [address]        Dear Colleague,    Thank you for referring your patient, Luis Epstein, to the [facility]. Please see a copy of my visit note below.    Post Op Sinus 2    History of Present Illness - Luis Epstein is a 26 year old adult who is status post septoplasty, tonsillectomy on 12/15/2021.     There was the expected amount of congestion which has now mostly resolved, and no bleeding has occurred.  The generalized facial pressure has been resolving, and there have been no fevers or chills since the first postoperative visit.  The sinus rinses are continuing three times per day, and are being tolerated well.  No visual changes.    Physical Exam -   /76   Pulse 67   Ht 1.727 m (5' 8\")   Wt 84.6 kg (186 lb 9.6 oz)   SpO2 97%   BMI 28.37 kg/m      General - The patient is awake and alert, and answers questions appropriately during the history and physical.  The vocal quality is hypernasal, but there is no dyspnea or stridor noted.  Eyes - The EOMI, there is no conjuncitval or scleral injection.  Pupils are equally round and reactive to light.  Oral - The oral mucosa is pink and moist.  The tongue is mobile and midline on protrusion, no edema noted.      To evaluate the nose in the postoperative state I performed rigid nasal endoscopy.  I first sprayed with lidocaine and neosynephrine.  I began with the LEFT side using a 2.7mm 30 degree rigid nasal endoscope, and color photographs were taken for the medical record.    The middle meatus was open, and I was able to pass the scope through.  The LEFT maxillary antrostomy is open, and looking down and into the LEFT maxillary sinus, the mucosa looks healthy, no abnormal secretions.  Going further back, the ethmoid roof is nicely re-mucosalized, and there is no abnormal secretions or polypoid degeneration.    I moved on to the RIGHT side.  The anterior septum is " slightly edematous, or slightly deflected into the airway, but I was able to pass by it.    The right side was then examined.  The middle meatus was open and I visualized the RIGHT antrostomy was open.  Looking down into the RIGHT maxillary sinus, the mucosa was flat and healthy in appearance.  Going further back the ethmoid system looks good.  The mucosa is healthy, and there were polyps or polypoid degenerations.        A/P - Luis S Somes is status post endoscopic nasal surgery and does not show any signs of complications.  I will see the patient back as needed if there are any problems.  I have discussed the use of continued sinus risnes.  They have no restrictions at this point, and should call if there are any signs of sinusitis.              Again, thank you for allowing me to participate in the care of your patient.        Sincerely,        Sae Knight MD

## 2022-01-25 DIAGNOSIS — F32.1 CURRENT MODERATE EPISODE OF MAJOR DEPRESSIVE DISORDER, UNSPECIFIED WHETHER RECURRENT (H): Primary | ICD-10-CM

## 2022-01-25 RX ORDER — FLUOXETINE 40 MG/1
80 CAPSULE ORAL DAILY
Qty: 60 CAPSULE | Refills: 4 | Status: SHIPPED | OUTPATIENT
Start: 2022-01-25 | End: 2022-02-22

## 2022-02-21 DIAGNOSIS — F32.1 CURRENT MODERATE EPISODE OF MAJOR DEPRESSIVE DISORDER, UNSPECIFIED WHETHER RECURRENT (H): ICD-10-CM

## 2022-02-21 NOTE — TELEPHONE ENCOUNTER
"Request for medication refill: FLUoxetine (PROZAC) 40 MG capsule    Providers if patient needs an appointment and you are willing to give a one month supply please refill for one month and  send a letter/MyChart using \".SMILLIMITEDREFILL\" .smillimited and route chart to \"[facility] \" (Giving one month refill in non controlled medications is strongly recommended before denial)    If refill has been denied, meaning absolutely no refills without visit, please complete the smart phrase \".smirxrefuse\" and route it to the \"[facility] MED REFILLS\"  pool to inform the patient and the pharmacy.    Hannah Mojica        "

## 2022-02-22 RX ORDER — FLUOXETINE 40 MG/1
80 CAPSULE ORAL DAILY
Qty: 60 CAPSULE | Refills: 4 | Status: SHIPPED | OUTPATIENT
Start: 2022-02-22 | End: 2022-03-29

## 2022-03-29 ENCOUNTER — OFFICE VISIT (OUTPATIENT)
Dept: FAMILY MEDICINE | Facility: CLINIC | Age: 27
End: 2022-03-29
Payer: COMMERCIAL

## 2022-03-29 VITALS
WEIGHT: 192 LBS | BODY MASS INDEX: 29.1 KG/M2 | SYSTOLIC BLOOD PRESSURE: 115 MMHG | TEMPERATURE: 97.7 F | HEART RATE: 80 BPM | RESPIRATION RATE: 16 BRPM | HEIGHT: 68 IN | OXYGEN SATURATION: 96 % | DIASTOLIC BLOOD PRESSURE: 76 MMHG

## 2022-03-29 DIAGNOSIS — G56.21 ULNAR NEUROPATHY AT ELBOW, RIGHT: ICD-10-CM

## 2022-03-29 DIAGNOSIS — F64.0 GENDER DYSPHORIA IN ADULT: ICD-10-CM

## 2022-03-29 DIAGNOSIS — G89.29 CHRONIC RIGHT SHOULDER PAIN: Primary | ICD-10-CM

## 2022-03-29 DIAGNOSIS — M25.511 CHRONIC RIGHT SHOULDER PAIN: Primary | ICD-10-CM

## 2022-03-29 DIAGNOSIS — G89.29 CHRONIC PAIN OF RIGHT KNEE: ICD-10-CM

## 2022-03-29 DIAGNOSIS — F32.1 CURRENT MODERATE EPISODE OF MAJOR DEPRESSIVE DISORDER, UNSPECIFIED WHETHER RECURRENT (H): ICD-10-CM

## 2022-03-29 DIAGNOSIS — M25.561 CHRONIC PAIN OF RIGHT KNEE: ICD-10-CM

## 2022-03-29 PROCEDURE — 99214 OFFICE O/P EST MOD 30 MIN: CPT | Mod: GC | Performed by: STUDENT IN AN ORGANIZED HEALTH CARE EDUCATION/TRAINING PROGRAM

## 2022-03-29 RX ORDER — TESTOSTERONE CYPIONATE 200 MG/ML
60 INJECTION, SOLUTION INTRAMUSCULAR WEEKLY
Qty: 4 ML | Refills: 1 | Status: SHIPPED | OUTPATIENT
Start: 2022-03-29 | End: 2022-05-06

## 2022-03-29 RX ORDER — FLUOXETINE 40 MG/1
80 CAPSULE ORAL DAILY
Qty: 60 CAPSULE | Refills: 1 | Status: SHIPPED | OUTPATIENT
Start: 2022-03-29 | End: 2022-05-29

## 2022-03-29 NOTE — PROGRESS NOTES
Assessment & Plan     Luis was seen today for arthritis and refill request.    Diagnoses and all orders for this visit:    Chronic right shoulder pain  Patient presenting with chronic right shoulder pain without history of trauma or acute injury. Constant 3/10 aching pain that worsens when he sleeps with his arm up over his head. Improves somewhat with ibuprofen and massage. Exam notable for trapezius muscle spasm, biceps tendon tenderness, and slight decreased ROM with forward flexion compared to the left. Presentation consistent with biceps tendonitis and muscle spasm. Start ibuprofen 400mg q6-8 hours for 2 weeks, then decrease to prn. Start PT.  -     Physical Therapy Referral; Future    Chronic pain of right knee  Chronic right knee pain that is elicited with running. History of right knee injury from soccer injury in middle school from a lateral impact. No reported weakness, numbness, or tingling. No catching or giving out over the last year. Full ROM in the hip and knee. 4+/5 strength with right knee extension and flexion compared to the left. Exam otherwise normal. Most consistent with patellofemoral pain. Ibuprofen as above. Start PT.   -     Physical Therapy Referral; Future    Ulnar neuropathy at elbow, right  Presenting with right arm numbness and tingling that extends from the elbow down into the lateral hand after long periods of crocheting. Mild decrease in sensation on exam in lateral hand and 5th digit compared to left. Consistent with ulnar nerve compression at the elbow from repetitive motion. Start hand therapy. NSAIDs as above.  -     Occupational Therapy Referral; Future    Current moderate episode of major depressive disorder, unspecified whether recurrent (H)  - refill  -     FLUoxetine (PROZAC) 40 MG capsule; Take 2 capsules (80 mg) by mouth daily    Gender dysphoria in adult  - refill. Follow up in 2 months for repeat labs and visit  -     testosterone cypionate (DEPOTESTOSTERONE) 200  "MG/ML injection; Inject 0.3 mLs (60 mg) into the muscle once a week    Return in about 2 months (around 5/29/2022) for Follow up, with me for hormone therapy. labs about a week beforehand.     Jodi Lemon MD  [facility] EMILIANO Eptsein is a 26 year old  who presents for the following health issues     Patient presents with:  Arthritis: Pt. reports x 8 months joint pain comes and goes.  Refill Request: Prozac, testosterone    Joint Pain  - has always had \"not the best joints\"    right knee  - since middle school  - running hurts  - had a soccer injury lateral  - had to wear a brace for a few months  - has stopped running  - did ballroom dance in college - hurt knee  - hurts at the front outside  - popping with knee extension  - used to give out, but not recently    right shoulder  - thought shoulder issue was from sleeping on it weird  - worse over last year  - hurts when up, and back  - radiates down arm  - baseline 3-4/10  - has tried ibuprofen, ice, massage  - sleeps on right  - no injuries  - played soccer and volley in school when younger    Right forearm:  - has a lot of tightness after crocheting   - numbness and tingling after crocheting      - hyperflexible joints, don't feel stable  - joints catching and cracking  - knee and hip instability    FH:  - grandma had knee and hip replacements  - dad had pinched nerve in shoulder  - no known history of rheumatoid    Refills:  - testosterone  - prozac    Chart Review:  - last seen by me 11/24 - IUD removal  - s/p ENT septoplasty with turbinoplasty 12/16  - meds: eletriptan, fluoxetine, testosterone  - last T labs: 11/26    Review of Systems   Constitutional, HEENT, cardiovascular, pulmonary, GI, and  systems are negative, except as otherwise noted.      Objective    /76   Pulse 80   Temp 97.7  F (36.5  C) (Oral)   Resp 16   Ht 1.735 m (5' 8.31\")   Wt 87.1 kg (192 lb)   SpO2 96%   BMI 28.93 kg/m    Body mass " index is 28.93 kg/m .    Physical Exam   Physical Exam  Constitutional:       Appearance: Normal appearance.   HENT:      Head: Normocephalic.   Eyes:      Conjunctiva/sclera: Conjunctivae normal.   Cardiovascular:      Rate and Rhythm: Normal rate.   Pulmonary:      Effort: Pulmonary effort is normal.   Musculoskeletal:         General: No swelling.      Cervical back: No tenderness.      Thoracic back: No tenderness.      Lumbar back: No tenderness.   Skin:     General: Skin is warm and dry.   Neurological:      Mental Status: He is alert.   Psychiatric:         Mood and Affect: Mood normal.         Behavior: Behavior normal.         Thought Content: Thought content normal.         Judgment: Judgment normal.     Right Shoulder:  Palpation:  Non-tender SC joint, clavicle, AC joint, acromion, subacromial space. Tender proximal bicep tendon, and upper trapezius muscle with muscle spasm  Range of Motion        Active:full. Very slightly decreased forward flexion compared to left, but still able to get to ear        Passive: all normal  Strength: rotator cuff strength full  Special tests: Negative Neer's test, Negative Nguyen, Negative Cross-Arm adduction, Negative Sulcus Sign    Right Arm:  Inspection: no swelling  Tender: nontender  Range of Motion: all normal  Strength: elbow strength full  Biceps reflex intact  Sensation: slightly diminished at later palm and 5th digit compared to left    Right Hip:  Range of Motion:  Full ROM, both hips  Strength: 4+ right hip flexion compared to 5 on left    Right Knee:  Inspection:       AP/lateral alignment normal      Non-tender: patellar facets, MCL, LCL, lateral joint line, medial joint line, IT band, posterior knee   Active Range of Motion: all normal  Strength: 4+ right knee extension and flexion. 5/5 on left  Special tests: normal Valgus stress test, normal Varus, negative Lachman's test,negative posterior drawer  Brisk patellar reflexes bilaterally    Beighton  score:  0  ----- Service Performed and Documented by Resident  ------

## 2022-03-30 NOTE — PATIENT INSTRUCTIONS
Patient Education   Here is the plan from today's visit    You came in for joint pains:  Your right shoulder pain is likely from tendonitis. Start with PT and ibuprofen.  Your right knee pain may be from patellofemoral dysfunction. Start with PT and ibuprofen.  The numbness and tingling down your arm from crocheting is from what is called ulnar neuropathy. Start hand therapy.    For the ibuprofen, I'd recommend 400 mg every 6-8 hours for 2 weeks to help with the inflammation of your joints. After that, you can just take it as needed.    For your testosterone, I'd like to see you back in about 2 months. Please get your labs about a week before your appointment. You'll need to call to schedule a lab only appointment.    Please call or return to clinic if your symptoms don't go away.    Follow up plan  Return in about 2 months (around 5/29/2022) for Follow up, with me for hormone therapy. labs about a week beforehand.    Thank you for coming to [facility] today.  Lab Testing:  **If you had lab testing today and your results are reassuring or normal they will be mailed to you or sent through [facility] within 7 days.   **If the lab tests need quick action we will call you with the results.  **If you are having labs done on a different day, please call [phone] to schedule at [facility] or [phone] for other [facility] locations. Labs do not offer walk-in appointments.  The phone number we will call with results is # [phone] (home) . If this is not the best number please call our clinic and change the number.  Medication Refills:  If you need any refills please call your pharmacy and they will contact us.   If you need to  your refill at a new pharmacy, please contact the new pharmacy directly. The new pharmacy will help you get your medications transferred faster.   Scheduling:  If you have any concerns about today's visit or wish to schedule another appointment please call  our office during normal business hours [phone] (8-5:00 M-F)  If a referral was made to an [facility] specialty provider and you do not get a call from central scheduling, please refer to directions on your visit summary or call our office during normal business hours for assistance.   If a Mammogram was ordered for you at the [facility] call [phone] to schedule or change your appointment.  If you had an XRay/CT/Ultrasound/MRI ordered the number is [phone] to schedule or change your radiology appointment.   [facility] has limited ultrasound appointments available on Wednesdays, if you would like your ultrasound at [facility], please call [phone] to schedule.   Medical Concerns:  If you have urgent medical concerns please call [phone] at any time of the day.    Jodi Lemon MD

## 2022-04-08 ENCOUNTER — THERAPY VISIT (OUTPATIENT)
Dept: PHYSICAL THERAPY | Facility: CLINIC | Age: 27
End: 2022-04-08
Attending: STUDENT IN AN ORGANIZED HEALTH CARE EDUCATION/TRAINING PROGRAM
Payer: COMMERCIAL

## 2022-04-08 DIAGNOSIS — M25.561 CHRONIC PAIN OF RIGHT KNEE: ICD-10-CM

## 2022-04-08 DIAGNOSIS — G89.29 CHRONIC PAIN OF RIGHT KNEE: ICD-10-CM

## 2022-04-08 PROCEDURE — 97110 THERAPEUTIC EXERCISES: CPT | Mod: GP | Performed by: PHYSICAL THERAPIST

## 2022-04-08 PROCEDURE — 97530 THERAPEUTIC ACTIVITIES: CPT | Mod: GP | Performed by: PHYSICAL THERAPIST

## 2022-04-08 PROCEDURE — 97161 PT EVAL LOW COMPLEX 20 MIN: CPT | Mod: GP | Performed by: PHYSICAL THERAPIST

## 2022-04-08 ASSESSMENT — ACTIVITIES OF DAILY LIVING (ADL)
WALK: ACTIVITY IS NOT DIFFICULT
PAIN: THE SYMPTOM AFFECTS MY ACTIVITY SLIGHTLY
STAND: ACTIVITY IS NOT DIFFICULT
GO DOWN STAIRS: ACTIVITY IS MINIMALLY DIFFICULT
WEAKNESS: THE SYMPTOM AFFECTS MY ACTIVITY MODERATELY
SIT WITH YOUR KNEE BENT: ACTIVITY IS NOT DIFFICULT
LIMPING: I DO NOT HAVE THE SYMPTOM
RISE FROM A CHAIR: ACTIVITY IS NOT DIFFICULT
AS_A_RESULT_OF_YOUR_KNEE_INJURY,_HOW_WOULD_YOU_RATE_YOUR_CURRENT_LEVEL_OF_DAILY_ACTIVITY?: NEARLY NORMAL
RAW_SCORE: 58
HOW_WOULD_YOU_RATE_THE_OVERALL_FUNCTION_OF_YOUR_KNEE_DURING_YOUR_USUAL_DAILY_ACTIVITIES?: NEARLY NORMAL
SQUAT: ACTIVITY IS NOT DIFFICULT
SWELLING: I DO NOT HAVE THE SYMPTOM
GIVING WAY, BUCKLING OR SHIFTING OF KNEE: THE SYMPTOM AFFECTS MY ACTIVITY MODERATELY
KNEEL ON THE FRONT OF YOUR KNEE: ACTIVITY IS NOT DIFFICULT
KNEE_ACTIVITY_OF_DAILY_LIVING_SCORE: 82.86
KNEE_ACTIVITY_OF_DAILY_LIVING_SUM: 58
STIFFNESS: I DO NOT HAVE THE SYMPTOM
HOW_WOULD_YOU_RATE_THE_CURRENT_FUNCTION_OF_YOUR_KNEE_DURING_YOUR_USUAL_DAILY_ACTIVITIES_ON_A_SCALE_FROM_0_TO_100_WITH_100_BEING_YOUR_LEVEL_OF_KNEE_FUNCTION_PRIOR_TO_YOUR_INJURY_AND_0_BEING_THE_INABILITY_TO_PERFORM_ANY_OF_YOUR_USUAL_DAILY_ACTIVITIES?: 70
GO UP STAIRS: ACTIVITY IS FAIRLY DIFFICULT

## 2022-04-08 NOTE — LETTER
[facility] [address]  [facility] [address]  [phone]    2022    Re: Luis Epstein   :   1995  MRN:  [id number]   REFERRING PHYSICIAN:   Negro PRAJAPATI [facility]  Date of Initial Evaluation:  2022  Visits:  Rxs Used: 3 per therapist  Reason for Referral:  Chronic pain of right knee    Physical Therapy Initial Examination/Evaluation  2022  Luis Epstein is a 26 year old  adult referred to physical therapy by Dr. Negro Mercer for treatment of R knee pain.    DOI/onset 10-8-21  Mechanism of injury Patient has had a gradual onset of increased R knee pain over the past 6 months without incident.  DOS n/a  Prior treatment none.   Chief Complaint:   Knee pain with stairs.   Pain location: anterior/medial R knee  Quality: sharp  Constant/Intermittent: intermittent  Time of day: no time pattern  Symptoms have worsened some since onset.    Current pain 3/10.  Pain at best 1/10.  Pain at worst 5/10.    Symptoms aggravated by stairs and running.    Symptoms improved with sitting, walking.   Social history:  . Transgender female to male    Occupation: teacher.  Job duties:  Computer work, prolonged standing.    Patient having difficulty with ADLs: none.    Patient's goals are to reduce knee pain with stairs and resume running.  Patient reports general health as good.  Related medical history: R knee injury 10 years ago in soccer. The injury resolved on it's own    Surgical History:  None related to knee.    Imaging: `none.    Medications:  none.    Return to MD:  As needed.      Clinical Impression: Patient presents with sx's consistent with patellofemoral knee pain with significant hip weakness. Patient should respond well to continued PT intervention working to improve his R LE strength through therapeutic exercise.           Objective:  Standing Alignment:    Pelvic:  Normal  Knee:   Normal  Re: Luis Epstein   :   1995    Gait:  No limp noted on R  Gait Type:  Normal     Flexibility/Screens:   Positive screens:  Knee  Lower Extremity:  Normal         Knee Evaluation:  ROM:  Strength wnl knee: R hip flex 4-/5; abd 4-/5; ext 4-/5.  AROM  Hyperextension:  Left:  0    Right: 0  Extension:  Left: 3    Right:  4  Flexion: Left: 145    Right: 145  Strength:   Extension:  Right: 5/5   Pain:  Flexion:  Right: 5/5   Pain:    Quad Set Right: Good    Pain:  Ligament Testing:  Normal  Special Tests:   Right knee negative for the following special tests:  Meniscal  Palpation:    Right knee tenderness present at:  Medial Joint Line  Edema:  Normal  Mobility Testing:  Normal  Functional Testing:    Quad:    Single Leg Squat:  Left:      Right:       Moderate loss of control  Bilateral Leg Squat:          General Evaluation:  Lower Extremity Flexibility:  normal    Assessment/Plan:    Patient is a 26 year old adult with right side knee complaints.    Patient has the following significant findings with corresponding treatment plan.                Diagnosis 1:  R knee pain  Pain -  self management and education  Decreased strength - therapeutic exercise and therapeutic activities  Impaired muscle performance - neuro re-education  Decreased function - therapeutic activities    Therapy Evaluation Codes:   1) History comprised of:   Personal factors that impact the plan of care:      Time since onset of symptoms.    Comorbidity factors that impact the plan of care are:      None.     Medications impacting care: None.  2) Examination of Body Systems comprised of:   Body structures and functions that impact the plan of care:      Knee.  Re: Luis Epstein   :   1995     Activity limitations that impact the plan of care are:      Running and Stairs.  3) Clinical presentation characteristics are:   Stable/Uncomplicated.  4) Decision-Making    Low complexity using standardized patient assessment instrument  and/or measureable assessment of functional outcome.  Cumulative Therapy Evaluation is: Low complexity.    Previous and current functional limitations:  (See Goal Flow Sheet for this information)    Short term and Long term goals: (See Goal Flow Sheet for this information)     Communication ability:  Patient appears to be able to clearly communicate and understand verbal and written communication and follow directions correctly.  Treatment Explanation - The following has been discussed with the patient:   RX ordered/plan of care  Anticipated outcomes  Possible risks and side effects  This patient would benefit from PT intervention to resume normal activities.   Rehab potential is good.    Frequency:  2 X a month, once daily  Duration:  for 6 weeks  Discharge Plan:  Achieve all LTG.  Independent in home treatment program.  Reach maximal therapeutic benefit.      Thank you for your referral.    INQUIRIES  Therapist: Tana Williamson PT  [facility] [address] [address]  Phone: [phone]  Fax: [phone]

## 2022-04-09 PROBLEM — M25.561 CHRONIC PAIN OF RIGHT KNEE: Status: ACTIVE | Noted: 2022-04-09

## 2022-04-09 PROBLEM — G89.29 CHRONIC PAIN OF RIGHT KNEE: Status: ACTIVE | Noted: 2022-04-09

## 2022-04-09 NOTE — PROGRESS NOTES
Physical Therapy Initial Evaluation    Physical Therapy Initial Examination/Evaluation  April 8, 2022    Luis Epstein  is a 26 year old  adult referred to physical therapy by Dr. Negro Mercer for treatment of R knee pain.      DOI/onset 10-8-21  Mechanism of injury Patient has had a gradual onset of increased R knee pain over the past 6 months without incident.  DOS n/a  Prior treatment none.     Chief Complaint:   Knee pain with stairs.   Pain location: anterior/medial R knee  Quality: sharp  Constant/Intermittent: intermittent  Time of day: no time pattern  Symptoms have worsened some since onset.    Current pain 3/10.  Pain at best 1/10.  Pain at worst 5/10.    Symptoms aggravated by stairs and running.    Symptoms improved with sitting, walking.     Social history:  . Transgender female to male    Occupation: teacher.  Job duties:  Computer work, prolonged standing.    Patient having difficulty with ADLs: none.    Patient's goals are to reduce knee pain with stairs and resume running.    Patient reports general health as good.  Related medical history: R knee injury 10 years ago in soccer. The injury resolved on it's own    Surgical History:  None related to knee.    Imaging: `none.    Medications:  none.       Return to MD:  As needed.      Clinical Impression: Patient presents with sx's consistent with patellofemoral knee pain with significant hip weakness. Patient should respond well to continued PT intervention working to improve his R LE strength through therapeutic exercise.    Subjective:  HPI                    Objective:  Standing Alignment:          Pelvic:  Normal    Knee:  Normal      Gait:  No limp noted on R  Gait Type:  Normal         Flexibility/Screens:   Positive screens:  Knee        Lower Extremity:  Normal                                                    Knee Evaluation:  ROM:  Strength wnl knee: R hip flex 4-/5; abd 4-/5; ext 4-/5.  AROM    Hyperextension:  Left:  0    Right:  0  Extension:  Left: 3    Right:  4  Flexion: Left: 145    Right: 145        Strength:     Extension:  Right: 5/5   Pain:  Flexion:  Right: 5/5   Pain:      Quad Set Right: Good    Pain:  Ligament Testing:  Normal                Special Tests:       Right knee negative for the following special tests:  Meniscal  Palpation:      Right knee tenderness present at:  Medial Joint Line  Edema:  Normal    Mobility Testing:  Normal            Functional Testing:          Quad:    Single Leg Squat:  Left:      Right:       Moderate loss of control  Bilateral Leg Squat:                    General Evaluation:          Lower Extremity Flexibility:  normal                                                                             ROS    Assessment/Plan:    Patient is a 26 year old adult with right side knee complaints.    Patient has the following significant findings with corresponding treatment plan.                Diagnosis 1:  R knee pain  Pain -  self management and education  Decreased strength - therapeutic exercise and therapeutic activities  Impaired muscle performance - neuro re-education  Decreased function - therapeutic activities    Therapy Evaluation Codes:   1) History comprised of:   Personal factors that impact the plan of care:      Time since onset of symptoms.    Comorbidity factors that impact the plan of care are:      None.     Medications impacting care: None.  2) Examination of Body Systems comprised of:   Body structures and functions that impact the plan of care:      Knee.   Activity limitations that impact the plan of care are:      Running and Stairs.  3) Clinical presentation characteristics are:   Stable/Uncomplicated.  4) Decision-Making    Low complexity using standardized patient assessment instrument and/or measureable assessment of functional outcome.  Cumulative Therapy Evaluation is: Low complexity.    Previous and current functional limitations:  (See Goal Flow Sheet for this information)     Short term and Long term goals: (See Goal Flow Sheet for this information)     Communication ability:  Patient appears to be able to clearly communicate and understand verbal and written communication and follow directions correctly.  Treatment Explanation - The following has been discussed with the patient:   RX ordered/plan of care  Anticipated outcomes  Possible risks and side effects  This patient would benefit from PT intervention to resume normal activities.   Rehab potential is good.    Frequency:  2 X a month, once daily  Duration:  for 6 weeks  Discharge Plan:  Achieve all LTG.  Independent in home treatment program.  Reach maximal therapeutic benefit.    Please refer to the daily flowsheet for treatment today, total treatment time and time spent performing 1:1 timed codes.

## 2022-04-21 ENCOUNTER — THERAPY VISIT (OUTPATIENT)
Dept: PHYSICAL THERAPY | Facility: CLINIC | Age: 27
End: 2022-04-21
Payer: COMMERCIAL

## 2022-04-21 ENCOUNTER — LAB (OUTPATIENT)
Dept: LAB | Facility: CLINIC | Age: 27
End: 2022-04-21
Payer: COMMERCIAL

## 2022-04-21 DIAGNOSIS — M25.561 CHRONIC PAIN OF RIGHT KNEE: Primary | ICD-10-CM

## 2022-04-21 DIAGNOSIS — G89.29 CHRONIC PAIN OF RIGHT KNEE: Primary | ICD-10-CM

## 2022-04-21 DIAGNOSIS — Z51.81 ENCOUNTER FOR THERAPEUTIC DRUG MONITORING: ICD-10-CM

## 2022-04-21 DIAGNOSIS — F64.0 GENDER DYSPHORIA IN ADULT: ICD-10-CM

## 2022-04-21 LAB
ALT SERPL W P-5'-P-CCNC: 50 U/L (ref 0–70)
AST SERPL W P-5'-P-CCNC: 22 U/L (ref 0–45)
CHOLEST SERPL-MCNC: 273 MG/DL
FASTING STATUS PATIENT QL REPORTED: YES
FASTING STATUS PATIENT QL REPORTED: YES
GLUCOSE BLD-MCNC: 115 MG/DL (ref 70–99)
HDLC SERPL-MCNC: 50 MG/DL
HGB BLD-MCNC: 15.9 G/DL (ref 11.7–17.7)
LDLC SERPL CALC-MCNC: 181 MG/DL
NONHDLC SERPL-MCNC: 223 MG/DL
TRIGL SERPL-MCNC: 209 MG/DL

## 2022-04-21 PROCEDURE — 85018 HEMOGLOBIN: CPT

## 2022-04-21 PROCEDURE — 80061 LIPID PANEL: CPT

## 2022-04-21 PROCEDURE — 84450 TRANSFERASE (AST) (SGOT): CPT

## 2022-04-21 PROCEDURE — 84403 ASSAY OF TOTAL TESTOSTERONE: CPT

## 2022-04-21 PROCEDURE — 36415 COLL VENOUS BLD VENIPUNCTURE: CPT

## 2022-04-21 PROCEDURE — 84460 ALANINE AMINO (ALT) (SGPT): CPT

## 2022-04-21 PROCEDURE — 97530 THERAPEUTIC ACTIVITIES: CPT | Mod: GP | Performed by: PHYSICAL THERAPIST

## 2022-04-21 PROCEDURE — 82947 ASSAY GLUCOSE BLOOD QUANT: CPT

## 2022-04-21 PROCEDURE — 97110 THERAPEUTIC EXERCISES: CPT | Mod: GP | Performed by: PHYSICAL THERAPIST

## 2022-04-23 LAB — TESTOST SERPL-MCNC: 599 NG/DL (ref 8–950)

## 2022-04-27 DIAGNOSIS — F64.0 GENDER DYSPHORIA IN ADULT: Primary | ICD-10-CM

## 2022-05-05 ENCOUNTER — THERAPY VISIT (OUTPATIENT)
Dept: PHYSICAL THERAPY | Facility: CLINIC | Age: 27
End: 2022-05-05
Payer: COMMERCIAL

## 2022-05-05 DIAGNOSIS — G89.29 CHRONIC RIGHT SHOULDER PAIN: ICD-10-CM

## 2022-05-05 DIAGNOSIS — M25.511 CHRONIC RIGHT SHOULDER PAIN: ICD-10-CM

## 2022-05-05 PROBLEM — F64.0 GENDER DYSPHORIA IN ADULT: Status: ACTIVE | Noted: 2019-07-31

## 2022-05-05 PROBLEM — Z97.5 IUD (INTRAUTERINE DEVICE) IN PLACE: Status: RESOLVED | Noted: 2020-03-31 | Resolved: 2022-05-05

## 2022-05-05 PROCEDURE — 97110 THERAPEUTIC EXERCISES: CPT | Mod: GP | Performed by: PHYSICAL THERAPIST

## 2022-05-05 PROCEDURE — 97161 PT EVAL LOW COMPLEX 20 MIN: CPT | Mod: GP | Performed by: PHYSICAL THERAPIST

## 2022-05-05 PROCEDURE — 97530 THERAPEUTIC ACTIVITIES: CPT | Mod: GP | Performed by: PHYSICAL THERAPIST

## 2022-05-05 NOTE — PROGRESS NOTES
Assessment & Plan     Luis was seen today for refill request and uti.    Diagnoses and all orders for this visit:    Dysuria  Acute cystitis without hematuria  Patient having 10 days of dysuria, increased urinary frequency, urge, and small amounts of incontinence. Afebrile, no systemic symptoms, no CVA tenderness, so no concern for pyelonephritis. UA with small leuk esterase, negative nitrites. Treating empirically for symptomatic UTI.   -     UA reflex to Microscopic and Culture; Future  -     Urine Microscopic  -     Urine Culture  -     phenazopyridine (AZO URINARY PAIN RELIEF) 95 MG tablet; Take 2 tablets (190 mg) by mouth 3 times daily as needed (dysuria)  -     sulfamethoxazole-trimethoprim (BACTRIM DS) 800-160 MG tablet; Take 1 tablet by mouth 2 times daily for 3 days    Gender dysphoria in adult  On testosterone for 5-6 years; HRT going well. Recent labs notable for Total testosterone 599 within goal range of 300-1000. Hgb, glucose, LFTs wnl. Cholesterol, triglycerides, and LDL elevated, so will recheck when fasting next week (5/10). Recheck rest of labs in 6 months and follow up then.  -     testosterone cypionate (DEPOTESTOSTERONE) 200 MG/ML injection; Inject 0.3 mLs (60 mg) into the muscle once a week      Return in about 6 months (around 11/6/2022) for Follow up, with me for HRT.     Jodi Lemon MD  [facility]   Luis Epstein is a 26 year old  who presents for the following health issues     Patient presents with:  Refill Request: t  UTI: Symptoms of UTI--burning, frequency x 3days     UTI:  - for about a week and a half, increased frequency, dysuria, increased urgency, small amounts of incontinence  - no back pain, fever, abdominal pain  - no hematuria  - no vaginal itching, discharge    HRT  - needs refill on testosterone  - more acne lately, hasn't been washing face as much  - beard still coming in   - [facility] reached out and still need  "to get back in contact with them    ADHD:  - therapist had recommended workup for ADHD  - got diagnosed and started adderall few weeks ago. 10 mg once daily  - prescribed by ELIZABETH Strong      Chart Review:  - HRT: on testosterone cypionate injections 60 mg weekly   - last HRT labs 4/21: T 599, Hgb 15.9, glucose 115, AST/ALT wnl   - fasting lipid ordered  - last seen 3/29 for MSK pains - shoulder, knee    Review of Systems   Constitutional, HEENT, cardiovascular, pulmonary, GI, and  systems are negative, except as otherwise noted.      Objective    Pulse 69   Temp 97.7  F (36.5  C) (Oral)   Resp 16   Ht 1.74 m (5' 8.5\")   Wt 87.9 kg (193 lb 12.8 oz)   LMP  (LMP Unknown)   SpO2 96%   BMI 29.04 kg/m    Body mass index is 29.04 kg/m .    Physical Exam   Physical Exam  Constitutional:       Appearance: Normal appearance.   HENT:      Head: Normocephalic.   Eyes:      Conjunctiva/sclera: Conjunctivae normal.   Cardiovascular:      Rate and Rhythm: Normal rate and regular rhythm.      Heart sounds: Normal heart sounds.   Pulmonary:      Effort: Pulmonary effort is normal.      Breath sounds: Normal breath sounds.   Abdominal:      General: Abdomen is flat. Bowel sounds are normal. There is no distension.      Palpations: Abdomen is soft.      Tenderness: There is no abdominal tenderness. There is no right CVA tenderness, left CVA tenderness, guarding or rebound.   Musculoskeletal:         General: No swelling.   Skin:     General: Skin is warm and dry.   Neurological:      Mental Status: He is alert.   Psychiatric:         Mood and Affect: Mood normal.         Behavior: Behavior normal.         Thought Content: Thought content normal.         Judgment: Judgment normal.          ----- Service Performed and Documented by Resident  ------      "

## 2022-05-06 ENCOUNTER — OFFICE VISIT (OUTPATIENT)
Dept: FAMILY MEDICINE | Facility: CLINIC | Age: 27
End: 2022-05-06
Payer: COMMERCIAL

## 2022-05-06 VITALS
RESPIRATION RATE: 16 BRPM | HEART RATE: 69 BPM | TEMPERATURE: 97.7 F | WEIGHT: 193.8 LBS | HEIGHT: 69 IN | BODY MASS INDEX: 28.71 KG/M2 | OXYGEN SATURATION: 96 %

## 2022-05-06 DIAGNOSIS — F64.0 GENDER DYSPHORIA IN ADULT: ICD-10-CM

## 2022-05-06 DIAGNOSIS — N30.00 ACUTE CYSTITIS WITHOUT HEMATURIA: Primary | ICD-10-CM

## 2022-05-06 DIAGNOSIS — R30.0 DYSURIA: ICD-10-CM

## 2022-05-06 PROBLEM — F90.9 ADHD (ATTENTION DEFICIT HYPERACTIVITY DISORDER): Status: ACTIVE | Noted: 2022-05-06

## 2022-05-06 PROBLEM — M25.511 CHRONIC RIGHT SHOULDER PAIN: Status: ACTIVE | Noted: 2022-05-06

## 2022-05-06 PROBLEM — G89.29 CHRONIC RIGHT SHOULDER PAIN: Status: ACTIVE | Noted: 2022-05-06

## 2022-05-06 LAB
ALBUMIN UR-MCNC: ABNORMAL MG/DL
APPEARANCE UR: CLEAR
BACTERIA #/AREA URNS HPF: ABNORMAL /HPF
BILIRUB UR QL STRIP: NEGATIVE
COLOR UR AUTO: YELLOW
GLUCOSE UR STRIP-MCNC: NEGATIVE MG/DL
HGB UR QL STRIP: NEGATIVE
KETONES UR STRIP-MCNC: NEGATIVE MG/DL
LEUKOCYTE ESTERASE UR QL STRIP: ABNORMAL
MUCOUS THREADS #/AREA URNS LPF: PRESENT /LPF
NITRATE UR QL: NEGATIVE
PH UR STRIP: 7 [PH] (ref 5–8)
RBC #/AREA URNS AUTO: ABNORMAL /HPF
SP GR UR STRIP: 1.02 (ref 1–1.03)
SQUAMOUS #/AREA URNS AUTO: ABNORMAL /LPF
UROBILINOGEN UR STRIP-ACNC: 0.2 E.U./DL
WBC #/AREA URNS AUTO: ABNORMAL /HPF

## 2022-05-06 PROCEDURE — 87088 URINE BACTERIA CULTURE: CPT | Performed by: STUDENT IN AN ORGANIZED HEALTH CARE EDUCATION/TRAINING PROGRAM

## 2022-05-06 PROCEDURE — 99214 OFFICE O/P EST MOD 30 MIN: CPT | Mod: GC | Performed by: STUDENT IN AN ORGANIZED HEALTH CARE EDUCATION/TRAINING PROGRAM

## 2022-05-06 PROCEDURE — 87086 URINE CULTURE/COLONY COUNT: CPT | Performed by: STUDENT IN AN ORGANIZED HEALTH CARE EDUCATION/TRAINING PROGRAM

## 2022-05-06 PROCEDURE — 81001 URINALYSIS AUTO W/SCOPE: CPT | Performed by: STUDENT IN AN ORGANIZED HEALTH CARE EDUCATION/TRAINING PROGRAM

## 2022-05-06 RX ORDER — DEXTROAMPHETAMINE SACCHARATE, AMPHETAMINE ASPARTATE MONOHYDRATE, DEXTROAMPHETAMINE SULFATE AND AMPHETAMINE SULFATE 3.75; 3.75; 3.75; 3.75 MG/1; MG/1; MG/1; MG/1
15 CAPSULE, EXTENDED RELEASE ORAL DAILY
COMMUNITY

## 2022-05-06 RX ORDER — PHENAZOPYRIDINE HYDROCHLORIDE 95 MG/1
190 TABLET ORAL 3 TIMES DAILY PRN
Qty: 12 TABLET | Refills: 0 | Status: SHIPPED | OUTPATIENT
Start: 2022-05-06 | End: 2022-05-08

## 2022-05-06 RX ORDER — TESTOSTERONE CYPIONATE 200 MG/ML
60 INJECTION, SOLUTION INTRAMUSCULAR WEEKLY
Qty: 4 ML | Refills: 1 | Status: SHIPPED | OUTPATIENT
Start: 2022-05-06 | End: 2022-12-29

## 2022-05-06 RX ORDER — SULFAMETHOXAZOLE/TRIMETHOPRIM 800-160 MG
1 TABLET ORAL 2 TIMES DAILY
Qty: 6 TABLET | Refills: 0 | Status: SHIPPED | OUTPATIENT
Start: 2022-05-06 | End: 2022-05-09

## 2022-05-06 NOTE — PROGRESS NOTES
Physical Therapy Initial Evaluation    Physical Therapy Initial Examination/Evaluation  May 5, 2022    Luis Epstein  is a 26 year old  adult referred to physical therapy by Dr. Negro Mercer for treatment of R shoulder pain.      DOI/onset 11-5-21  Mechanism of injury Patient has had a gradual onset of R shoulder pain without incident over the past 6 months.  DOS n/a  Prior treatment none  Chief Complaint:   Limited overhead reaching/lifting due to pain.   Pain location: R GH joint  Quality: sharp  Constant/Intermittent: intermittent  Time of day: no time pattern  Symptoms have fluctuated since onset.    Current pain 3/10.  Pain at best 1/10.  Pain at worst 5/10.    Symptoms aggravated by overhead lifting and reaching.    Symptoms improved with rest.     Social history:  .    Occupation: teacher.  Job duties:  Repetitive tasks, prolonged standing, computer use.    Patient having difficulty with ADLs: none.    Patient's goals are to reduce pain.    Patient reports general health as good.  Related medical history no previous R shoulder problems.    Surgical History:  None related to shoulder.    Imaging: none.    Medications:  none.         Return to MD:  As needed.      Clinical Impression: Patient should respond well to continued PT intervention working on improving R shoulder ROM and strength as well as decreasing pain through therapeutic exercise.    Subjective:    Patient Health History  Luis Epstein being seen for Right shoulder.     Problem began: 11/5/2021.   Problem occurred: Unknown   Pain is reported as 4/10 on pain scale.  General health as reported by patient is fair and good.  Pertinent medical history includes: depression, migraines/headaches and pain at night/rest.     Medical allergies: none.   Surgeries include:  None.    Current medications:  Anti-depressants and hormone replacement therapy.    Current occupation is Teacher.   Primary job tasks include:  Computer work and prolonged sitting.                                     Objective:  Standing Alignment:      Shoulder/UE:  Normal                  Flexibility/Screens:   Positive screens:  Shoulder        Upper Extremity: normal                       Shoulder Evaluation:  ROM:  AROM:    Flexion:  Left:  175    Right:  145    Abduction:  Left: 175   Right:  145    Internal Rotation:  Left:  79    Right:  82  External Rotation:  Left:  85    Right:  85                      Strength:    Flexion: Right: 4/5     Pain:     Abduction:  Right: 4-/5     Pain:    Internal Rotation:  Right: 5/5     Pain:  External Rotation:   Right:4/5     Pain:    Horizontal Abduction:  Right:4-/5    Pain:        Stability Testing:  normal      Special Tests:  Special tests assessed shoulder: (+) Hawkin's Baldev R.    Right shoulder positive for the following special tests:Impingement  Right shoulder negative for the following special tests:Labral  Palpation:      Right shoulder tenderness present at: Supraspinatus and Infraspinatus  Mobility Tests:    Glenohumeral anterior right:  Hypermobile  Glenohumeral posterior right:  Hypermobile                                             General     ROS    Assessment/Plan:    Patient is a 26 year old adult with right side shoulder complaints.    Patient has the following significant findings with corresponding treatment plan.                Diagnosis 1:  R shoulder pain  Pain -  self management and education  Decreased ROM/flexibility - manual therapy and therapeutic exercise  Decreased strength - therapeutic exercise and therapeutic activities  Impaired muscle performance - neuro re-education  Decreased function - therapeutic activities    Therapy Evaluation Codes:   1) History comprised of:   Personal factors that impact the plan of care:      None.    Comorbidity factors that impact the plan of care are:      None.     Medications impacting care: None.  2) Examination of Body Systems comprised of:   Body structures and functions  that impact the plan of care:      Shoulder.   Activity limitations that impact the plan of care are:      Lifting.  3) Clinical presentation characteristics are:   Stable/Uncomplicated.  4) Decision-Making    Low complexity using standardized patient assessment instrument and/or measureable assessment of functional outcome.  Cumulative Therapy Evaluation is: Low complexity.    Previous and current functional limitations:  (See Goal Flow Sheet for this information)    Short term and Long term goals: (See Goal Flow Sheet for this information)     Communication ability:  Patient appears to be able to clearly communicate and understand verbal and written communication and follow directions correctly.  Treatment Explanation - The following has been discussed with the patient:   RX ordered/plan of care  Anticipated outcomes  Possible risks and side effects  This patient would benefit from PT intervention to resume normal activities.   Rehab potential is good.    Frequency:  2 X a month, once daily  Duration:  for 2 months  Discharge Plan:  Achieve all LTG.  Independent in home treatment program.  Reach maximal therapeutic benefit.    Please refer to the daily flowsheet for treatment today, total treatment time and time spent performing 1:1 timed codes.

## 2022-05-06 NOTE — PATIENT INSTRUCTIONS
Patient Education   Here is the plan from today's visit    Urinary symptoms:  - we are checking your urine to look for a bladder infection (urinary tract infection)  - we will reach out with the results  - call clinic if you develop fever, chills, or back pain    Gender Care:  - we refilled your testosterone   - keep the same dose, and we will recheck you levels in 6 months    High cholesterol  - we still need you to check your fasting level to see if you may need medications    Please call or return to clinic if your symptoms don't go away.    Follow up plan  Return in about 6 months (around 11/6/2022) for Follow up, with me for HRT.    Thank you for coming to [facility] today.  Lab Testing:  **If you had lab testing today and your results are reassuring or normal they will be mailed to you or sent through [email or web address] within 7 days.   **If the lab tests need quick action we will call you with the results.  **If you are having labs done on a different day, please call [phone] to schedule at [facility] or [phone] for other [facility] Outpatient Lab locations. Labs do not offer walk-in appointments.  The phone number we will call with results is # [phone] (home) . If this is not the best number please call our clinic and change the number.  Medication Refills:  If you need any refills please call your pharmacy and they will contact us.   If you need to  your refill at a new pharmacy, please contact the new pharmacy directly. The new pharmacy will help you get your medications transferred faster.   Scheduling:  If you have any concerns about today's visit or wish to schedule another appointment please call our office during normal business hours [phone] (8-5:00 M-F)  If a referral was made to an [facility] specialty provider and you do not get a call from central scheduling, please refer to directions on your visit summary or call our office during normal business hours for  assistance.   If a Mammogram was ordered for you at the [facility] call [phone] to schedule or change your appointment.  If you had an XRay/CT/Ultrasound/MRI ordered the number is [phone] to schedule or change your radiology appointment.   [facility] has limited ultrasound appointments available on Wednesdays, if you would like your ultrasound at [facility], please call [phone] to schedule.   Medical Concerns:  If you have urgent medical concerns please call [phone] at any time of the day.    Jodi Lemon MD

## 2022-05-08 LAB
BACTERIA UR CULT: ABNORMAL
BACTERIA UR CULT: ABNORMAL

## 2022-05-10 ENCOUNTER — LAB (OUTPATIENT)
Dept: LAB | Facility: CLINIC | Age: 27
End: 2022-05-10
Payer: COMMERCIAL

## 2022-05-10 DIAGNOSIS — F64.0 GENDER DYSPHORIA IN ADULT: ICD-10-CM

## 2022-05-10 LAB
CHOLEST SERPL-MCNC: 264 MG/DL
FASTING STATUS PATIENT QL REPORTED: YES
HDLC SERPL-MCNC: 47 MG/DL
LDLC SERPL CALC-MCNC: 171 MG/DL
NONHDLC SERPL-MCNC: 217 MG/DL
TRIGL SERPL-MCNC: 232 MG/DL

## 2022-05-10 PROCEDURE — 80061 LIPID PANEL: CPT

## 2022-05-10 PROCEDURE — 36415 COLL VENOUS BLD VENIPUNCTURE: CPT

## 2022-05-12 ENCOUNTER — TELEPHONE (OUTPATIENT)
Dept: FAMILY MEDICINE | Facility: CLINIC | Age: 27
End: 2022-05-12
Payer: COMMERCIAL

## 2022-05-12 DIAGNOSIS — G47.30 SLEEP-DISORDERED BREATHING: Primary | ICD-10-CM

## 2022-05-12 NOTE — TELEPHONE ENCOUNTER
Reason for Call: Request for an order or referral:    Order or referral being requested: Needs sleep study referral extended - expires 7/7/22    Date needed: as soon as possible    Has the patient been seen by the PCP for this problem? YES    Additional comments: n/a    Phone number Patient can be reached at:  Home number on file [phone] (home)    Best Time:  Anytime after 3pm    Can we leave a detailed message on this number?  YES    Call taken on 5/12/2022 at 3:19 PM by Irma Taylor

## 2022-05-19 ENCOUNTER — THERAPY VISIT (OUTPATIENT)
Dept: PHYSICAL THERAPY | Facility: CLINIC | Age: 27
End: 2022-05-19
Payer: COMMERCIAL

## 2022-05-19 DIAGNOSIS — G89.29 CHRONIC RIGHT SHOULDER PAIN: ICD-10-CM

## 2022-05-19 DIAGNOSIS — G89.29 CHRONIC PAIN OF RIGHT KNEE: Primary | ICD-10-CM

## 2022-05-19 DIAGNOSIS — M25.511 CHRONIC RIGHT SHOULDER PAIN: ICD-10-CM

## 2022-05-19 DIAGNOSIS — M25.561 CHRONIC PAIN OF RIGHT KNEE: Primary | ICD-10-CM

## 2022-05-19 PROCEDURE — 97530 THERAPEUTIC ACTIVITIES: CPT | Mod: GP | Performed by: PHYSICAL THERAPIST

## 2022-05-19 PROCEDURE — 97110 THERAPEUTIC EXERCISES: CPT | Mod: GP | Performed by: PHYSICAL THERAPIST

## 2022-05-19 NOTE — PROGRESS NOTES
Subjective:  HPI  Physical Exam                    Objective:  System    Physical Exam    General     ROS    Assessment/Plan:    SUBJECTIVE  Subjective changes as noted by pt:   Pt. is 20 min late for appt today. Pt. notes little to no pain with the knee the past few weeks. With the shoulder he reports decreasing pain but still has some difficulty with overhead reaching and lifting.     Current pain level:  (3/10 shoulder; 1/10 knee)   Changes in function:  Yes (See Goal flowsheet attached for changes in current functional level)     Adverse reaction to treatment or activity:  None    OBJECTIVE  Changes in objective findings:  AROM R sh flex 160; abd 150. Strength R sh flex 4+/5; abd 4+/5; ER 4+/5. R hip flex 5/5; abd 4+/5; ext 4+/5; ER 4+/5     ASSESSMENT  Luis continues to require intervention to meet STG and LTG's: PT  Patient's symptoms are resolving.  Response to therapy has shown an improvement in  pain level, ROM  and strength  Progress made towards STG/LTG?  Yes (See Goal flowsheet attached for updates on achievement of STG and LTG)    PLAN  Current treatment program is being advanced to more complex exercises.    PTA/ATC plan:  N/A    Please refer to the daily flowsheet for treatment today, total treatment time and time spent performing 1:1 timed codes.

## 2022-05-21 ENCOUNTER — HEALTH MAINTENANCE LETTER (OUTPATIENT)
Age: 27
End: 2022-05-21

## 2022-05-23 NOTE — PROGRESS NOTES
Preceptor Attestation:   Patient seen, evaluated and discussed with the resident. I have verified the content of the note, which accurately reflects my assessment of the patient and the plan of care.   Supervising Physician:  Salena White, DO

## 2022-06-06 ENCOUNTER — THERAPY VISIT (OUTPATIENT)
Dept: OCCUPATIONAL THERAPY | Facility: CLINIC | Age: 27
End: 2022-06-06
Attending: STUDENT IN AN ORGANIZED HEALTH CARE EDUCATION/TRAINING PROGRAM
Payer: COMMERCIAL

## 2022-06-06 DIAGNOSIS — G56.21 ULNAR NEUROPATHY AT ELBOW, RIGHT: ICD-10-CM

## 2022-06-06 DIAGNOSIS — G56.21 ULNAR NEUROPATHY OF RIGHT UPPER EXTREMITY: ICD-10-CM

## 2022-06-06 PROBLEM — G56.20 ULNAR NEUROPATHY: Status: ACTIVE | Noted: 2022-06-06

## 2022-06-06 PROCEDURE — 97110 THERAPEUTIC EXERCISES: CPT | Mod: GO | Performed by: OCCUPATIONAL THERAPIST

## 2022-06-06 PROCEDURE — 97165 OT EVAL LOW COMPLEX 30 MIN: CPT | Mod: GO | Performed by: OCCUPATIONAL THERAPIST

## 2022-06-06 PROCEDURE — 97112 NEUROMUSCULAR REEDUCATION: CPT | Mod: GO | Performed by: OCCUPATIONAL THERAPIST

## 2022-06-06 NOTE — PROGRESS NOTES
Hand Therapy Initial Evaluation    Current Date:  6/6/2022    Diagnosis: Right elbow neuropathy  DOI: February 2022    Precautions: none    Subjective:  Luis Epstein is a 26 year old adult.    Patient reports symptoms of the right fingers which occurred due to overuse. Since onset symptoms are Unchanged      Answers for HPI/ROS submitted by the patient on 5/31/2022  Reason for Visit:: Elbow and shoulder pain  When problem began:: 2/1/2022  How problem occurred:: Possibly crocheting, but my shoulders and elbows have been sensitive to injury forever  Number scale: 4/10  General health as reported by patient: good  Please check all that apply to your current or past medical history: chest pain, depression, migraines/headaches, pain at night/rest  Medical allergies: none  Surgeries: other  Other Surgery Detail: Sinus surgery, tonsillectomy, double mastectomy  Medications you are currently taking: anti-depressants, anti-inflammatory, hormone replacement therapy  Occupation:: Teacher  What are your primary job tasks: prolonged sitting, prolonged standing, repetitive tasks    Occupational Profile Information:  Right hand dominant  Prior functional level:  no limitations  Patient reports symptoms of pain, weakness/loss of strength, numbness and tingling   Special tests:  none.    Previous treatment: none  Barriers include:none  Mobility: No difficulty  Transportation: drives  Currently working in normal job without restrictions  Leisure activities/hobbies: crocheting, gardening, 2 big dogs, house projects  Other: laundry, cooking, yoga, weights, clarinet    Functional Outcome Measure:   Upper Extremity Functional Index Score:  SCORE:   Column Totals: /80: (P) 64   (A lower score indicates greater disability.)    Objective:  Pain Level (Scale 0-10)   6/6/2022   At Rest 2   With Use 2-5     Pain Description  Date 6/6/2022   Location wrist, long finger, ring finger and small finger   Pain Quality Burning and Shooting    Frequency constant     Pain is worst  daytime   Exacerbated by  use   Relieved by cold   Progression unchanged     Edema  Mild    Edema circ elbow 6/6/2022        R: 18.7 cm  L: 18.0 cm           Sensation  [place] Monofilament Sensory Testing:  Right hand 6/6/2022   Thumb 2.83   Index 2.83   Long 2.83   Ring RDN 2.83   Ring UDN 2.83   Small 2.83   1-65-2.83:  Normal  3.22-3.61:  Diminished light touch  3.84-4.31:  Diminished protective sensation  4.56-6.45:  Loss of protective sensation  6.65:  Deep pressure sensation  Absent:  Tested with no response      ROM   Full      Strength   (Measured in pounds)  Pain Report: - none  + mild    ++ moderate    +++ severe    6/6/2022 6/6/2022   Trials Left Right   1  2  3     Average 96# 85#     Lat Pinch 6/6/2022 6/6/2022   Trials left Right   1  2  3     Average 21# 25#     3 Pt Pinch 6/6/2022 6/6/2022   Trials Left Right   1  2  3     Average 22# 24#     Special Tests   - none    + mild    ++ moderate    +++ severe       6/6/2022   Iesha Test + Ring, small   Costoclavicular Test + Ring, small   Elbow Flexion Test + Ring, small   Tinels Cubital Tunnel + Ring, small   Tinels [place] -     Neural Tension Testing    UNT: Ulnar Neurodynamic Test (based on DS Stephanie's ULNT)   6/6/2022   0-5 Scale 1   Position:   0/5: Arm across abdomen in coronal plane  1/5: ER to neutral ABD shoulder to 45 degrees  2/5: ER shoulder to 90 degrees  3/5: Block shoulder and ABD shoulder to 110 degrees  4/5: Fully pronate forearm, extend wrist and ring and small fingers  5/5: Flex elbow and bring hand to side of face  Notes:    (+) indicates beyond grade level but less than skilled nursing to next level    (-) indicates over skilled nursing to level    S1  onset/change of patient's symptoms    S2 definite stop point based on patient's discomfort level        Palpation 6/6/2022       Pec major ++       Pec minor ++       Inner upper arm +       Pronator ++       Volar forearm ++              Assessment:  Patient presents with symptoms consistent with diagnosis of right upper extremity tightness and ulnar neuropathy,  with conservative intervention.     Patient's limitations or Problem List includes:  Pain, Weakness, Decreased , Decreased pinch and Tightness in musculature of the right upper extremity which interferes with the patient's ability to perform Self Care Tasks (dressing, eating, bathing, hygiene/toileting), Work Tasks, Sleep Patterns, Recreational Activities, Household Chores and Driving  as compared to previous level of function.    Rehab Potential:  Excellent - Return to full activity, no limitations    Patient will benefit from skilled Occupational Therapy to increase flexibility,  strength, pinch strength and sensation and decrease pain to return to previous activity level and resume normal daily tasks and to reach their rehab potential.    Barriers to Learning:  No barrier    Communication Issues:  Patient appears to be able to clearly communicate and understand verbal and written communication and follow directions correctly.    Chart Review: Simple history review with patient    Identified Performance Deficits: bathing/showering, toileting, dressing, hygiene and grooming, driving and community mobility, health management and maintenance, home establishment and management, meal preparation and cleanup, sleep, work and leisure activities    Assessment of Occupational Performance:  5 or more Performance Deficits    Clinical Decision Making (Complexity): Low complexity    Treatment Explanation:  The following has been discussed with the patient:  RX ordered/plan of care  Anticipated outcomes  Possible risks and side effects    Plan:  Frequency:  2 X a month, once daily  Duration:  for 2 months    Treatment Plan:   Therapeutic Exercise:  AROM and Tendon Gliding  Neuromuscular re-education:  Nerve Gliding, Posture and Kinesiotaping  Manual Techniques:  Myofascial release  Self  Care:  Ergonomic Considerations and Work Tasks    Discharge Plan:  Achieve all LTG.  Independent in home treatment program.  Reach maximal therapeutic benefit.    Home Exercise Program:  Exercise Name: Pectoralis Major Trigger Release, Sets: 1 - Sessions: 1x every other day, Notes: 60 sec hold on each knot  Exercise Name: Pectoralis Minor Trigger Release, Sets: 1 - Sessions: 1x every other day, Notes: Also roll to the side and do side muscles.  Exercise Name: Rolling upper back, Sets: 1 - Sessions: 1x every other day  Exercise Name: Spinal extensions, Sets: 1 - Reps: 3-5 reps at each vertebral section - Sessions: 1x every other day  Exercise Name: Latissimus/Teres/Serratus/Subscapularis Trigger Release, Sets: 1 - Sessions: 1x every other day, Notes: Make sure you go into tricep a bit too.  60 sec hold on each knot  Exercise Name: Foam Roller Stretch: Pectoralis Major, Sets: 1 - Sessions: 1x every other day, Notes: 1-5 min  Exercise Name: Foam Roller stretch: Pectoralis Minor - Sessions: 1x every other day, Notes: 1-5 minutes  Exercise Name: Self Elbow Mobilization, Trigger Point Massage Over Radial Head - Sessions: 1x every other day, Notes: Use golf ball to do pressure. Do inner and outer wad of forearm  Finger massage on the tendons in wrist  Exercise Name: Nerve Gliding Proximal Ulnar - Reps: 5-10 - Sessions: 2, Notes: No pain and no tingling.    Next Visit:  UE stretches  Lower trap strengthening  Ergonomic discussion

## 2022-06-08 NOTE — PROGRESS NOTES
Assessment & Plan     Luis was seen today for recheck and rash.    Diagnoses and all orders for this visit:    Mixed hyperlipidemia  Luis was diagnosed with hyperlipidemia at age 21, and has significant family history of HLD including dad (diagnosed in his 20s) and mom (diagnosed at age 40). No personal or FH of xanthomas. He has never been on a medication for HLD, and last fasting lipid panel 5/10/22 revealed elevated cholesterol to 264, TG of 232, HDL at 47, LDL at 171. 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 Will rule out secondary causes of T2DM and hypothyroidism with A1c and TSH. Last UA with just trace protein, so nephrotic syndrome unlikely. Will obtain BMP to evaluate kidney function. Referring to preventative cardiology. Counseled on risks of HLD including atherosclerotic disease and pancreatitis (for elevated TG). Discussed starting a statin, but will defer initiation until seen by cardiology. Handout on dietary recommendations given.  -     TSH with free T4 reflex; Future  -     Hemoglobin A1c; Future  -     Basic metabolic panel; Future  -     Adult Cardiology [facility] Referral; Future  -     TSH with free T4 reflex  -     Hemoglobin A1c  -     Basic metabolic panel    Chronic right shoulder pain  Has been having improvement in ROM and slight improvement in pain with PT. Interested in continuing PT. IF he reaches a point in which he is no longer improving, will plan for referral to Sports medicine.     Rash  Scattered erythematous rash on left arm after doing some landscaping this morning. Improved significantly since washing arm. Exam notable for scattered pinpoint lacerations. Counseled to continue to wash with soap and water, and call/return if any signs of infection.     Anxiety  Has worsening anxiety, which may be related to stimulant use (for ADHD) and work stress. SERGO-7 of 17 today. PHQ-9 of 8. On fluoxetine and sees his  therapist weekly. Follows with psych. He wants to wait to see how his stress and anxiety changes with being off of work this summer before making additional medication changes. Encouraged him to reach out if he needs additional support.     Encounter for immunization  -     HPV9 (Gardasil 9 )      Return in about 1 month (around 7/9/2022) for Follow up, with me, for Routine preventive.     Jodi Lemon MD  [facility] EMILIANO Epstein is a 26 year old  who presents for the following health issues     Patient presents with:  RECHECK: Cholesterol labs and results following up  Rechecking shoulder pain on right shoulder. Patient inform to be doing Physical therapy and it is going well as range of motion and strength has improved but pain is still the same.  Rash: X this morning when doing year work patient notice a rash forming on the left arm.     Cholesterol:  - at age 21, had routine testing, and cholesterol was high  - recommended diet changes  - was already a vegetarian  - has never been on a medication for cholesterol  - mom started statin at age 40  - dad started statin at age 26 or 28  - has a brother, but doesn't go to the doctor  - grandma having cardiac issues, but no other known issues  - no known xanthomas  - PGF had blood clot in brain      FH: hyperlipidemia in mom and dad  - family history of cardiac disease?  - family history of xanthomas?  - statin use?  - ASCVD based on last visit: 39% lifetime. Calculator won't do 10 year for age 26. 1.1% of age 40. (1.9% with highest labs)      Right shoulder:  - improved ROM, but still painful. Less constant  - got in to OT for hand    Anxiety around 3-4 pm  - when adderall wears off  - has messaged his psychiatrist  - has therapist 1x weekly    Right knee:  - doing great      Chart Review:  - elevated lipids: cholesterol consistently elevated (246-300)  - TG: increasing, now 232  - HDL 47  LDL elevated to 171 (elevated to  "220s)  - no recent TSH. Last creatinine 1.16 7/30/21. Last UA 5/6/22 with trace protein    Review of Systems   Constitutional, HEENT, cardiovascular, pulmonary, GI, and  systems are negative, except as otherwise noted.      Objective    /75   Pulse 72   Temp 98  F (36.7  C) (Oral)   Ht 1.727 m (5' 8\")   Wt 89.5 kg (197 lb 6.4 oz)   LMP  (LMP Unknown)   SpO2 96%   Breastfeeding No   BMI 30.01 kg/m    Body mass index is 30.01 kg/m .    Physical Exam   Physical Exam  Constitutional:       Appearance: Normal appearance.   HENT:      Head: Normocephalic.      Nose: Nose normal.   Eyes:      Conjunctiva/sclera: Conjunctivae normal.      Comments: No corneal arcus, no xanthelasmas   Cardiovascular:      Rate and Rhythm: Normal rate and regular rhythm.      Heart sounds: Normal heart sounds.   Pulmonary:      Effort: Pulmonary effort is normal.      Breath sounds: Normal breath sounds.   Musculoskeletal:         General: No swelling.   Skin:     General: Skin is warm and dry.      Comments: No xanthomas   Neurological:      Mental Status: He is alert.   Psychiatric:         Mood and Affect: Mood normal.         Behavior: Behavior normal.         Thought Content: Thought content normal.         Judgment: Judgment normal.          ----- Service Performed and Documented by Resident  ------      "

## 2022-06-09 ENCOUNTER — OFFICE VISIT (OUTPATIENT)
Dept: FAMILY MEDICINE | Facility: CLINIC | Age: 27
End: 2022-06-09
Payer: COMMERCIAL

## 2022-06-09 ENCOUNTER — THERAPY VISIT (OUTPATIENT)
Dept: PHYSICAL THERAPY | Facility: CLINIC | Age: 27
End: 2022-06-09
Payer: COMMERCIAL

## 2022-06-09 VITALS
SYSTOLIC BLOOD PRESSURE: 110 MMHG | BODY MASS INDEX: 29.92 KG/M2 | DIASTOLIC BLOOD PRESSURE: 75 MMHG | WEIGHT: 197.4 LBS | HEART RATE: 72 BPM | TEMPERATURE: 98 F | HEIGHT: 68 IN | OXYGEN SATURATION: 96 %

## 2022-06-09 DIAGNOSIS — G89.29 CHRONIC RIGHT SHOULDER PAIN: ICD-10-CM

## 2022-06-09 DIAGNOSIS — F41.9 ANXIETY: ICD-10-CM

## 2022-06-09 DIAGNOSIS — Z23 ENCOUNTER FOR IMMUNIZATION: ICD-10-CM

## 2022-06-09 DIAGNOSIS — M25.561 CHRONIC PAIN OF RIGHT KNEE: Primary | ICD-10-CM

## 2022-06-09 DIAGNOSIS — G89.29 CHRONIC PAIN OF RIGHT KNEE: Primary | ICD-10-CM

## 2022-06-09 DIAGNOSIS — R21 RASH: ICD-10-CM

## 2022-06-09 DIAGNOSIS — E78.2 MIXED HYPERLIPIDEMIA: Primary | ICD-10-CM

## 2022-06-09 DIAGNOSIS — M25.511 CHRONIC RIGHT SHOULDER PAIN: ICD-10-CM

## 2022-06-09 LAB
ANION GAP SERPL CALCULATED.3IONS-SCNC: 8 MMOL/L (ref 3–14)
BUN SERPL-MCNC: 17 MG/DL (ref 7–30)
CALCIUM SERPL-MCNC: 8.8 MG/DL (ref 8.5–10.1)
CHLORIDE BLD-SCNC: 109 MMOL/L (ref 94–109)
CO2 SERPL-SCNC: 26 MMOL/L (ref 20–32)
CREAT SERPL-MCNC: 1.18 MG/DL (ref 0.52–1.25)
GFR SERPL CREATININE-BSD FRML MDRD: 88 ML/MIN/1.73M2
GLUCOSE BLD-MCNC: 84 MG/DL (ref 70–99)
HBA1C MFR BLD: 5.4 % (ref 0–5.6)
POTASSIUM BLD-SCNC: 4 MMOL/L (ref 3.4–5.3)
SODIUM SERPL-SCNC: 143 MMOL/L (ref 133–144)
TSH SERPL DL<=0.005 MIU/L-ACNC: 1.4 MU/L (ref 0.4–4)

## 2022-06-09 PROCEDURE — 97530 THERAPEUTIC ACTIVITIES: CPT | Mod: GP | Performed by: PHYSICAL THERAPIST

## 2022-06-09 PROCEDURE — 99214 OFFICE O/P EST MOD 30 MIN: CPT | Mod: 25 | Performed by: STUDENT IN AN ORGANIZED HEALTH CARE EDUCATION/TRAINING PROGRAM

## 2022-06-09 PROCEDURE — 90651 9VHPV VACCINE 2/3 DOSE IM: CPT | Performed by: STUDENT IN AN ORGANIZED HEALTH CARE EDUCATION/TRAINING PROGRAM

## 2022-06-09 PROCEDURE — 97110 THERAPEUTIC EXERCISES: CPT | Mod: GP | Performed by: PHYSICAL THERAPIST

## 2022-06-09 PROCEDURE — 84443 ASSAY THYROID STIM HORMONE: CPT | Performed by: STUDENT IN AN ORGANIZED HEALTH CARE EDUCATION/TRAINING PROGRAM

## 2022-06-09 PROCEDURE — 80048 BASIC METABOLIC PNL TOTAL CA: CPT | Performed by: STUDENT IN AN ORGANIZED HEALTH CARE EDUCATION/TRAINING PROGRAM

## 2022-06-09 PROCEDURE — 36415 COLL VENOUS BLD VENIPUNCTURE: CPT | Performed by: STUDENT IN AN ORGANIZED HEALTH CARE EDUCATION/TRAINING PROGRAM

## 2022-06-09 PROCEDURE — 83036 HEMOGLOBIN GLYCOSYLATED A1C: CPT | Performed by: STUDENT IN AN ORGANIZED HEALTH CARE EDUCATION/TRAINING PROGRAM

## 2022-06-09 PROCEDURE — 90471 IMMUNIZATION ADMIN: CPT | Performed by: STUDENT IN AN ORGANIZED HEALTH CARE EDUCATION/TRAINING PROGRAM

## 2022-06-09 NOTE — PROGRESS NOTES
Subjective:  HPI  Physical Exam                    Objective:  System    Physical Exam    General     ROS    Assessment/Plan:    PROGRESS  REPORT    Progress reporting period is from 4-8-22 to 6-9-22.       SUBJECTIVE  Subjective changes noted by patient:   Pt. has made good progress with his 4knee with little to no pain in it anymore with daily activities. he has resumed his normal activities without much difficulty including running. With the R shoulder, pt. reports continued pain most days with certain movements and lifting. The pain is sporadic but present enough of the time to concern patient.      Current pain level is  (1/10 knee; 4/10 shoulder).     Previous pain level was  4/10.  Changes in function:  Yes (See Goal flowsheet attached for changes in current functional level)  Adverse reaction to treatment or activity: None    OBJECTIVE  Changes noted in objective findings:  AROM R sh flex 165; abd 172. Strength R sh flex 4+/5; abd 4+/5; ER 5/5; hor abd 4+/5. R hip 5/5; knee 5/5     ASSESSMENT/PLAN  Updated problem list and treatment plan: Diagnosis 1:  R knee pain  Pain -  self management and education  Decreased strength - therapeutic exercise and therapeutic activities  Impaired muscle performance - neuro re-education  Decreased function - therapeutic activities  Diagnosis 2:  R shoulder pain   Pain -  self management and education  Decreased ROM/flexibility - manual therapy and therapeutic exercise  Decreased strength - therapeutic exercise and therapeutic activities  Impaired muscle performance - neuro re-education  Decreased function - therapeutic activities  STG/LTGs have been met or progress has been made towards goals:  Yes (See Goal flow sheet completed today.)  Assessment of Progress: The patient's condition is improving.  Self Management Plans:  Patient has been instructed in a home treatment program.  Patient  has been instructed in self management of symptoms.  I have re-evaluated this patient  and find that the nature, scope, duration and intensity of the therapy is appropriate for the medical condition of the patient.  Luis continues to require the following intervention to meet STG and LTG's:  PT    Recommendations:  This patient would benefit from continued therapy.     Frequency:  2 X a month, once daily  Duration:  for 1 month for R shoulder. No further PT visits required for the R knee at this time.        Please refer to the daily flowsheet for treatment today, total treatment time and time spent performing 1:1 timed codes.

## 2022-06-09 NOTE — PROGRESS NOTES
Preceptor Attestation:    I discussed the patient with the resident and evaluated the patient in person. I have verified the content of the note, which accurately reflects my assessment of the patient and the plan of care. Preventative cardiology for familiar hyperlipidemia.    Supervising Physician:  Max Crane MD.

## 2022-06-10 NOTE — PATIENT INSTRUCTIONS
Patient Education   Here is the plan from today's visit    1. Mixed hyperlipidemia  You have high cholesterol and high triglycerides. You most likely have a genetic cause, but we will also rule out other causes like thyroid problems and diabetes. We rule these out with blood tests. We talked about starting a medication to lower your cholesterol and triglycerides (called a statin). We will wait to start this until after you see the cardiologist.    2. Chronic right shoulder pain  I'm glad you are having some improvement in your shoulder! IF you get to a point when you aren't seeing anymore improvement, I can send a referral to sports medicine.     3. Rash  - continue to wash your rash with soap and water  - watch out for signs of infection: worsening pain, redness, pus, or hot to the touch. Call to set up a appointment if you have any concerns for infection.     4. Encounter for immunization  You got your HPV vaccine!  - HPV9 (Gardasil 9 )    5. Anxiety  - keep working with your psychiatrist and therapist to help your anxiety. Reach out if you need any additional support from me.    Thanks for consenting to having our visit recorded!     Please call or return to clinic if your symptoms don't go away.    Follow up plan  Return in about 1 month (around 7/9/2022) for Follow up, with me, for Routine preventive.    Thank you for coming to [facility] today.  Lab Testing:  **If you had lab testing today and your results are reassuring or normal they will be mailed to you or sent through [employer] within 7 days.   **If the lab tests need quick action we will call you with the results.  **If you are having labs done on a different day, please call [phone] to schedule at [facility] or [phone] for other [facility] locations. Labs do not offer walk-in appointments.  The phone number we will call with results is # [phone] (home) . If this is not the best number please call our clinic and  change the number.  Medication Refills:  If you need any refills please call your pharmacy and they will contact us.   If you need to  your refill at a new pharmacy, please contact the new pharmacy directly. The new pharmacy will help you get your medications transferred faster.   Scheduling:  If you have any concerns about today's visit or wish to schedule another appointment please call our office during normal business hours [phone] (8-5:00 M-F)  If a referral was made to an [facility] specialty provider and you do not get a call from central scheduling, please refer to directions on your visit summary or call our office during normal business hours for assistance.   If a Mammogram was ordered for you at the [facility] call [phone] to schedule or change your appointment.  If you had an XRay/CT/Ultrasound/MRI ordered the number is [phone] to schedule or change your radiology appointment.   [facility] has limited ultrasound appointments available on Wednesdays, if you would like your ultrasound at [facility], please call [phone] to schedule.   Medical Concerns:  If you have urgent medical concerns please call [phone] at any time of the day.    Jodi Lemon MD

## 2022-06-14 NOTE — PROGRESS NOTES
SOAP Note Objective Information for 6/20/2022:    Pain Level (Scale 0-10)   6/6/2022 6/20/22   At Rest 2 0-2   With Use 2-5 2-3     Pain Description  Date 6/6/2022   Location wrist, long finger, ring finger and small finger   Pain Quality Burning and Shooting   Frequency constant     Pain is worst  daytime   Exacerbated by  use   Relieved by cold   Progression unchanged     Home Exercise Program:  Exercise Name: Pectoralis Major Trigger Release, Sets: 1 - Sessions: 1x every other day, Notes: 60 sec hold on each knot  Exercise Name: Pectoralis Minor Trigger Release, Sets: 1 - Sessions: 1x every other day, Notes: Also roll to the side and do side muscles.  Exercise Name: Rolling upper back, Sets: 1 - Sessions: 1x every other day  Exercise Name: Spinal extensions, Sets: 1 - Reps: 3-5 reps at each vertebral section - Sessions: 1x every other day  Exercise Name: Latissimus/Teres/Serratus/Subscapularis Trigger Release, Sets: 1 - Sessions: 1x every other day, Notes: Make sure you go into tricep a bit too.  60 sec hold on each knot  Exercise Name: Foam Roller Stretch: Pectoralis Major, Sets: 1 - Sessions: 1x every other day, Notes: 1-5 min  Exercise Name: Foam Roller stretch: Pectoralis Minor - Sessions: 1x every other day, Notes: 1-5 minutes  Exercise Name: Self Elbow Mobilization, Trigger Point Massage Over Radial Head - Sessions: 1x every other day, Notes: Use golf ball to do pressure. Do inner and outer wad of forearm  Finger massage on the tendons in wrist  Exercise Name: Nerve Gliding Proximal Ulnar - Reps: 5-10 - Sessions: 2, Notes: No pain and no tingling.  6/20/22  STM to FA flexors with golf ball  FA flexor stretch    Next Visit:  Check response to STM and FA flexor stretches  Consider elbow flexion blocking wrap for night  K-tape trial to cubital tunnel?  Lower trap strengthening  Ergonomic discussion

## 2022-06-20 ENCOUNTER — THERAPY VISIT (OUTPATIENT)
Dept: OCCUPATIONAL THERAPY | Facility: CLINIC | Age: 27
End: 2022-06-20
Payer: COMMERCIAL

## 2022-06-20 DIAGNOSIS — G56.21 ULNAR NEUROPATHY AT ELBOW, RIGHT: Primary | ICD-10-CM

## 2022-06-20 PROCEDURE — 97035 APP MDLTY 1+ULTRASOUND EA 15: CPT | Mod: GO | Performed by: OCCUPATIONAL THERAPIST

## 2022-06-20 PROCEDURE — 97110 THERAPEUTIC EXERCISES: CPT | Mod: GO | Performed by: OCCUPATIONAL THERAPIST

## 2022-06-20 PROCEDURE — 97140 MANUAL THERAPY 1/> REGIONS: CPT | Mod: GO | Performed by: OCCUPATIONAL THERAPIST

## 2022-06-23 ASSESSMENT — ENCOUNTER SYMPTOMS
FREQUENCY: 0
DIZZINESS: 0
HEADACHES: 1
BREAST MASS: 0
SORE THROAT: 0
PARESTHESIAS: 0
DIARRHEA: 0
ARTHRALGIAS: 1
DYSURIA: 0
HEARTBURN: 0
NERVOUS/ANXIOUS: 0
WEAKNESS: 1
COUGH: 0
HEMATOCHEZIA: 0
FEVER: 0
ABDOMINAL PAIN: 0
PALPITATIONS: 0
CHILLS: 0
SHORTNESS OF BREATH: 0
JOINT SWELLING: 1
HEMATURIA: 0
EYE PAIN: 0
MYALGIAS: 1
CONSTIPATION: 0
NAUSEA: 0

## 2022-06-24 ENCOUNTER — OFFICE VISIT (OUTPATIENT)
Dept: FAMILY MEDICINE | Facility: CLINIC | Age: 27
End: 2022-06-24
Payer: COMMERCIAL

## 2022-06-24 VITALS
WEIGHT: 196.6 LBS | SYSTOLIC BLOOD PRESSURE: 127 MMHG | OXYGEN SATURATION: 98 % | HEIGHT: 68 IN | BODY MASS INDEX: 29.8 KG/M2 | HEART RATE: 72 BPM | TEMPERATURE: 98.4 F | DIASTOLIC BLOOD PRESSURE: 85 MMHG

## 2022-06-24 DIAGNOSIS — G43.909 MIGRAINE WITHOUT STATUS MIGRAINOSUS, NOT INTRACTABLE, UNSPECIFIED MIGRAINE TYPE: ICD-10-CM

## 2022-06-24 DIAGNOSIS — M25.511 CHRONIC RIGHT SHOULDER PAIN: Primary | ICD-10-CM

## 2022-06-24 DIAGNOSIS — M24.9 HYPERMOBILE JOINTS: ICD-10-CM

## 2022-06-24 DIAGNOSIS — G56.21 ULNAR NEUROPATHY AT ELBOW, RIGHT: ICD-10-CM

## 2022-06-24 DIAGNOSIS — G89.29 CHRONIC RIGHT SHOULDER PAIN: Primary | ICD-10-CM

## 2022-06-24 PROCEDURE — 99214 OFFICE O/P EST MOD 30 MIN: CPT | Mod: GC | Performed by: STUDENT IN AN ORGANIZED HEALTH CARE EDUCATION/TRAINING PROGRAM

## 2022-06-24 RX ORDER — PROPRANOLOL HYDROCHLORIDE 20 MG/1
20 TABLET ORAL 2 TIMES DAILY
Qty: 60 TABLET | Refills: 3 | Status: SHIPPED | OUTPATIENT
Start: 2022-06-24 | End: 2022-09-22

## 2022-06-24 RX ORDER — ELETRIPTAN HYDROBROMIDE 20 MG/1
20 TABLET, FILM COATED ORAL
Qty: 40 TABLET | Refills: 3 | Status: SHIPPED | OUTPATIENT
Start: 2022-06-24 | End: 2023-10-23

## 2022-06-24 NOTE — PROGRESS NOTES
Assessment & Plan     Luis was seen today for physical, arthritis and headache.    Diagnoses and all orders for this visit:    Migraine without aura without status migrainosus, not intractable  Intermittent migraine, not meeting criteria for chronic (as he typically has 1-2 migraines per month), although has a prolonged migraine for 1-2 weeks twice a year. Had prolonged migraine last week, taking triptan 6/7 days. Patient requesting to establish with neurology. Start trial of propranolol for migraine prevention and continue eletriptan for abortive therapy.  -     Adult Neurology  Referral; Future  -     eletriptan (RELPAX) 20 MG tablet; Take 1 tablet (20 mg) by mouth at onset of headache for migraine May repeat in 2 hours. Max 4 tablets/24 hours.  -     propranolol (INDERAL) 20 MG tablet; Take 1 tablet (20 mg) by mouth 2 times daily    Chronic right shoulder pain  Has chronic right shoulder pain which has only had slight improvement with PT. Still having persistent pain when raising arm above shoulder. Will refer to sports med while continuing PT.  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 No family history of Marcus Danlos, although reports dad has somewhat increased joint mobility. Low concern for EDS based on Beighton score of 0 from visit 3/29/22 and lack of FH.   - refer to Dr. Evie Robert     Ulnar neuropathy at elbow, right  Having improvement in right arm ulnar-side tingling, pain, and numbness with hand therapy.  - continue hand therapy      Return if symptoms worsen or fail to improve, for Follow up, with me.     Jodi Lemon MD  [facility]    Subjective   Luis Epstein is a 26 year old  who presents for the following health issues    Patient presents with:  Physical: Physical check up  Arthritis: Joint pain since last summer but got worse on February. Pt has been doing  physical therapy and has somewhat helped but not a lot. Ibuprofen for two weeks but it did not help. Over the counter muscle relaxer creams helps for a bit but the pain comes back  Headache: Pt is taking relpax medication for migraines but seems to not be working this past week due to weather and would like to discuss referral for preventative measures    Joint pains:  - PT and OT are helpful, but pain comes back  - OT said muscles are tight despite hypermobile joints.   - joints that are most mobile are the most painful - elbows, shoulders  - wondering about something larger going on   - wondering about will dahnlos  - PT - for right knee and right shoulder, OT for right forearm  - knee: PT has been very effective for knee. Feels stronger, more stable, so not as painful  - right hand: numbness and tingling are getting better  - shoulder and arm pain aren't improving as much  - no FH of joint hypermobility  - right shoulder - hurting with raising arm about the shoulder. Constant pain  - icing daily - helps  - 2 weeks of daily ibuprofen - didn't seem to make a difference.   - ulnar nerve neuropathy    Migraines:  - takes elitriptan. Took 6 days last week. Usually takes 1-2x monthly  - thinks he was on nortriptyline before. Didn't work for headaches  - sometimes takes ibuprofen  - referral to neurology for headaches  - triggers: summer, weather  - interested in a prophylactic treatments  - about twice a year will have a bad stretch of headaches  - hurts behind eye (will switch which one)      Review of Systems   Constitutional: Negative for chills and fever.   HENT: Negative for congestion, ear pain, hearing loss and sore throat.    Eyes: Negative for pain and visual disturbance.   Respiratory: Negative for cough and shortness of breath.    Cardiovascular: Negative for chest pain and palpitations.   Gastrointestinal: Negative for abdominal pain, constipation, diarrhea and nausea.   Genitourinary: Negative for  "dysuria, frequency, genital sores, hematuria and urgency.   Musculoskeletal: Positive for arthralgias, arthritis, joint swelling and myalgias.   Skin: Negative for rash.   Neurological: Positive for weakness and headaches. Negative for dizziness.   Psychiatric/Behavioral: The patient is not nervous/anxious.      Answers for HPI/ROS submitted by the patient on 6/23/2022  Frequency of exercise:: 2-3 days/week  Getting at least 3 servings of Calcium per day:: Yes  Diet:: Regular (no restrictions)  Taking medications regularly:: Yes  Bi-annual eye exam:: Yes  Dental care twice a year:: Yes  Sleep apnea or symptoms of sleep apnea:: Daytime drowsiness  abdominal pain: No  Blood in stool: No  Blood in urine: No  chest pain: No  chills: No  congestion: No  constipation: No  cough: No  diarrhea: No  dizziness: No  ear pain: No  eye pain: No  nervous/anxious: No  fever: No  frequency: No  genital sores: No  headaches: Yes  hearing loss: No  heartburn: No  arthralgias: Yes  joint swelling: Yes  peripheral edema: No  mood changes: No  myalgias: Yes  nausea: No  dysuria: No  palpitations: No  Skin sensation changes: No  sore throat: No  urgency: No  rash: No  shortness of breath: No  visual disturbance: No  weakness: Yes  pelvic pain: No  vaginal bleeding: No  vaginal discharge: No  tenderness: No  breast mass: No  breast discharge: No  impotence: No  penile discharge: No  Duration of exercise:: 15-30 minutes      Objective    /85   Pulse 72   Temp 98.4  F (36.9  C) (Oral)   Ht 1.735 m (5' 8.31\")   Wt 89.2 kg (196 lb 9.6 oz)   LMP  (LMP Unknown)   SpO2 98%   BMI 29.62 kg/m    Body mass index is 29.62 kg/m .    Physical Exam   Physical Exam  Constitutional:       Appearance: Normal appearance.   HENT:      Head: Normocephalic.      Nose: No congestion or rhinorrhea.   Eyes:      Conjunctiva/sclera: Conjunctivae normal.   Cardiovascular:      Rate and Rhythm: Normal rate and regular rhythm.      Heart sounds: Normal " heart sounds.   Pulmonary:      Effort: Pulmonary effort is normal.      Breath sounds: Normal breath sounds.   Musculoskeletal:         General: No swelling.   Skin:     General: Skin is warm and dry.   Neurological:      Mental Status: He is alert.   Psychiatric:         Mood and Affect: Mood normal.         Behavior: Behavior normal.         Thought Content: Thought content normal.         Judgment: Judgment normal.        ----- Service Performed and Documented by Resident  ------

## 2022-06-24 NOTE — PROGRESS NOTES
Preceptor Attestation:    I discussed the patient with the resident and evaluated the patient in person. I have verified the content of the note, which accurately reflects my assessment of the patient and the plan of care.   Supervising Physician:  Ivonne Pina MD.

## 2022-06-24 NOTE — PATIENT INSTRUCTIONS
Patient Education   Here is the plan from today's visit    For your joint hypermobility:  EDS doctor  Dr. Evie Xiong  Address: [address] [place] [address] [address], [place] [address]  Phone: [phone]    Migraine:  - I referred you to the neurology headache clinic. You should get a phone call to schedule  - I refilled your eletriptan  - start propranolol to help prevent headaches from starting    Right shoulder and arm pain:  - Sports Medicine Clinic - [facility] Internal Referral; Future      Please call or return to clinic if your symptoms don't go away.    Follow up plan  Return if symptoms worsen or fail to improve, for Follow up, with me.    Thank you for coming to [facility] today.  Lab Testing:  **If you had lab testing today and your results are reassuring or normal they will be mailed to you or sent through [employer] within 7 days.   **If the lab tests need quick action we will call you with the results.  **If you are having labs done on a different day, please call [phone] to schedule at [facility] or [phone] for other [facility] locations. Labs do not offer walk-in appointments.  The phone number we will call with results is # [phone] (home) . If this is not the best number please call our clinic and change the number.  Medication Refills:  If you need any refills please call your pharmacy and they will contact us.   If you need to  your refill at a new pharmacy, please contact the new pharmacy directly. The new pharmacy will help you get your medications transferred faster.   Scheduling:  If you have any concerns about today's visit or wish to schedule another appointment please call our office during normal business hours [phone] (8-5:00 M-F)  If a referral was made to an [facility] specialty provider and you do not get a call from central scheduling, please refer to directions on your visit summary or call our office during  normal business hours for assistance.   If a Mammogram was ordered for you at the Breast Center call [phone] to schedule or change your appointment.  If you had an XRay/CT/Ultrasound/MRI ordered the number is [phone] to schedule or change your radiology appointment.   [facility] has limited ultrasound appointments available on Wednesdays, if you would like your ultrasound at [facility], please call [phone] to schedule.   Medical Concerns:  If you have urgent medical concerns please call [phone] at any time of the day.    Jodi Lemon MD

## 2022-06-27 ENCOUNTER — MYC MEDICAL ADVICE (OUTPATIENT)
Dept: FAMILY MEDICINE | Facility: CLINIC | Age: 27
End: 2022-06-27

## 2022-06-27 DIAGNOSIS — M25.20 JOINT LAXITY: Primary | ICD-10-CM

## 2022-06-30 NOTE — PROGRESS NOTES
SOAP Note Objective Information for 7/7/2022:    Pain Level (Scale 0-10)   6/6/2022 6/20/22 7/7/22   At Rest 2 0-2 2 left  0.5 right   With Use 2-5 2-3 7 left   3 right     Pain Description  Date 6/6/2022   Location wrist, long finger, ring finger and small finger   Pain Quality Burning and Shooting   Frequency constant     Pain is worst  daytime   Exacerbated by  use   Relieved by cold   Progression unchanged     Home Exercise Program:  Exercise Name: Pectoralis Major Trigger Release, Sets: 1 - Sessions: 1x every other day, Notes: 60 sec hold on each knot  Exercise Name: Pectoralis Minor Trigger Release, Sets: 1 - Sessions: 1x every other day, Notes: Also roll to the side and do side muscles.  Exercise Name: Rolling upper back, Sets: 1 - Sessions: 1x every other day  Exercise Name: Spinal extensions, Sets: 1 - Reps: 3-5 reps at each vertebral section - Sessions: 1x every other day  Exercise Name: Latissimus/Teres/Serratus/Subscapularis Trigger Release, Sets: 1 - Sessions: 1x every other day, Notes: Make sure you go into tricep a bit too.  60 sec hold on each knot  Exercise Name: Foam Roller Stretch: Pectoralis Major, Sets: 1 - Sessions: 1x every other day, Notes: 1-5 min  Exercise Name: Foam Roller stretch: Pectoralis Minor - Sessions: 1x every other day, Notes: 1-5 minutes  Exercise Name: Self Elbow Mobilization, Trigger Point Massage Over Radial Head - Sessions: 1x every other day, Notes: Use golf ball to do pressure. Do inner and outer wad of forearm  Finger massage on the tendons in wrist  Exercise Name: Nerve Gliding Proximal Ulnar - Reps: 5-10 - Sessions: 2, Notes: No pain and no tingling.  6/20/22  STM to FA flexors with golf ball  FA flexor stretch  7/7/22  K-tape trial - bilateral MEP/Flexor wad  Proximal strengthening exercises 3 x week:  Supine Ceiling Punch  Shoulder scaption full can  Prone Scapula    Next Visit:  Check response to proximal strengthening and K-tape to B Elbows  See if patient able to  schedule with MD re: EDS  US  STM  Stretches  UN glides  Ergonomic discussion    Please refer to the daily flowsheet for treatment today, total treatment time and time spent performing 1:1 timed codes.

## 2022-07-06 NOTE — TELEPHONE ENCOUNTER
Faxed referral per patient request:  Evie Robert at the [facility] in [place] (to  talk about Marcus-Danlos Syndrome), and was told I needed a referral.       Is there any way you can do that? They gave me the fax number: [phone].       Elisabeth Joyner  Care Coordinator  [facility]  Phone:[phone]

## 2022-07-07 ENCOUNTER — THERAPY VISIT (OUTPATIENT)
Dept: OCCUPATIONAL THERAPY | Facility: CLINIC | Age: 27
End: 2022-07-07
Payer: COMMERCIAL

## 2022-07-07 DIAGNOSIS — G56.21 ULNAR NEUROPATHY AT ELBOW, RIGHT: Primary | ICD-10-CM

## 2022-07-07 PROCEDURE — 97112 NEUROMUSCULAR REEDUCATION: CPT | Mod: GO | Performed by: OCCUPATIONAL THERAPIST

## 2022-07-07 PROCEDURE — 97140 MANUAL THERAPY 1/> REGIONS: CPT | Mod: GO | Performed by: OCCUPATIONAL THERAPIST

## 2022-07-07 PROCEDURE — 97110 THERAPEUTIC EXERCISES: CPT | Mod: GO | Performed by: OCCUPATIONAL THERAPIST

## 2022-07-08 ENCOUNTER — OFFICE VISIT (OUTPATIENT)
Dept: CARDIOLOGY | Facility: CLINIC | Age: 27
End: 2022-07-08
Attending: FAMILY MEDICINE
Payer: COMMERCIAL

## 2022-07-08 VITALS
OXYGEN SATURATION: 97 % | HEIGHT: 68 IN | DIASTOLIC BLOOD PRESSURE: 86 MMHG | SYSTOLIC BLOOD PRESSURE: 128 MMHG | WEIGHT: 198 LBS | HEART RATE: 63 BPM | BODY MASS INDEX: 30.01 KG/M2

## 2022-07-08 DIAGNOSIS — Z83.42 FAMILY HISTORY OF HYPERCHOLESTEROLEMIA: ICD-10-CM

## 2022-07-08 DIAGNOSIS — E78.2 MIXED HYPERLIPIDEMIA: Primary | ICD-10-CM

## 2022-07-08 PROCEDURE — 93000 ELECTROCARDIOGRAM COMPLETE: CPT | Performed by: INTERNAL MEDICINE

## 2022-07-08 PROCEDURE — 99214 OFFICE O/P EST MOD 30 MIN: CPT | Performed by: INTERNAL MEDICINE

## 2022-07-08 RX ORDER — ROSUVASTATIN CALCIUM 10 MG/1
10 TABLET, COATED ORAL DAILY
Qty: 30 TABLET | Refills: 4 | Status: SHIPPED | OUTPATIENT
Start: 2022-07-08 | End: 2022-09-19

## 2022-07-08 NOTE — PROGRESS NOTES
Service Date: 07/08/2022    PRIMARY CARE AND REFERRING PROVIDER:  Jodi Lemon MD    REASON FOR VISIT:  Mixed dyslipidemia and family history of hyperlipidemia.    HISTORY OF PRESENT ILLNESS:    It was my pleasure to visit with Luis, who is new to Cardiology.  He is a 26-year-old transgender male (on testosterone since 2016) known to have chronic migraines, stable depression (on fluoxetine), ADHD (on Adderall and propranolol), sleep-disordered breathing.  He works as a  at [employer].  Unaccompanied today.  Lives with his partner.    Luis and was diagnosed with hyperlipidemia at age 21.  He has had a mixed dyslipidemia picture with elevated total cholesterol of 250-270, HDL in the 40s, -220, triglycerides in the 200s.  He also has a family history of hyperlipidemia in both parents.  His father took statins from his 20s.  There is no family history of premature coronary artery disease, sudden cardiac death.  His paternal grandfather had a stroke in his 80s.  Luis has not tried any lipid-lowering therapy.    He eats predominantly plant-based diet and admits that his diet is high in carbohydrates, is not known to be diabetic, does take testosterone supplements.  Tries to be physically active, but does not have a regular exercise regimen, weighs 198 pounds with a BMI of 30 kg/m2.  Does not have any sequelae of familial hyperlipidemia, such as tendon xanthomas or xanthelasma.      DIAGNOSTIC DATA:  Labs: Most recent lipid panel shows a total cholesterol of 264, HDL 47, , triglycerides 232. His renal function is normal with a creatinine of 1.1, estimated GFR 88, normal CBC.  Not diabetic (HbA1c 5.4%).  Normal TSH of 1.4.     ECG today shows sinus bradycardia at 56 bpm with normal cardiac intervals and a QTc of 370 milliseconds.    PHYSICAL EXAMINATION:    /86, pulse 63 per minute, BMI 30 kg/m2.  Details of the exam are documented blow.  He does not have any  stigmata of hyperlipidemia, such as tendon xanthomas or xanthelasma.  Cardiovascular has regular heart sounds.  No vascular bruit, no murmur.  Extremities no edema.      DIAGNOSES:    1.  Mixed dyslipidemia with high LDL of 170-220.  2.  Family history of hypercholesterolemia.  3.  On supplemental testosterone.    ASSESSMENT:    With his family history and very high LDL, I am going to initiate lipid-lowering therapy for primary prevention.  He is only 26 years old and therefore too young to get a CT coronary calcium for additional risk stratification.    PLAN:    1.  Given a prescription for rosuvastatin 10 mg daily.  Discussed side effects.  He will call my office if there are issues.  2.  Genetics referral for hyperlipidemia.  3.  Follow up with me in 4 months with pre-visit fasting lipids and liver enzymes.  4.  Consulted that statin therapy is only part of the management for hyperlipidemia.  We talked about heart-healthy diet, cutting back carbohydrates, regular physical exercise.  He does not use tobacco products or recreational drugs.  Drinks 1 alcoholic beverage a day.    Total time today 45 minutes.    Gary Coy MD        D: 2022   T: 2022   MT: LUKE    Name:     DIO SPENCER  MRN:      [id number]        Account:      [id number]   :      1995           Service Date: 2022       Document: [id number]

## 2022-07-08 NOTE — PROGRESS NOTES
"  Clinic visit note dictated. Dictation reference number - [id number]        Today's clinic visit entailed:  Review of the result(s) of each unique test - Labs, ECG.  Ordering of each unique test  Prescription drug management  45 minutes spent on the date of the encounter doing chart review, history and exam, documentation and further activities per the note        Encounter Diagnoses   Name Primary?     Mixed hyperlipidemia Yes     Family history of hypercholesterolemia          Orders Placed This Encounter   Procedures     Comprehensive metabolic panel     Lipid Profile     Adult Genetics & Metabolism Referral     Follow-Up with Cardiology     EKG 12-lead complete w/read - Clinics (performed today)         Vitals: /86   Pulse 63   Ht 1.727 m (5' 8\")   Wt 89.8 kg (198 lb)   LMP  (LMP Unknown)   SpO2 97%   BMI 30.11 kg/m    Wt Readings from Last 5 Encounters:   07/08/22 89.8 kg (198 lb)   06/24/22 89.2 kg (196 lb 9.6 oz)   06/09/22 89.5 kg (197 lb 6.4 oz)   05/06/22 87.9 kg (193 lb 12.8 oz)   03/29/22 87.1 kg (192 lb)       PHYSICAL EXAM:  Constitutional: Comfortable at rest. Cooperative, alert and oriented, well developed, well nourished.  Eyes: Pupils equal and round, conjunctivae and lids unremarkable, sclera white, no xanthalasma,   ENT: Satisfactory dentition.  No cyanosis or pallor.  Neck: No thyromegaly.     Respiratory: Normal respiratory effort with symmetrical chest wall movements and no use of accessory muscles. Bilateral normal breath sounds. No rales or wheeze.  Cardiovascular: Normal jugular venous pulse and pressure.  Normal carotid pulse character and volume.  No carotid bruit.  Apical impulse is undisplaced and normal to palpation without parasternal heave or thrill.  Heart sounds are normal and regular.  No S3 or S4.  No audible murmur or friction rub.  GI: Soft, nontender, no hepatosplenomegaly, no masses, active bowel sounds.    Skin: No rash, erythema, ecchymosis.  Musculoskeletal: " "Normal muscle tone and strength. Normal gait. No spinal deformities.  Neuropsychiatric: Oriented to time place and person.  Affect normal.  No gross motor deficits.  Extremities: No edema, clubbing or deformities.        CURRENT MEDICATIONS:  Current Outpatient Medications   Medication Sig Dispense Refill     amphetamine-dextroamphetamine (ADDERALL XR) 10 MG 24 hr capsule Take 10 mg by mouth daily       eletriptan (RELPAX) 20 MG tablet Take 1 tablet (20 mg) by mouth at onset of headache for migraine May repeat in 2 hours. Max 4 tablets/24 hours. 40 tablet 3     FLUoxetine (PROZAC) 40 MG capsule Take 2 capsules (80 mg) by mouth daily 60 capsule 3     needle, disp, 18G X 1\" MISC 1 applicator every 7 days 100 each 0     Needle, Disp, 23G X 1\" MISC 1 applicator every 7 days 100 each 0     propranolol (INDERAL) 20 MG tablet Take 1 tablet (20 mg) by mouth 2 times daily 60 tablet 3     rosuvastatin (CRESTOR) 10 MG tablet Take 1 tablet (10 mg) by mouth daily 30 tablet 4     Syringe, Disposable, (SYRINGE LUER LOCK) 3 ML MISC 1 applicator every 7 days 100 each 0     testosterone cypionate (DEPOTESTOSTERONE) 200 MG/ML injection Inject 0.3 mLs (60 mg) into the muscle once a week 4 mL 1         ALLERGIES:  No Known Allergies    PAST MEDICAL HISTORY:    Past Medical History:   Diagnosis Date     Current moderate episode of major depressive disorder, unspecified whether recurrent (H)      Gender dysphoria in adult      Motion sickness        PAST SURGICAL HISTORY:    Past Surgical History:   Procedure Laterality Date     MASTECTOMY, BILATERAL  2016     SEPTOPLASTY, TURBINOPLASTY, COMBINED Bilateral 12/16/2021    Procedure: SEPTOPLASTY, NOSE, WITH TURBINOPLASTY;  Surgeon: Sae Knight MD;  Location: [facility] OR     TONSILLECTOMY Bilateral 12/16/2021    Procedure: TONSILLECTOMY;  Surgeon: Sae Knight MD;  Location: [facility] OR       FAMILY HISTORY:    Family History   Problem Relation Age of Onset     Hyperlipidemia Mother  "     Hypertension Mother      Hyperlipidemia Father      Hypertension Father         Took statin at age 27 years     Cerebrovascular Disease Paternal Grandfather        SOCIAL HISTORY:    Social History     Socioeconomic History     Marital status:      Spouse name: None     Number of children: None     Years of education: None     Highest education level: None   Occupational History     Occupation: student     Comment: getting masters in education     Occupation: teacher     Comment: [employer]   Tobacco Use     Smoking status: Never Smoker     Smokeless tobacco: Never Used   Vaping Use     Vaping Use: Never used   Substance and Sexual Activity     Alcohol use: Yes     Drug use: Never     Sexual activity: Yes     Partners: Male   Other Topics Concern     Parent/sibling w/ CABG, MI or angioplasty before 65F 55M? No         CC  REFERRING PROVIDER:  Max Crane MD  [address] [place] [address]

## 2022-07-08 NOTE — PATIENT INSTRUCTIONS
1.  Start a cholesterol-lowering medication called rosuvastatin 10 mg.  Take 1 tablet daily.  New prescription sent.    2.  If any side effects that we discussed, please call my office.    3.  Genetics referral.    4.  Follow-up with Dr. Coy in 4 months with pre-visit fasting blood test.    If you have any questions or concerns, please contact my nurses at [phone].

## 2022-07-08 NOTE — LETTER
"7/8/2022    Jodi Lemon MD  [address] [facility] [address]    RE: Luis Epstein       Dear Colleague,     I had the pleasure of seeing Luis Epstein in the [facility].    Clinic visit note dictated. Dictation reference number - [id number]        Today's clinic visit entailed:  Review of the result(s) of each unique test - Labs, ECG.  Ordering of each unique test  Prescription drug management  45 minutes spent on the date of the encounter doing chart review, history and exam, documentation and further activities per the note        Encounter Diagnoses   Name Primary?     Mixed hyperlipidemia Yes     Family history of hypercholesterolemia          Orders Placed This Encounter   Procedures     Comprehensive metabolic panel     Lipid Profile     Adult Genetics & Metabolism Referral     Follow-Up with Cardiology     EKG 12-lead complete w/read - Clinics (performed today)         Vitals: /86   Pulse 63   Ht 1.727 m (5' 8\")   Wt 89.8 kg (198 lb)   LMP  (LMP Unknown)   SpO2 97%   BMI 30.11 kg/m    Wt Readings from Last 5 Encounters:   07/08/22 89.8 kg (198 lb)   06/24/22 89.2 kg (196 lb 9.6 oz)   06/09/22 89.5 kg (197 lb 6.4 oz)   05/06/22 87.9 kg (193 lb 12.8 oz)   03/29/22 87.1 kg (192 lb)       PHYSICAL EXAM:  Constitutional: Comfortable at rest. Cooperative, alert and oriented, well developed, well nourished.  Eyes: Pupils equal and round, conjunctivae and lids unremarkable, sclera white, no xanthalasma,   ENT: Satisfactory dentition.  No cyanosis or pallor.  Neck: No thyromegaly.     Respiratory: Normal respiratory effort with symmetrical chest wall movements and no use of accessory muscles. Bilateral normal breath sounds. No rales or wheeze.  Cardiovascular: Normal jugular venous pulse and pressure.  Normal carotid pulse character and volume.  No carotid bruit.  Apical impulse is undisplaced and normal to palpation without parasternal heave or thrill.  Heart sounds are " "normal and regular.  No S3 or S4.  No audible murmur or friction rub.  GI: Soft, nontender, no hepatosplenomegaly, no masses, active bowel sounds.    Skin: No rash, erythema, ecchymosis.  Musculoskeletal: Normal muscle tone and strength. Normal gait. No spinal deformities.  Neuropsychiatric: Oriented to time place and person.  Affect normal.  No gross motor deficits.  Extremities: No edema, clubbing or deformities.        CURRENT MEDICATIONS:  Current Outpatient Medications   Medication Sig Dispense Refill     amphetamine-dextroamphetamine (ADDERALL XR) 10 MG 24 hr capsule Take 10 mg by mouth daily       eletriptan (RELPAX) 20 MG tablet Take 1 tablet (20 mg) by mouth at onset of headache for migraine May repeat in 2 hours. Max 4 tablets/24 hours. 40 tablet 3     FLUoxetine (PROZAC) 40 MG capsule Take 2 capsules (80 mg) by mouth daily 60 capsule 3     needle, disp, 18G X 1\" MISC 1 applicator every 7 days 100 each 0     Needle, Disp, 23G X 1\" MISC 1 applicator every 7 days 100 each 0     propranolol (INDERAL) 20 MG tablet Take 1 tablet (20 mg) by mouth 2 times daily 60 tablet 3     rosuvastatin (CRESTOR) 10 MG tablet Take 1 tablet (10 mg) by mouth daily 30 tablet 4     Syringe, Disposable, (SYRINGE LUER LOCK) 3 ML MISC 1 applicator every 7 days 100 each 0     testosterone cypionate (DEPOTESTOSTERONE) 200 MG/ML injection Inject 0.3 mLs (60 mg) into the muscle once a week 4 mL 1         ALLERGIES:  No Known Allergies    PAST MEDICAL HISTORY:    Past Medical History:   Diagnosis Date     Current moderate episode of major depressive disorder, unspecified whether recurrent (H)      Gender dysphoria in adult      Motion sickness        PAST SURGICAL HISTORY:    Past Surgical History:   Procedure Laterality Date     MASTECTOMY, BILATERAL  2016     SEPTOPLASTY, TURBINOPLASTY, COMBINED Bilateral 12/16/2021    Procedure: SEPTOPLASTY, NOSE, WITH TURBINOPLASTY;  Surgeon: Sae Knight MD;  Location:  OR     " TONSILLECTOMY Bilateral 12/16/2021    Procedure: TONSILLECTOMY;  Surgeon: Sae Knight MD;  Location: [facility] OR       FAMILY HISTORY:    Family History   Problem Relation Age of Onset     Hyperlipidemia Mother      Hypertension Mother      Hyperlipidemia Father      Hypertension Father         Took statin at age 27 years     Cerebrovascular Disease Paternal Grandfather        SOCIAL HISTORY:    Social History     Socioeconomic History     Marital status:      Spouse name: None     Number of children: None     Years of education: None     Highest education level: None   Occupational History     Occupation: student     Comment: getting masters in education     Occupation: teacher     Comment: [employer]   Tobacco Use     Smoking status: Never Smoker     Smokeless tobacco: Never Used   Vaping Use     Vaping Use: Never used   Substance and Sexual Activity     Alcohol use: Yes     Drug use: Never     Sexual activity: Yes     Partners: Male   Other Topics Concern     Parent/sibling w/ CABG, MI or angioplasty before 65F 55M? No         CC  REFERRING PROVIDER:  Max Crane MD  [address] [place] [address]                  Service Date: 07/08/2022    PRIMARY CARE AND REFERRING PROVIDER:  Jodi Lemon MD    REASON FOR VISIT:  Mixed dyslipidemia and family history of hyperlipidemia.    HISTORY OF PRESENT ILLNESS:  It was my pleasure to visit with Luis, who is new to Cardiology.  He is a 26-year-old transgender male (on testosterone since 2016) known to have chronic migraines, stable depression (on fluoxetine), ADHD (on Adderall and propranolol), sleep-disordered breathing.  He works as a  at [employer].  Unaccompanied today.  Lives with his partner.    Luis and was diagnosed with hyperlipidemia at age 21.  He has had a mixed dyslipidemia picture with elevated total cholesterol of 250-270, HDL in the 40s, -220,  triglycerides in the 200s.  He also has a family history of hyperlipidemia in both parents.  His father took statins from his 20s.  There is no family history of premature coronary artery disease, sudden cardiac death.  His paternal grandfather had a stroke in his 80s.  Luis has not tried any lipid-lowering therapy.    He eats predominantly plant-based diet and admits that his diet is high in carbohydrates, is not known to be diabetic, does take testosterone supplements.  Tries to be physically active, but does not have a regular exercise regimen, weighs 198 pounds with a BMI of 30 kg/m2.  Does not have any sequelae of familial hyperlipidemia, such as tendon xanthomas or xanthelasma.      His renal function is normal with a creatinine of 1.1, estimated GFR 88, normal CBC.  Not diabetic (HbA1c 5.4%).  Thyroid functional with TSH of 1.4.    Most recent lipid panel shows a total cholesterol of 264, HDL 47, creatinine 1.1, triglycerides 232.    ECG today shows sinus bradycardia at 56 bpm with normal cardiac intervals and a QTc of 370 milliseconds.    PHYSICAL EXAMINATION:  /86, pulse 63 per minute, BMI 30 kg/m2.  Details of the exam are documented blow.  He does not have any stigmata of hyperlipidemia, such as tendon xanthomas or xanthelasma.  Cardiovascular has regular heart sounds.  No vascular bruit, no murmur.  Extremities no edema.      DIAGNOSES:    1.  Mixed dyslipidemia with high LDL of 170-220.  2.  Family history of hypercholesterolemia.  3.  On supplemental testosterone.    ASSESSMENT:  With his family history and very high LDL, I am going to initiate lipid-lowering therapy for primary prevention.  He is only 26 years old and therefore too young to get a CT coronary calcium for additional risk stratification.    PLAN:    1.  Given a prescription for rosuvastatin 10 mg daily.  Discussed side effects.  He will call my office if there are issues.  2.  Genetics referral for hyperlipidemia.  3.  Follow up  with me in 4 months with pre-visit fasting lipids and liver enzymes.  4.  Consulted that statin therapy is only part of the management for hyperlipidemia.  We talked about heart-healthy diet, cutting back carbohydrates, regular physical exercise.  He does not use tobacco products or recreational drugs.  Drinks 1 alcoholic beverage a day.    Total time 45 minutes.    Gary Coy MD        D: 2022   T: 2022   MT:     Name:     DIO SPENCER  MRN:      [id number]        Account:      [id number]   :      1995           Service Date: 2022       Document: [id number]      Thank you for allowing me to participate in the care of your patient.      Sincerely,     Gary Coy MD     [facility]  cc:   Max Crane MD  [address] [place] [address]

## 2022-07-11 NOTE — PROGRESS NOTES
"  Assessment & Plan     Chronic right shoulder pain  Impingement syndrome of shoulder region, right  Neer's and other shoulder tests improved after diagnostic subacromial lidocaine injection c/w impingement syndrome.   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     Leatha Valenzuela MD    Dmitriy Smith is a 26 year old presenting for the following health issues:  Pain (Pt here for right shoulder pain, notices numbness and tingling in right fingers as well. Has been present for 8-9 months. Has gotten worse. No injury. Pain localized to shoulder. Pain is worse in morning. )    Chronic sinus infection [id number], sleeping in weird positions. Then in early 2021 developed right shoulder pain, likely related to some of those positions he was sleeping in.     Started PT in Nov 2021 and has been going regularly since then with minimal improvement. States that at PT he's been working on strengthening his back and stretching out his chest. Has also done acupuncture, which he's not sure if it's helping or not.     States he has polyarticular joint pain-- comes and goes and fluctuates (see Dr. Duncan's notes). This anticipatory pain limits him from engaging in exercise. Hasn't went to see Dr. Robert yet due to some difficulty with referral getting through.(Trying to get in to be evaluated for hypermobility.) Appears it's been sent properly now. She has openings in November.     Patient unsure what actual mechanism is contributing to pain and \"weird feeling\" and would like to have better understanding of what's going on.     Is doing OT hand therapy for elbows and hands bilaterally    Numbness and tingling right hand- middle finger to pinky. Started May 2022 and is going to hand therapy and noticing gradual improvement. Right-hand dominant.     Tightness and pain of entire right shoulder " pain. Wakes him up in the middle of the night. He tries not to sleep on right shoulder, but inevitably he does.      Activities they enjoy:   - Crocheting  - Walking two big dogs  - On computer typing quite a bit    Exercises in PT:   - Strengthening back/shoulder  - Stretching chest (feels really tight)   - Doesn't have spikey ball     Works as teacher. Repetitive tasks, prolonged standing, computer use.      No history of shoulder dislocation.       CURRENT MEDICATIONS: He has a current medication list which includes the following prescription(s): amphetamine-dextroamphetamine, eletriptan, fluoxetine, needle (disp), needle (disp), propranolol, rosuvastatin, syringe luer lock, and testosterone cypionate.     EXAM:  /82   Pulse 60   Temp 98.1  F (36.7  C) (Oral)   Resp 16   Wt 89.8 kg (198 lb)   LMP  (LMP Unknown)   SpO2 98%   BMI 30.11 kg/m      Tenderness at right  AC joint.Worse w/ cross arm.   Active ROM shoulder intact bilaterally.   Empty can negative. Internal/External rotation strength intact.   Neers positive; improved after subacromial lidocaine injection  Nguyen negative  Carrasco's negative (slight pain)   Subscap liftoff 5/5 bilaterally  No sulcus sign.          Patient seen, evaluated and discussed with the resident. I have verified the content of the note, which accurately reflects my assessment of the patient and the plan of care.   Supervising Physician:  Yuliet Perez MD

## 2022-07-12 ENCOUNTER — OFFICE VISIT (OUTPATIENT)
Dept: FAMILY MEDICINE | Facility: CLINIC | Age: 27
End: 2022-07-12
Payer: COMMERCIAL

## 2022-07-12 VITALS
DIASTOLIC BLOOD PRESSURE: 82 MMHG | WEIGHT: 198 LBS | BODY MASS INDEX: 30.11 KG/M2 | SYSTOLIC BLOOD PRESSURE: 126 MMHG | HEART RATE: 60 BPM | RESPIRATION RATE: 16 BRPM | TEMPERATURE: 98.1 F | OXYGEN SATURATION: 98 %

## 2022-07-12 DIAGNOSIS — G56.21 ULNAR NEUROPATHY AT ELBOW, RIGHT: ICD-10-CM

## 2022-07-12 DIAGNOSIS — G89.29 CHRONIC RIGHT SHOULDER PAIN: Primary | ICD-10-CM

## 2022-07-12 DIAGNOSIS — M25.511 CHRONIC RIGHT SHOULDER PAIN: Primary | ICD-10-CM

## 2022-07-12 DIAGNOSIS — M75.41 IMPINGEMENT SYNDROME OF SHOULDER REGION, RIGHT: ICD-10-CM

## 2022-07-12 DIAGNOSIS — J32.0 CHRONIC RIGHT MAXILLARY SINUSITIS: ICD-10-CM

## 2022-07-12 PROCEDURE — 99213 OFFICE O/P EST LOW 20 MIN: CPT | Performed by: FAMILY MEDICINE

## 2022-07-12 NOTE — PATIENT INSTRUCTIONS
Patient Education   Here is the plan from today's visit    1. Chronic right shoulder pain  2. Impingement    - Referral was sent to [facility]  - Baldomero trigger point ball   - Continue PT, work on strengthening protocol, madison scapulothoracic area of back         Please call or return to clinic if your symptoms don't go away.    Thank you for coming to [facility] today.  Lab Testing:  **If you had lab testing today and your results are reassuring or normal they will be mailed to you or sent through [facility] within 7 days.   **If the lab tests need quick action we will call you with the results.  **If you are having labs done on a different day, please call [phone] to schedule at [facility] or [phone] for other [facility] locations. Labs do not offer walk-in appointments.  The phone number we will call with results is # [phone] (home) . If this is not the best number please call our clinic and change the number.  Medication Refills:  If you need any refills please call your pharmacy and they will contact us.   If you need to  your refill at a new pharmacy, please contact the new pharmacy directly. The new pharmacy will help you get your medications transferred faster.   Scheduling:  If you have any concerns about today's visit or wish to schedule another appointment please call our office during normal business hours [phone] (8-5:00 M-F)  If a referral was made to an [facility] specialty provider and you do not get a call from central scheduling, please refer to directions on your visit summary or call our office during normal business hours for assistance.   If a Mammogram was ordered for you at the [facility] call [phone] to schedule or change your appointment.  If you had an XRay/CT/Ultrasound/MRI ordered the number is [phone] to schedule or change your radiology appointment.   [facility] has limited ultrasound appointments available on Wednesdays,  if you would like your ultrasound at [facility], please call [phone] to schedule.   Medical Concerns:  If you have urgent medical concerns please call [phone] at any time of the day.    Yuliet Perez MD

## 2022-07-13 ENCOUNTER — TELEPHONE (OUTPATIENT)
Dept: CARDIOLOGY | Facility: CLINIC | Age: 27
End: 2022-07-13

## 2022-07-18 ENCOUNTER — THERAPY VISIT (OUTPATIENT)
Dept: PHYSICAL THERAPY | Facility: CLINIC | Age: 27
End: 2022-07-18
Payer: COMMERCIAL

## 2022-07-18 DIAGNOSIS — G89.29 CHRONIC PAIN OF RIGHT KNEE: ICD-10-CM

## 2022-07-18 DIAGNOSIS — M25.561 CHRONIC PAIN OF RIGHT KNEE: ICD-10-CM

## 2022-07-18 DIAGNOSIS — M25.511 CHRONIC RIGHT SHOULDER PAIN: Primary | ICD-10-CM

## 2022-07-18 DIAGNOSIS — G89.29 CHRONIC RIGHT SHOULDER PAIN: Primary | ICD-10-CM

## 2022-07-18 PROCEDURE — 97140 MANUAL THERAPY 1/> REGIONS: CPT | Mod: GP | Performed by: PHYSICAL THERAPIST

## 2022-07-18 PROCEDURE — 97110 THERAPEUTIC EXERCISES: CPT | Mod: GP | Performed by: PHYSICAL THERAPIST

## 2022-07-18 NOTE — PROGRESS NOTES
SOAP Note Objective Information for 7/21/2022:    Pain Level (Scale 0-10)   6/6/2022 6/20/22 7/7/22 7/21/22   At Rest 2 0-2 2 left  0.5 right L: 5  R: 2   With Use 2-5 2-3 7 left   3 right L: 7-8  R: 4     Pain Description  Date 6/6/2022   Location wrist, long finger, ring finger and small finger   Pain Quality Burning and Shooting   Frequency constant     Pain is worst  daytime   Exacerbated by  use   Relieved by cold   Progression unchanged     Home Exercise Program:  Exercise Name: Pectoralis Major Trigger Release, Sets: 1 - Sessions: 1x every other day, Notes: 60 sec hold on each knot  Exercise Name: Pectoralis Minor Trigger Release, Sets: 1 - Sessions: 1x every other day, Notes: Also roll to the side and do side muscles.  Exercise Name: Rolling upper back, Sets: 1 - Sessions: 1x every other day  Exercise Name: Spinal extensions, Sets: 1 - Reps: 3-5 reps at each vertebral section - Sessions: 1x every other day  Exercise Name: Latissimus/Teres/Serratus/Subscapularis Trigger Release, Sets: 1 - Sessions: 1x every other day, Notes: Make sure you go into tricep a bit too.  60 sec hold on each knot  Exercise Name: Foam Roller Stretch: Pectoralis Major, Sets: 1 - Sessions: 1x every other day, Notes: 1-5 min  Exercise Name: Foam Roller stretch: Pectoralis Minor - Sessions: 1x every other day, Notes: 1-5 minutes  Exercise Name: Self Elbow Mobilization, Trigger Point Massage Over Radial Head - Sessions: 1x every other day, Notes: Use golf ball to do pressure. Do inner and outer wad of forearm  Finger massage on the tendons in wrist  Exercise Name: Nerve Gliding Proximal Ulnar - Reps: 5-10 - Sessions: 2, Notes: No pain and no tingling.  6/20/22  STM to FA flexors with golf ball  FA flexor stretch  7/7/22  K-tape trial - bilateral MEP/Flexor wad  Proximal strengthening exercises 3 x week:  Supine Ceiling Punch  Shoulder scaption full can  Prone Scapula  7/21/22  Recommend L OTC wrist cock up for night and day per  symptoms    Next Visit:  PN due  See if patient purchased splint  US  STM  Stretches  UN glides  Ergonomic discussion    Please refer to the daily flowsheet for treatment today, total treatment time and time spent performing 1:1 timed codes.

## 2022-07-21 ENCOUNTER — THERAPY VISIT (OUTPATIENT)
Dept: OCCUPATIONAL THERAPY | Facility: CLINIC | Age: 27
End: 2022-07-21
Payer: COMMERCIAL

## 2022-07-21 DIAGNOSIS — G56.21 ULNAR NEUROPATHY AT ELBOW, RIGHT: Primary | ICD-10-CM

## 2022-07-21 PROCEDURE — 97035 APP MDLTY 1+ULTRASOUND EA 15: CPT | Mod: GO | Performed by: OCCUPATIONAL THERAPIST

## 2022-07-21 PROCEDURE — 97140 MANUAL THERAPY 1/> REGIONS: CPT | Mod: GO | Performed by: OCCUPATIONAL THERAPIST

## 2022-07-21 PROCEDURE — 97112 NEUROMUSCULAR REEDUCATION: CPT | Mod: GO | Performed by: OCCUPATIONAL THERAPIST

## 2022-07-26 ASSESSMENT — SLEEP AND FATIGUE QUESTIONNAIRES
HOW LIKELY ARE YOU TO NOD OFF OR FALL ASLEEP WHILE WATCHING TV: SLIGHT CHANCE OF DOZING
HOW LIKELY ARE YOU TO NOD OFF OR FALL ASLEEP WHILE LYING DOWN TO REST IN THE AFTERNOON WHEN CIRCUMSTANCES PERMIT: HIGH CHANCE OF DOZING
HOW LIKELY ARE YOU TO NOD OFF OR FALL ASLEEP WHILE SITTING AND READING: MODERATE CHANCE OF DOZING
HOW LIKELY ARE YOU TO NOD OFF OR FALL ASLEEP WHILE SITTING AND TALKING TO SOMEONE: SLIGHT CHANCE OF DOZING
HOW LIKELY ARE YOU TO NOD OFF OR FALL ASLEEP WHILE SITTING QUIETLY AFTER LUNCH WITHOUT ALCOHOL: MODERATE CHANCE OF DOZING
HOW LIKELY ARE YOU TO NOD OFF OR FALL ASLEEP WHEN YOU ARE A PASSENGER IN A CAR FOR AN HOUR WITHOUT A BREAK: MODERATE CHANCE OF DOZING
HOW LIKELY ARE YOU TO NOD OFF OR FALL ASLEEP WHILE SITTING INACTIVE IN A PUBLIC PLACE: SLIGHT CHANCE OF DOZING
HOW LIKELY ARE YOU TO NOD OFF OR FALL ASLEEP IN A CAR, WHILE STOPPED FOR A FEW MINUTES IN TRAFFIC: SLIGHT CHANCE OF DOZING

## 2022-07-28 PROBLEM — F32.1 CURRENT MODERATE EPISODE OF MAJOR DEPRESSIVE DISORDER, UNSPECIFIED WHETHER RECURRENT (H): Chronic | Status: ACTIVE | Noted: 2020-03-31

## 2022-07-28 PROBLEM — J34.2 DEVIATED NASAL SEPTUM: Status: RESOLVED | Noted: 2021-07-15 | Resolved: 2022-07-28

## 2022-07-28 PROBLEM — J35.01 CHRONIC TONSILLITIS: Status: RESOLVED | Noted: 2021-07-16 | Resolved: 2022-07-28

## 2022-07-28 PROBLEM — F64.0 GENDER DYSPHORIA IN ADULT: Chronic | Status: ACTIVE | Noted: 2019-07-31

## 2022-07-28 PROBLEM — J35.1 TONSILLAR HYPERTROPHY: Status: RESOLVED | Noted: 2021-07-15 | Resolved: 2022-07-28

## 2022-07-28 PROBLEM — E78.2 MIXED HYPERLIPIDEMIA: Chronic | Status: ACTIVE | Noted: 2022-07-28

## 2022-07-28 PROBLEM — G43.709 CHRONIC MIGRAINE WITHOUT AURA WITHOUT STATUS MIGRAINOSUS, NOT INTRACTABLE: Chronic | Status: ACTIVE | Noted: 2021-01-21

## 2022-07-29 ENCOUNTER — OFFICE VISIT (OUTPATIENT)
Dept: SLEEP MEDICINE | Facility: CLINIC | Age: 27
End: 2022-07-29
Attending: PHYSICIAN ASSISTANT
Payer: COMMERCIAL

## 2022-07-29 VITALS
SYSTOLIC BLOOD PRESSURE: 112 MMHG | OXYGEN SATURATION: 96 % | WEIGHT: 194.8 LBS | HEART RATE: 72 BPM | RESPIRATION RATE: 16 BRPM | HEIGHT: 69 IN | DIASTOLIC BLOOD PRESSURE: 75 MMHG | BODY MASS INDEX: 28.85 KG/M2

## 2022-07-29 DIAGNOSIS — R53.83 MALAISE AND FATIGUE: ICD-10-CM

## 2022-07-29 DIAGNOSIS — G47.30 SLEEP-DISORDERED BREATHING: ICD-10-CM

## 2022-07-29 DIAGNOSIS — R06.00 DYSPNEA AND RESPIRATORY ABNORMALITY: Primary | ICD-10-CM

## 2022-07-29 DIAGNOSIS — R06.89 DYSPNEA AND RESPIRATORY ABNORMALITY: Primary | ICD-10-CM

## 2022-07-29 DIAGNOSIS — R53.81 MALAISE AND FATIGUE: ICD-10-CM

## 2022-07-29 DIAGNOSIS — Z72.820 LACK OF ADEQUATE SLEEP: ICD-10-CM

## 2022-07-29 PROBLEM — G89.29 CHRONIC RIGHT SHOULDER PAIN: Chronic | Status: ACTIVE | Noted: 2022-05-06

## 2022-07-29 PROBLEM — M25.511 CHRONIC RIGHT SHOULDER PAIN: Chronic | Status: ACTIVE | Noted: 2022-05-06

## 2022-07-29 PROCEDURE — 99204 OFFICE O/P NEW MOD 45 MIN: CPT | Performed by: PHYSICIAN ASSISTANT

## 2022-07-29 RX ORDER — DEXTROAMPHETAMINE SACCHARATE, AMPHETAMINE ASPARTATE, DEXTROAMPHETAMINE SULFATE AND AMPHETAMINE SULFATE 2.5; 2.5; 2.5; 2.5 MG/1; MG/1; MG/1; MG/1
10 TABLET ORAL DAILY
COMMUNITY
Start: 2022-07-12 | End: 2024-01-28

## 2022-07-29 NOTE — NURSING NOTE
"Chief Complaint   Patient presents with     Sleep Problem     Day time drowsiness and trouble staying awake while driving. Was scheduled for disordered breathing during sleep, but since then pt. was diagnosed with a chronic sinus infection and treated with surgery. No appear to have troubles breathing or snoring since then.       Initial /75   Pulse 72   Resp 16   Ht 1.74 m (5' 8.5\")   Wt 88.4 kg (194 lb 12.8 oz)   LMP  (LMP Unknown)   SpO2 96%   BMI 29.19 kg/m   Estimated body mass index is 29.19 kg/m  as calculated from the following:    Height as of this encounter: 1.74 m (5' 8.5\").    Weight as of this encounter: 88.4 kg (194 lb 12.8 oz).    Medication Reconciliation: complete   ESS: 13  Neck circumference: 40 centimeters.  DME: N/A  Lakisha Mccarthy CMA      "

## 2022-07-29 NOTE — NURSING NOTE
Patient will think about HST and schedule if he wants to when we call him. Lakisha Mccarthy, CMA
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
## 2022-08-01 NOTE — PROGRESS NOTES
Hand Therapy Progress Note  Reporting Period:  6/6/2022 through 8/4/2022    Diagnosis: Right elbow neuropathy, bilateral elbow pain  DOI: February 2022    Occupational Profile Information:  Right hand dominant  Prior functional level:  no limitations  Patient reports symptoms of pain, weakness/loss of strength, numbness and tingling   Special tests:  none.    Previous treatment: none  Barriers include:none  Mobility: No difficulty  Transportation: drives  Currently working in normal job without restrictions  Leisure activities/hobbies: crocheting, gardening, 2 big dogs, house projects  Other: laundry, cooking, yoga, weights, clarinet    Upper Extremity Functional Index Score:  SCORE:   Column Totals: /80: 60   (A lower score indicates greater disability.)    S:  Subjective changes as noted by patient: I started wearing the wrist braces and I think its helping. I wear them when my arms are sore. I've been conscious to not let my elbows hyperextend which helps.  Functional changes noted by patient: Patient reports less pain with biking and computer use.  Response to previous treatment: good  Patient has noted adverse reaction to: None    Objective:  Pain Level (Scale 0-10)   6/6/2022 6/20/22 7/7/22 7/21/22 8/4/22   At Rest 2 0-2 2 left  0.5 right L: 5  R: 2 L: 2  R: 1   With Use 2-5 2-3 7 left   3 right L: 7-8  R: 4 L: 2-6  R: 1-3     Pain Description  Date 6/6/2022 8/4/22   Location wrist, long finger, ring finger and small finger Bilateral medial elbow   Pain Quality Burning and Shooting Aching, bruised   Frequency constant   Intermittent, constant, daily   Pain is worst  daytime daytime   Exacerbated by  use Overuse, hyper extending elbows    Relieved by cold Exercises, rest, splints, avoiding overuse, tape   Progression unchanged improving       Edema  Mild    Edema circ elbow 6/6/2022        R: 18.7 cm  L: 18.0 cm         Sensation  Bramwell-Kimberly Monofilament Sensory Testing:  Right hand 6/6/2022   Thumb 2.83    Index 2.83   Long 2.83   Ring RDN 2.83   Ring UDN 2.83   Small 2.83   1-65-2.83:  Normal  3.22-3.61:  Diminished light touch  3.84-4.31:  Diminished protective sensation  4.56-6.45:  Loss of protective sensation  6.65:  Deep pressure sensation  Absent:  Tested with no response    ROM   WNL bilaterally    Strength   (Measured in pounds)  Pain Report: - none  + mild    ++ moderate    +++ severe    6/6/2022 6/6/2022 8/4/22 8/4/22   Trials Left Right Left Right   1  2  3       Average 96# 85# 104# 96#     Lat Pinch 6/6/2022 6/6/2022 8/4/22 8/4/22   Trials left Right Left Right   1  2  3       Average 21# 25# 26# 28#     3 Pt Pinch 6/6/2022 6/6/2022 8/4/22 8/4/22   Trials Left Right Left Right   1  2  3       Average 22# 24# 21# 21#     Special Tests   - none    + mild    ++ moderate    +++ severe       6/6/2022   Iesha Test + Ring, small   Costoclavicular Test + Ring, small   Elbow Flexion Test + Ring, small   Tinels Cubital Tunnel + Ring, small   Tinels [place] Canal -       Palpation 6/6/2022 8/4/22      Pec major ++ R: +  L: -      Pec minor ++ R: +  L: -      Inner upper arm + R: +  L: +      Pronator ++ R: +  L: +      Volar forearm ++ R: +  L: +        Assessment:  Response to therapy has been improvement to:  Strength:   and pinch  Pain:  intensity of pain is decreased  Overall Assessment:  Patient would benefit from continued therapy to achieve rehab potential  STG/LTG:  See goal sheet for details and updates of remaining functional limitations.     Plan:  I have re-evaluated this patient and find that the nature, scope, duration and intensity of the therapy is appropriate for the medical condition of the patient.  Frequency:  2 X a month, once daily  Duration:  for 2 additional months (total 8 visits)    Home Exercise Program:  Exercise Name: Pectoralis Major Trigger Release, Sets: 1 - Sessions: 1x every other day, Notes: 60 sec hold on each knot  Exercise Name: Pectoralis Minor Trigger Release, Sets: 1  - Sessions: 1x every other day, Notes: Also roll to the side and do side muscles.  Exercise Name: Rolling upper back, Sets: 1 - Sessions: 1x every other day  Exercise Name: Spinal extensions, Sets: 1 - Reps: 3-5 reps at each vertebral section - Sessions: 1x every other day  Exercise Name: Latissimus/Teres/Serratus/Subscapularis Trigger Release, Sets: 1 - Sessions: 1x every other day, Notes: Make sure you go into tricep a bit too.  60 sec hold on each knot  Exercise Name: Foam Roller Stretch: Pectoralis Major, Sets: 1 - Sessions: 1x every other day, Notes: 1-5 min  Exercise Name: Foam Roller stretch: Pectoralis Minor - Sessions: 1x every other day, Notes: 1-5 minutes  Exercise Name: Self Elbow Mobilization, Trigger Point Massage Over Radial Head - Sessions: 1x every other day, Notes: Use golf ball to do pressure. Do inner and outer wad of forearm  Finger massage on the tendons in wrist  Exercise Name: Nerve Gliding Proximal Ulnar - Reps: 5-10 - Sessions: 2, Notes: No pain and no tingling.  6/20/22  STM to FA flexors with golf ball  FA flexor stretch  7/7/22  K-tape trial - bilateral MEP/Flexor wad  Proximal strengthening exercises 3 x week:  Supine Ceiling Punch  Shoulder scaption full can  Prone Scapula  7/21/22  Recommend L OTC wrist cock up for night and day per symptoms  8/4/22  Wrist isometric strengthening  Elbow isometric strengthening    Next Visit:  Check response to strengthening  US  STM  Stretches  UN glides    Please refer to the daily flowsheet for treatment today, total treatment time and time spent performing 1:1 timed codes.

## 2022-08-02 ENCOUNTER — DOCUMENTATION ONLY (OUTPATIENT)
Dept: FAMILY MEDICINE | Facility: CLINIC | Age: 27
End: 2022-08-02

## 2022-08-02 ENCOUNTER — THERAPY VISIT (OUTPATIENT)
Dept: PHYSICAL THERAPY | Facility: CLINIC | Age: 27
End: 2022-08-02
Payer: COMMERCIAL

## 2022-08-02 DIAGNOSIS — M25.511 CHRONIC RIGHT SHOULDER PAIN: ICD-10-CM

## 2022-08-02 DIAGNOSIS — M25.561 CHRONIC PAIN OF RIGHT KNEE: Primary | ICD-10-CM

## 2022-08-02 DIAGNOSIS — G89.29 CHRONIC PAIN OF RIGHT KNEE: Primary | ICD-10-CM

## 2022-08-02 DIAGNOSIS — G89.29 CHRONIC RIGHT SHOULDER PAIN: ICD-10-CM

## 2022-08-02 PROCEDURE — 97112 NEUROMUSCULAR REEDUCATION: CPT | Mod: GP | Performed by: PHYSICAL THERAPIST

## 2022-08-02 PROCEDURE — 97035 APP MDLTY 1+ULTRASOUND EA 15: CPT | Mod: GP | Performed by: PHYSICAL THERAPIST

## 2022-08-02 NOTE — PROGRESS NOTES
"When opening a documentation only encounter, be sure to enter in \"Chief Complaint\" Forms and in \" Comments\" Title of form, description if needed.    Luis is a 26 year old  adult  Form received via: Fax  Form now resides in: Provider Vamsi Bueno MA                  "

## 2022-08-04 ENCOUNTER — THERAPY VISIT (OUTPATIENT)
Dept: OCCUPATIONAL THERAPY | Facility: CLINIC | Age: 27
End: 2022-08-04
Payer: COMMERCIAL

## 2022-08-04 DIAGNOSIS — G56.21 ULNAR NEUROPATHY AT ELBOW, RIGHT: Primary | ICD-10-CM

## 2022-08-04 PROCEDURE — 97140 MANUAL THERAPY 1/> REGIONS: CPT | Mod: 59 | Performed by: OCCUPATIONAL THERAPIST

## 2022-08-04 PROCEDURE — 97112 NEUROMUSCULAR REEDUCATION: CPT | Mod: 59 | Performed by: OCCUPATIONAL THERAPIST

## 2022-08-04 PROCEDURE — 97110 THERAPEUTIC EXERCISES: CPT | Mod: 59 | Performed by: OCCUPATIONAL THERAPIST

## 2022-08-04 PROCEDURE — 97026 INFRARED THERAPY: CPT | Mod: GO | Performed by: OCCUPATIONAL THERAPIST

## 2022-08-29 DIAGNOSIS — F32.1 CURRENT MODERATE EPISODE OF MAJOR DEPRESSIVE DISORDER, UNSPECIFIED WHETHER RECURRENT (H): ICD-10-CM

## 2022-08-29 RX ORDER — FLUOXETINE 40 MG/1
80 CAPSULE ORAL DAILY
Qty: 60 CAPSULE | Refills: 4 | Status: SHIPPED | OUTPATIENT
Start: 2022-08-29 | End: 2023-01-24

## 2022-08-29 NOTE — TELEPHONE ENCOUNTER
"Request for medication refill:  FLUoxetine (PROZAC) 40 MG capsule  Providers if patient needs an appointment and you are willing to give a one month supply please refill for one month and  send a letter/MyChart using \".SMILLIMITEDREFILL\" .smillimited and route chart to \"[facility] \" (Giving one month refill in non controlled medications is strongly recommended before denial)    If refill has been denied, meaning absolutely no refills without visit, please complete the smart phrase \".smirxrefuse\" and route it to the \"[facility] MED REFILLS\"  pool to inform the patient and the pharmacy.    Yudi Denson        "

## 2022-09-12 NOTE — PROGRESS NOTES
SOAP Note Objective Information for 9/15/2022:    Pain Level (Scale 0-10)   6/6/2022 6/20/22 7/7/22 7/21/22 8/4/22 9/15/22   At Rest 2 0-2 2 left  0.5 right L: 5  R: 2 L: 2  R: 1 L: 1  R: 0.5   With Use 2-5 2-3 7 left   3 right L: 7-8  R: 4 L: 2-6  R: 1-3 L: 2  R: 1     Pain Description  Date 6/6/2022 8/4/22 9/15/22   Location wrist, long finger, ring finger and small finger Bilateral medial elbow Bilateral medial elbow   Pain Quality Burning and Shooting Aching, bruised Aching, sore, heat, overused   Frequency constant   Intermittent, constant, daily Daily, intermittent, constant   Pain is worst  daytime daytime daytime   Exacerbated by  use Overuse, hyper extending elbows  Overuse, extending elbows, not keeping joints supported   Relieved by cold Exercises, rest, splints, avoiding overuse, tape Tape, splints, avoiding overuse, rest, self massage, exercises   Progression unchanged improving improving     Home Exercise Program:  Exercise Name: Pectoralis Major Trigger Release, Sets: 1 - Sessions: 1x every other day, Notes: 60 sec hold on each knot  Exercise Name: Pectoralis Minor Trigger Release, Sets: 1 - Sessions: 1x every other day, Notes: Also roll to the side and do side muscles.  Exercise Name: Rolling upper back, Sets: 1 - Sessions: 1x every other day  Exercise Name: Spinal extensions, Sets: 1 - Reps: 3-5 reps at each vertebral section - Sessions: 1x every other day  Exercise Name: Latissimus/Teres/Serratus/Subscapularis Trigger Release, Sets: 1 - Sessions: 1x every other day, Notes: Make sure you go into tricep a bit too.  60 sec hold on each knot  Exercise Name: Foam Roller Stretch: Pectoralis Major, Sets: 1 - Sessions: 1x every other day, Notes: 1-5 min  Exercise Name: Foam Roller stretch: Pectoralis Minor - Sessions: 1x every other day, Notes: 1-5 minutes  Exercise Name: Self Elbow Mobilization, Trigger Point Massage Over Radial Head - Sessions: 1x every other day, Notes: Use golf ball to do pressure. Do  inner and outer wad of forearm  Finger massage on the tendons in wrist  Exercise Name: Nerve Gliding Proximal Ulnar - Reps: 5-10 - Sessions: 2, Notes: No pain and no tingling.  6/20/22  STM to FA flexors with golf ball  FA flexor stretch  7/7/22  K-tape trial - bilateral MEP/Flexor wad  Proximal strengthening exercises 3 x week:  Supine Ceiling Punch  Shoulder scaption full can  Prone Scapula  7/21/22  Recommend L OTC wrist cock up for night and day per symptoms  8/4/22  Wrist isometric strengthening  Elbow isometric strengthening    Next Visit:  PN due - discharge to [facility] if doing well  US  STM  Stretches  UN glides  K-tape    Please refer to the daily flowsheet for treatment today, total treatment time and time spent performing 1:1 timed codes.

## 2022-09-15 ENCOUNTER — THERAPY VISIT (OUTPATIENT)
Dept: PHYSICAL THERAPY | Facility: CLINIC | Age: 27
End: 2022-09-15
Payer: COMMERCIAL

## 2022-09-15 ENCOUNTER — THERAPY VISIT (OUTPATIENT)
Dept: OCCUPATIONAL THERAPY | Facility: CLINIC | Age: 27
End: 2022-09-15

## 2022-09-15 DIAGNOSIS — M25.511 CHRONIC RIGHT SHOULDER PAIN: ICD-10-CM

## 2022-09-15 DIAGNOSIS — G56.21 ULNAR NEUROPATHY AT ELBOW, RIGHT: Primary | ICD-10-CM

## 2022-09-15 DIAGNOSIS — G89.29 CHRONIC RIGHT SHOULDER PAIN: ICD-10-CM

## 2022-09-15 DIAGNOSIS — M25.561 CHRONIC PAIN OF RIGHT KNEE: Primary | ICD-10-CM

## 2022-09-15 DIAGNOSIS — G89.29 CHRONIC PAIN OF RIGHT KNEE: Primary | ICD-10-CM

## 2022-09-15 PROCEDURE — 97035 APP MDLTY 1+ULTRASOUND EA 15: CPT | Mod: GO | Performed by: OCCUPATIONAL THERAPIST

## 2022-09-15 PROCEDURE — 97110 THERAPEUTIC EXERCISES: CPT | Mod: GO | Performed by: OCCUPATIONAL THERAPIST

## 2022-09-15 PROCEDURE — 97140 MANUAL THERAPY 1/> REGIONS: CPT | Mod: GO | Performed by: OCCUPATIONAL THERAPIST

## 2022-09-15 PROCEDURE — 97112 NEUROMUSCULAR REEDUCATION: CPT | Mod: GO | Performed by: OCCUPATIONAL THERAPIST

## 2022-09-15 PROCEDURE — 97110 THERAPEUTIC EXERCISES: CPT | Mod: GP | Performed by: PHYSICAL THERAPIST

## 2022-09-18 ENCOUNTER — HEALTH MAINTENANCE LETTER (OUTPATIENT)
Age: 27
End: 2022-09-18

## 2022-09-18 ASSESSMENT — HEADACHE IMPACT TEST (HIT 6)
HOW OFTEN HAVE YOU FELT TOO TIRED TO WORK BECAUSE OF YOUR HEADACHES: SOMETIMES
WHEN YOU HAVE HEADACHES HOW OFTEN IS THE PAIN SEVERE: SOMETIMES
HOW OFTEN HAVE YOU FELT FED UP OR IRRITATED BECAUSE OF YOUR HEADACHES: SOMETIMES
HOW OFTEN DO HEADACHES LIMIT YOUR DAILY ACTIVITIES: VERY OFTEN
WHEN YOU HAVE A HEADACHE HOW OFTEN DO YOU WISH YOU COULD LIE DOWN: ALWAYS
HOW OFTEN DID HEADACHS LIMIT CONCENTRATION ON WORK OR DAILY ACTIVITY: VERY OFTEN
HIT6 TOTAL SCORE: 65

## 2022-09-22 ENCOUNTER — VIRTUAL VISIT (OUTPATIENT)
Dept: NEUROLOGY | Facility: CLINIC | Age: 27
End: 2022-09-22
Attending: FAMILY MEDICINE
Payer: COMMERCIAL

## 2022-09-22 DIAGNOSIS — G43.909 MIGRAINE WITHOUT STATUS MIGRAINOSUS, NOT INTRACTABLE, UNSPECIFIED MIGRAINE TYPE: ICD-10-CM

## 2022-09-22 PROCEDURE — 99203 OFFICE O/P NEW LOW 30 MIN: CPT | Mod: GT | Performed by: PSYCHIATRY & NEUROLOGY

## 2022-09-22 RX ORDER — PROCHLORPERAZINE MALEATE 10 MG
10 TABLET ORAL EVERY 6 HOURS PRN
Qty: 10 TABLET | Refills: 11 | Status: SHIPPED | OUTPATIENT
Start: 2022-09-22 | End: 2024-02-15

## 2022-09-22 RX ORDER — ELETRIPTAN HYDROBROMIDE 20 MG/1
20 TABLET, FILM COATED ORAL
Qty: 18 TABLET | Refills: 11 | Status: SHIPPED | OUTPATIENT
Start: 2022-09-22 | End: 2024-01-28

## 2022-09-22 RX ORDER — PROPRANOLOL HYDROCHLORIDE 20 MG/1
80 TABLET ORAL 2 TIMES DAILY
Qty: 240 TABLET | Refills: 3 | Status: SHIPPED | OUTPATIENT
Start: 2022-09-22 | End: 2022-09-28

## 2022-09-22 ASSESSMENT — MIGRAINE DISABILITY ASSESSMENT (MIDAS)
HOW MANY DAYS IN THE PAST 3 MONTHS HAVE YOU HAD A HEADACHE: 15
HOW OFTEN WERE SOCIAL ACTIVITIES MISSED DUE TO HEADACHES: 5
TOTAL SCORE: 32
ON A SCALE FROM 0-10 ON AVERAGE HOW PAINFUL WERE HEADACHES: 5
HOW MANY DAYS DID YOU MISS WORK OR SCHOOL BECAUSE OF HEADACHES: 2
HOW MANY DAYS WAS HOUSEWORK PRODUCTIVITY CUT IN HALF DUE TO HEADACHES: 10
HOW MANY DAYS DID YOU NOT DO HOUSEWORK BECAUSE OF HEADACHES: 5
HOW MANY DAYS WAS YOUR PRODUCTIVITY CUT IN HALF BECAUSE OF HEADACHES: 10

## 2022-09-22 NOTE — PROGRESS NOTES
Luis is a 27 year old who is being evaluated via a billable video visit.      How would you like to obtain your AVS? MyChart  If the video visit is dropped, the invitation should be resent by: Text to cell phone: [phone]  Will anyone else be joining your video visit? No        Video-Visit Details    Video Start Time: 12:30 PM    Type of service:  Video Visit    Video End Time:1:08 PM    Originating Location (pt. Location): Home    Distant Location (provider location):  [facility] NEUROLOGY [facility]     Platform used for Video Visit: [facility]   Virtual Headache Neurology Consult  September 22, 2022     Luis Epstein MRN# [id number]   YOB: 1995 Age: 27 year old     Requesting physician: Ivonne Pina MD         Assessment and Recommendations:     Luis Epstein is a 27 year old adult who presents for further evaluation of headache.    His headache presentation meets criteria for episodic migraine without aura.  This is complicated by history of chronic sinus infection and sinus surgery, now stable.  I suspect he has migraine on a genetic basis.  His virtual neurologic examination is intact today.     I did not recommend further evaluation for secondary causes of headache today.  We reviewed symptomatic treatment strategy, with both acute and preventative treatment.  - For acute treatment of mild headache, he will continue ibuprofen as needed, not to exceed more than 14 days/month to avoid medication overuse.  - For acute treatment of moderate to severe headache, I recommend eletriptan 20 mg at the onset of headache, with a repeat dose in 2 hours if needed.  This should not exceed more than 9 days/month to avoid medication overuse.  - For headache related nausea, or as a rescue medicine for headache, I recommend prochlorperazine 10 mg as needed.     His headache frequency and severity warrant prevention.  -I recommend optimizing his dose of propranolol twice daily,  increasing by 40 mg total each week, as needed and tolerated, to a maximum dose of 80 mg twice daily.  I recommend a 6 to 8-week trial to maximum tolerated dose prior to determining effectiveness.  -If propranolol is not tolerated or not effective after an adequate trial, amitriptyline, topiramate, or a CGRP inhibitor could be considered as alternative options.    He maintains an excellent headache diary, and will continue this as we are optimizing propranolol.    I will plan to see him back in 3 months to monitor his progress.    Mirtha Acosta MD  Neurology            Chief Complaint:     Chief Complaint   Patient presents with     Video Visit     Chronic migraine without aura without status migrainosus, not intractable            History is obtained from the patient and medical record.  Patient was seen via a virtual visit in their home.      Luis Epstein is a 27 year old adult who has been living with headache since age 6. MRI brain was normal as a child, reportedly. Headaches worsened around menarche. Continuous birth control and Mirena were helpful, as they suppressed periods. He has been taking testosterone for years now, and has not noticed any headache changes associated.    Headaches occur behind the eyes or the back of the head. It is stabbing and rates 6-9/10. It lasts all day, over 4 hours. He has associated nausea and vomiting, photophobia.    He denies aura. He denies positional headache, but prefers to lay down. Changing positions (getting up from laying down, routine movement) can worsen pain. He denies unilateral autonomic features. He denies fevers or other illness associated currently, previously worse with sinus infection.     He had sinus infection and sinus surgery last year. Headaches worsened temporarily with this and have now returned to baseline.    He has 8-9/30 headache days per month, with 1-2/30 severe headache days per month.     He has gone to the ER for headache with vomiting, about  three times total.    Triggers include caffeine, chocolate, Skittles, summer (humidity).    He takes eletriptan 20 mg as needed. He can have body aches after taking it, but this is tolerable.     He previously took sumatriptan without effect.     He takes ibuprofen for mild headaches.    He uses CBD for muscle tension.    Propranolol 20 mg BID is not helpful for headache prevention.            Past Medical History:     Past Medical History:   Diagnosis Date     Chronic tonsillitis 7/16/2021     Current moderate episode of major depressive disorder, unspecified whether recurrent (H)      Deviated nasal septum 7/15/2021    status post septoplasty, tonsillectomy on 12/15/2021     Gender dysphoria in adult      Motion sickness      Tonsillar hypertrophy 7/15/2021   Depression and anxiety - stable with fluoxetine and therapy           Past Surgical History:     Past Surgical History:   Procedure Laterality Date     MASTECTOMY, BILATERAL  2016     SEPTOPLASTY, TURBINOPLASTY, COMBINED Bilateral 12/16/2021    Procedure: SEPTOPLASTY, NOSE, WITH TURBINOPLASTY;  Surgeon: Sae Knight MD;  Location: [facility] OR     TONSILLECTOMY Bilateral 12/16/2021    Procedure: TONSILLECTOMY;  Surgeon: Sae Knight MD;  Location: [facility] OR   wisdom teeth removal            Social History:   He is working as a .     He drinks alcohol up to 2-3 drinks, 4 times per week. He is being more conscious about alcohol use lately.    He denies caffeine use.    Social History     Socioeconomic History     Marital status:      Spouse name: Not on file     Number of children: Not on file     Years of education: Not on file     Highest education level: Not on file   Occupational History     Occupation: student     Comment: getting masters in education     Occupation: teacher     Comment: [employer]   Tobacco Use     Smoking status: Never Smoker     Smokeless tobacco: Never Used   Vaping Use     Vaping Use: Never used   Substance  "and Sexual Activity     Alcohol use: Yes     Drug use: Never     Sexual activity: Yes     Partners: Male   Other Topics Concern     Parent/sibling w/ CABG, MI or angioplasty before 65F 55M? No   Social History Narrative     Not on file     Social Determinants of Health     Financial Resource Strain: Not on file   Food Insecurity: Not on file   Transportation Needs: Not on file   Physical Activity: Not on file   Stress: Not on file   Social Connections: Not on file   Intimate Partner Violence: Not on file   Housing Stability: Not on file             Family History:   Paternal grandmother with migraine.     Family History   Problem Relation Age of Onset     Hyperlipidemia Mother      Hypertension Mother      Hyperlipidemia Father      Hypertension Father         Took statin at age 27 years     Cerebrovascular Disease Paternal Grandfather              Allergies:    No Known Allergies          Medications:     Current Outpatient Medications:      amphetamine-dextroamphetamine (ADDERALL XR) 15 MG 24 hr capsule, Take 15 mg by mouth daily, Disp: , Rfl:      amphetamine-dextroamphetamine (ADDERALL) 10 MG tablet, Take 10 mg by mouth daily Take 10 mg by mouth daily in the afternoon when the XR tablet begins to wear off, Disp: , Rfl:      eletriptan (RELPAX) 20 MG tablet, Take 1 tablet (20 mg) by mouth at onset of headache for migraine May repeat in 2 hours. Max 4 tablets/24 hours., Disp: 40 tablet, Rfl: 3     FLUoxetine (PROZAC) 40 MG capsule, Take 2 capsules (80 mg) by mouth daily, Disp: 60 capsule, Rfl: 4     needle, disp, 18G X 1\" MISC, 1 applicator every 7 days, Disp: 100 each, Rfl: 0     Needle, Disp, 23G X 1\" MISC, 1 applicator every 7 days, Disp: 100 each, Rfl: 0     propranolol (INDERAL) 20 MG tablet, Take 1 tablet (20 mg) by mouth 2 times daily, Disp: 60 tablet, Rfl: 3     rosuvastatin (CRESTOR) 10 MG tablet, Take 1 tablet (10 mg) by mouth daily, Disp: 90 tablet, Rfl: 3     Syringe, Disposable, (SYRINGE LUER LOCK) 3 " ML MISC, 1 applicator every 7 days, Disp: 100 each, Rfl: 0     testosterone cypionate (DEPOTESTOSTERONE) 200 MG/ML injection, Inject 0.3 mLs (60 mg) into the muscle once a week, Disp: 4 mL, Rfl: 1          Physical Exam:   There were no vitals taken for this visit.   Physical Exam:   General: NAD  Neurologic:   Mental Status Exam: Alert, awake and oriented to situation. No dysarthria. Speech of normal fluency.   Cranial Nerves: Pupils equal, EOMs intact, no nystagmus, facial movements symmetric, hearing intact to conversation, tongue midline and fully mobile.   Motor: No drift in upper extremities. Able to stand from a seated position without use of arms. No tremors or abnormal movements noted.   Coordination: With arms outstretched, able to touch nose using index finger accurately bilaterally.  Normal finger tapping bilaterally.     Gait: Normal stance and casual gait.    Neck: Normal range of motion with lateral head movements and neck flexion (right sided neck and head pain with lateral head movement to the right).  Eyes: No conjunctival injection, no scleral icterus.           Data:     CT sinus (11/23/2021):  Continued subtotal opacification of the right maxillary  sinus by what appears to be a large mucous retention cyst, similar to  prior.

## 2022-09-22 NOTE — LETTER
9/22/2022         RE: Luis Epstein  [address] [facility] [address]        Dear Colleague,    Thank you for referring your patient, Luis Epstein, to the [facility]. Please see a copy of my visit note below.    Luis is a 27 year old who is being evaluated via a billable video visit.      How would you like to obtain your AVS? MyChart  If the video visit is dropped, the invitation should be resent by: Text to cell phone: [phone]  Will anyone else be joining your video visit? No        Video-Visit Details    Video Start Time: 12:30 PM    Type of service:  Video Visit    Video End Time:1:08 PM    Originating Location (pt. Location): Home    Distant Location (provider location):  [facility]     Platform used for Video Visit: Well     [facility]   Virtual Headache Neurology Consult  September 22, 2022     Luis Epstein MRN# [id number]   YOB: 1995 Age: 27 year old     Requesting physician: Ivonne Pina MD         Assessment and Recommendations:     Luis Eptsein is a 27 year old adult who presents for further evaluation of headache.    His headache presentation meets criteria for episodic migraine without aura.  This is complicated by history of chronic sinus infection and sinus surgery, now stable.  I suspect he has migraine on a genetic basis.  His virtual neurologic examination is intact today.     I did not recommend further evaluation for secondary causes of headache today.  We reviewed symptomatic treatment strategy, with both acute and preventative treatment.  - For acute treatment of mild headache, he will continue ibuprofen as needed, not to exceed more than 14 days/month to avoid medication overuse.  - For acute treatment of moderate to severe headache, I recommend eletriptan 20 mg at the onset of headache, with a repeat dose in 2 hours if needed.  This should not exceed more than 9 days/month to avoid  medication overuse.  - For headache related nausea, or as a rescue medicine for headache, I recommend prochlorperazine 10 mg as needed.     His headache frequency and severity warrant prevention.  -I recommend optimizing his dose of propranolol twice daily, increasing by 40 mg total each week, as needed and tolerated, to a maximum dose of 80 mg twice daily.  I recommend a 6 to 8-week trial to maximum tolerated dose prior to determining effectiveness.  -If propranolol is not tolerated or not effective after an adequate trial, amitriptyline, topiramate, or a CGRP inhibitor could be considered as alternative options.    He maintains an excellent headache diary, and will continue this as we are optimizing propranolol.    I will plan to see him back in 3 months to monitor his progress.    Mirtha Acosta MD  Neurology            Chief Complaint:     Chief Complaint   Patient presents with     Video Visit     Chronic migraine without aura without status migrainosus, not intractable            History is obtained from the patient and medical record.  Patient was seen via a virtual visit in their home.      Luis Epstein is a 27 year old adult who has been living with headache since age 6. MRI brain was normal as a child, reportedly. Headaches worsened around menarche. Continuous birth control and Mirena were helpful, as they suppressed periods. He has been taking testosterone for years now, and has not noticed any headache changes associated.    Headaches occur behind the eyes or the back of the head. It is stabbing and rates 6-9/10. It lasts all day, over 4 hours. He has associated nausea and vomiting, photophobia.    He denies aura. He denies positional headache, but prefers to lay down. Changing positions (getting up from laying down, routine movement) can worsen pain. He denies unilateral autonomic features. He denies fevers or other illness associated currently, previously worse with sinus infection.     He had sinus  infection and sinus surgery last year. Headaches worsened temporarily with this and have now returned to baseline.    He has 8-9/30 headache days per month, with 1-2/30 severe headache days per month.     He has gone to the ER for headache with vomiting, about three times total.    Triggers include caffeine, chocolate, Skittles, summer (humidity).    He takes eletriptan 20 mg as needed. He can have body aches after taking it, but this is tolerable.     He previously took sumatriptan without effect.     He takes ibuprofen for mild headaches.    He uses CBD for muscle tension.    Propranolol 20 mg BID is not helpful for headache prevention.            Past Medical History:     Past Medical History:   Diagnosis Date     Chronic tonsillitis 7/16/2021     Current moderate episode of major depressive disorder, unspecified whether recurrent (H)      Deviated nasal septum 7/15/2021    status post septoplasty, tonsillectomy on 12/15/2021     Gender dysphoria in adult      Motion sickness      Tonsillar hypertrophy 7/15/2021   Depression and anxiety - stable with fluoxetine and therapy           Past Surgical History:     Past Surgical History:   Procedure Laterality Date     MASTECTOMY, BILATERAL  2016     SEPTOPLASTY, TURBINOPLASTY, COMBINED Bilateral 12/16/2021    Procedure: SEPTOPLASTY, NOSE, WITH TURBINOPLASTY;  Surgeon: Sae Knight MD;  Location: [facility] OR     TONSILLECTOMY Bilateral 12/16/2021    Procedure: TONSILLECTOMY;  Surgeon: Sae Knight MD;  Location: [facility] OR   wisdom teeth removal            Social History:   He is working as a .     He drinks alcohol up to 2-3 drinks, 4 times per week. He is being more conscious about alcohol use lately.    He denies caffeine use.    Social History     Socioeconomic History     Marital status:      Spouse name: Not on file     Number of children: Not on file     Years of education: Not on file     Highest education level: Not on file  "  Occupational History     Occupation: student     Comment: getting masters in education     Occupation: teacher     Comment: [employer]   Tobacco Use     Smoking status: Never Smoker     Smokeless tobacco: Never Used   Vaping Use     Vaping Use: Never used   Substance and Sexual Activity     Alcohol use: Yes     Drug use: Never     Sexual activity: Yes     Partners: Male   Other Topics Concern     Parent/sibling w/ CABG, MI or angioplasty before 65F 55M? No   Social History Narrative     Not on file     Social Determinants of Health     Financial Resource Strain: Not on file   Food Insecurity: Not on file   Transportation Needs: Not on file   Physical Activity: Not on file   Stress: Not on file   Social Connections: Not on file   Intimate Partner Violence: Not on file   Housing Stability: Not on file             Family History:   Paternal grandmother with migraine.     Family History   Problem Relation Age of Onset     Hyperlipidemia Mother      Hypertension Mother      Hyperlipidemia Father      Hypertension Father         Took statin at age 27 years     Cerebrovascular Disease Paternal Grandfather              Allergies:    No Known Allergies          Medications:     Current Outpatient Medications:      amphetamine-dextroamphetamine (ADDERALL XR) 15 MG 24 hr capsule, Take 15 mg by mouth daily, Disp: , Rfl:      amphetamine-dextroamphetamine (ADDERALL) 10 MG tablet, Take 10 mg by mouth daily Take 10 mg by mouth daily in the afternoon when the XR tablet begins to wear off, Disp: , Rfl:      eletriptan (RELPAX) 20 MG tablet, Take 1 tablet (20 mg) by mouth at onset of headache for migraine May repeat in 2 hours. Max 4 tablets/24 hours., Disp: 40 tablet, Rfl: 3     FLUoxetine (PROZAC) 40 MG capsule, Take 2 capsules (80 mg) by mouth daily, Disp: 60 capsule, Rfl: 4     needle, disp, 18G X 1\" MISC, 1 applicator every 7 days, Disp: 100 each, Rfl: 0     Needle, Disp, 23G X 1\" MISC, 1 applicator every 7 days, Disp: 100 " each, Rfl: 0     propranolol (INDERAL) 20 MG tablet, Take 1 tablet (20 mg) by mouth 2 times daily, Disp: 60 tablet, Rfl: 3     rosuvastatin (CRESTOR) 10 MG tablet, Take 1 tablet (10 mg) by mouth daily, Disp: 90 tablet, Rfl: 3     Syringe, Disposable, (SYRINGE LUER LOCK) 3 ML MISC, 1 applicator every 7 days, Disp: 100 each, Rfl: 0     testosterone cypionate (DEPOTESTOSTERONE) 200 MG/ML injection, Inject 0.3 mLs (60 mg) into the muscle once a week, Disp: 4 mL, Rfl: 1          Physical Exam:   There were no vitals taken for this visit.   Physical Exam:   General: NAD  Neurologic:   Mental Status Exam: Alert, awake and oriented to situation. No dysarthria. Speech of normal fluency.   Cranial Nerves: Pupils equal, EOMs intact, no nystagmus, facial movements symmetric, hearing intact to conversation, tongue midline and fully mobile.   Motor: No drift in upper extremities. Able to stand from a seated position without use of arms. No tremors or abnormal movements noted.   Coordination: With arms outstretched, able to touch nose using index finger accurately bilaterally.  Normal finger tapping bilaterally.     Gait: Normal stance and casual gait.    Neck: Normal range of motion with lateral head movements and neck flexion (right sided neck and head pain with lateral head movement to the right).  Eyes: No conjunctival injection, no scleral icterus.           Data:     CT sinus (11/23/2021):  Continued subtotal opacification of the right maxillary  sinus by what appears to be a large mucous retention cyst, similar to  prior.          Again, thank you for allowing me to participate in the care of your patient.        Sincerely,        Mirtha Acosta MD

## 2022-09-22 NOTE — NURSING NOTE
Chief Complaint   Patient presents with     Video Visit     Chronic migraine without aura without status migrainosus, not intractable

## 2022-09-27 ENCOUNTER — MYC MEDICAL ADVICE (OUTPATIENT)
Dept: CARDIOLOGY | Facility: CLINIC | Age: 27
End: 2022-09-27

## 2022-09-27 DIAGNOSIS — E78.2 MIXED HYPERLIPIDEMIA: ICD-10-CM

## 2022-09-27 RX ORDER — ROSUVASTATIN CALCIUM 10 MG/1
10 TABLET, COATED ORAL DAILY
Qty: 90 TABLET | Refills: 3 | Status: SHIPPED | OUTPATIENT
Start: 2022-09-27 | End: 2022-11-11

## 2022-09-27 NOTE — TELEPHONE ENCOUNTER
My chart message from patient:  Luis Epstein VANESSA [facility] Team 2  Hi!     Unfortunately my insurance is no longer allowing me to refill anything at [facility]. Can you send a 90 day prescription for the Rosuvastatin to the [facility] in [facility] I have on file.       Thanks!     Reply to patient:    Julisa, Mr. Epstein,    We sent a new script for the rosuvastatin to the [facility] in [place] at 4:50 today.    Team 2 RNs  [phone]

## 2022-09-28 DIAGNOSIS — G43.909 MIGRAINE WITHOUT STATUS MIGRAINOSUS, NOT INTRACTABLE, UNSPECIFIED MIGRAINE TYPE: ICD-10-CM

## 2022-09-28 RX ORDER — PROPRANOLOL HYDROCHLORIDE 20 MG/1
80 TABLET ORAL 2 TIMES DAILY
Qty: 720 TABLET | Refills: 3 | Status: SHIPPED | OUTPATIENT
Start: 2022-09-28 | End: 2024-02-15

## 2022-11-07 ENCOUNTER — LAB (OUTPATIENT)
Dept: LAB | Facility: CLINIC | Age: 27
End: 2022-11-07
Payer: COMMERCIAL

## 2022-11-07 DIAGNOSIS — E78.2 MIXED HYPERLIPIDEMIA: ICD-10-CM

## 2022-11-07 DIAGNOSIS — Z83.42 FAMILY HISTORY OF HYPERCHOLESTEROLEMIA: ICD-10-CM

## 2022-11-07 LAB
ALBUMIN SERPL-MCNC: 3.8 G/DL (ref 3.4–5)
ALP SERPL-CCNC: 88 U/L (ref 40–150)
ALT SERPL W P-5'-P-CCNC: 64 U/L (ref 0–70)
ANION GAP SERPL CALCULATED.3IONS-SCNC: 5 MMOL/L (ref 3–14)
AST SERPL W P-5'-P-CCNC: 35 U/L (ref 0–45)
BILIRUB SERPL-MCNC: 0.5 MG/DL (ref 0.2–1.3)
BUN SERPL-MCNC: 15 MG/DL (ref 7–30)
CALCIUM SERPL-MCNC: 8.8 MG/DL (ref 8.5–10.1)
CHLORIDE BLD-SCNC: 107 MMOL/L (ref 94–109)
CHOLEST SERPL-MCNC: 198 MG/DL
CO2 SERPL-SCNC: 26 MMOL/L (ref 20–32)
CREAT SERPL-MCNC: 1.03 MG/DL (ref 0.52–1.25)
FASTING STATUS PATIENT QL REPORTED: YES
GFR SERPL CREATININE-BSD FRML MDRD: >90 ML/MIN/1.73M2
GLUCOSE BLD-MCNC: 88 MG/DL (ref 70–99)
HDLC SERPL-MCNC: 48 MG/DL
LDLC SERPL CALC-MCNC: 115 MG/DL
NONHDLC SERPL-MCNC: 150 MG/DL
POTASSIUM BLD-SCNC: 4 MMOL/L (ref 3.4–5.3)
PROT SERPL-MCNC: 6.8 G/DL (ref 6.8–8.8)
SODIUM SERPL-SCNC: 138 MMOL/L (ref 133–144)
TRIGL SERPL-MCNC: 175 MG/DL

## 2022-11-07 PROCEDURE — 80061 LIPID PANEL: CPT

## 2022-11-07 PROCEDURE — 36415 COLL VENOUS BLD VENIPUNCTURE: CPT

## 2022-11-07 PROCEDURE — 80053 COMPREHEN METABOLIC PANEL: CPT

## 2022-11-11 ENCOUNTER — OFFICE VISIT (OUTPATIENT)
Dept: CARDIOLOGY | Facility: CLINIC | Age: 27
End: 2022-11-11
Attending: INTERNAL MEDICINE
Payer: COMMERCIAL

## 2022-11-11 VITALS
WEIGHT: 200 LBS | DIASTOLIC BLOOD PRESSURE: 84 MMHG | OXYGEN SATURATION: 98 % | HEIGHT: 68 IN | SYSTOLIC BLOOD PRESSURE: 131 MMHG | BODY MASS INDEX: 30.31 KG/M2 | HEART RATE: 57 BPM

## 2022-11-11 DIAGNOSIS — Z83.42 FAMILY HISTORY OF HYPERCHOLESTEROLEMIA: ICD-10-CM

## 2022-11-11 DIAGNOSIS — E78.2 MIXED HYPERLIPIDEMIA: Primary | ICD-10-CM

## 2022-11-11 PROCEDURE — 99214 OFFICE O/P EST MOD 30 MIN: CPT | Performed by: INTERNAL MEDICINE

## 2022-11-11 RX ORDER — ROSUVASTATIN CALCIUM 20 MG/1
20 TABLET, COATED ORAL DAILY
Qty: 100 TABLET | Refills: 6 | Status: SHIPPED | OUTPATIENT
Start: 2022-11-11 | End: 2023-05-24

## 2022-11-11 NOTE — LETTER
"11/11/2022    Jodi Lemon MD  [address] [facility] [address]    RE: Luis Epstein       Dear Colleague,     I had the pleasure of seeing Luis Epstein in the [facility].    Clinic visit note dictated. Dictation reference number - [id number]        Today's clinic visit entailed:  Review of the result(s) of each unique test - Labs.  Ordering of each unique test  Prescription drug management  30 minutes spent on the date of the encounter doing chart review, history and exam, documentation and further activities per the note.        Encounter Diagnoses   Name Primary?     Mixed hyperlipidemia Yes     Family history of hypercholesterolemia          Orders Placed This Encounter   Procedures     Comprehensive metabolic panel     Lipid Profile     Follow-Up with Cardiology         Vitals: /84   Pulse 57   Ht 1.715 m (5' 7.5\")   Wt 90.7 kg (200 lb)   SpO2 98%   BMI 30.86 kg/m    Wt Readings from Last 5 Encounters:   11/11/22 90.7 kg (200 lb)   07/29/22 88.4 kg (194 lb 12.8 oz)   07/12/22 89.8 kg (198 lb)   07/08/22 89.8 kg (198 lb)   06/24/22 89.2 kg (196 lb 9.6 oz)         CURRENT MEDICATIONS:  Current Outpatient Medications   Medication Sig Dispense Refill     amphetamine-dextroamphetamine (ADDERALL XR) 15 MG 24 hr capsule Take 15 mg by mouth daily       amphetamine-dextroamphetamine (ADDERALL) 10 MG tablet Take 10 mg by mouth daily Take 10 mg by mouth daily in the afternoon when the XR tablet begins to wear off       eletriptan (RELPAX) 20 MG tablet Take 1 tablet (20 mg) by mouth at onset of headache for migraine (repeat in 2 hours if needed) May repeat in 2 hours. Max 4 tablets/24 hours. 18 tablet 11     eletriptan (RELPAX) 20 MG tablet Take 1 tablet (20 mg) by mouth at onset of headache for migraine May repeat in 2 hours. Max 4 tablets/24 hours. 40 tablet 3     FLUoxetine (PROZAC) 40 MG capsule Take 2 capsules (80 mg) by mouth daily 60 capsule 4     needle, disp, 18G X 1\" MISC " "1 applicator every 7 days 100 each 0     Needle, Disp, 23G X 1\" MISC 1 applicator every 7 days 100 each 0     prochlorperazine (COMPAZINE) 10 MG tablet Take 1 tablet (10 mg) by mouth every 6 hours as needed for nausea or vomiting (migraine) 10 tablet 11     propranolol (INDERAL) 20 MG tablet Take 4 tablets (80 mg) by mouth 2 times daily 720 tablet 3     rosuvastatin (CRESTOR) 20 MG tablet Take 1 tablet (20 mg) by mouth daily 100 tablet 6     Syringe, Disposable, (SYRINGE LUER LOCK) 3 ML MISC 1 applicator every 7 days 100 each 0     testosterone cypionate (DEPOTESTOSTERONE) 200 MG/ML injection Inject 0.3 mLs (60 mg) into the muscle once a week 4 mL 1         ALLERGIES:  No Known Allergies    PAST MEDICAL HISTORY:    Past Medical History:   Diagnosis Date     Chronic tonsillitis 7/16/2021     Current moderate episode of major depressive disorder, unspecified whether recurrent (H)      Deviated nasal septum 7/15/2021    status post septoplasty, tonsillectomy on 12/15/2021     Gender dysphoria in adult      Motion sickness      Tonsillar hypertrophy 7/15/2021       PAST SURGICAL HISTORY:    Past Surgical History:   Procedure Laterality Date     MASTECTOMY, BILATERAL  2016     SEPTOPLASTY, TURBINOPLASTY, COMBINED Bilateral 12/16/2021    Procedure: SEPTOPLASTY, NOSE, WITH TURBINOPLASTY;  Surgeon: Sae Knight MD;  Location: [facility]     TONSILLECTOMY Bilateral 12/16/2021    Procedure: TONSILLECTOMY;  Surgeon: Sae Knight MD;  Location: [facility]       FAMILY HISTORY:    Family History   Problem Relation Age of Onset     Hyperlipidemia Mother      Hypertension Mother      Hyperlipidemia Father      Hypertension Father         Took statin at age 28 years     Cerebrovascular Disease Paternal Grandfather        SOCIAL HISTORY:    Social History     Socioeconomic History     Marital status:      Spouse name: None     Number of children: None     Years of education: None     Highest education level: None "   Occupational History     Occupation: student     Comment: getting masters in education     Occupation: teacher     Comment: [email or web address]   Tobacco Use     Smoking status: Never     Smokeless tobacco: Never   Vaping Use     Vaping Use: Never used   Substance and Sexual Activity     Alcohol use: Yes     Drug use: Never     Sexual activity: Yes     Partners: Male   Other Topics Concern     Parent/sibling w/ CABG, MI or angioplasty before 65F 55M? No                       Service Date: 11/11/2022    REASON FOR VISIT:  Followup of mixed dyslipidemia after starting statin therapy.    HISTORY OF PRESENT ILLNESS:    It was my pleasure to visit with Luis Epstein who is a delightful 27-year-old transgender male (on testosterone since 2016) known to have chronic migraines, stable depression (on fluoxetine), ADHD (on Adderall and propranolol), and sleep-disordered breathing.  Luis works as a  at [employer], and lives with his partner.    I have seen Luis for hyperlipidemia, diagnosed at age 21.  His labs were suggestive of mixed dyslipidemia with elevated total cholesterol of 250-270, HDL in the 40s, a very high LDL of 180-220, and high triglycerides in the 200s.  He has a family history of hyperlipidemia, but no coronary artery disease.    At his last visit, I had counseled him extensively about the role of lifestyle modification and management of hyperlipidemia.  I instructed him on rosuvastatin 10 mg daily.  He is tolerating this well without any side effects.  There has been excellent response in his lipid panel.  His total cholesterol is down to 198 (from 264), HDL is stable at 48, LDL is down to 115 (from 171), triglycerides are down to 175 (from 233).  Liver enzymes are normal.  TSH is normal.    Luis has also made commendable lifestyle modifications, walking more, biking and changed his diet to a more heart healthy one.  He feels good.    I referred him for genetic counseling for  hypercholesterolemia, but he decided against this.    DIAGNOSES:     1.  Mixed dyslipidemia.  Rosuvastatin started in 2022.  2.  Family history of hypercholesterolemia, but no coronary artery disease.    PLAN:    1.  I congratulated Luis on his lifestyle modifications.    2.  The following medication changes were made today:  - Increase rosuvastatin from 10 mg daily to 20 mg daily.  New prescription given.    3.  I have ordered fasting labs and follow up with me in 6 months.    It has been my pleasure to see Luis.    Total time today was 30 minutes.    Gary Coy MD        D: 2022   T: 2022   MT: bernardo    Name:     LUIS SPENCER  MRN:      [id number]        Account:      [id number]   :      1995           Service Date: 2022       Document: [id number]    Thank you for allowing me to participate in the care of your patient.      Sincerely,     Gary Coy MD     [facility]  cc:   Gary Coy MD  [address] [place] [id number]

## 2022-11-11 NOTE — PATIENT INSTRUCTIONS
Increase Rosuvastatin to 20 mg daily.  2. Fasting labs and see Dr. Coy in 6 months.    If you have any questions or concerns, please contact my nurses at [phone].

## 2022-11-11 NOTE — PROGRESS NOTES
"  Clinic visit note dictated. Dictation reference number - [id number]        Today's clinic visit entailed:  Review of the result(s) of each unique test - Labs.  Ordering of each unique test  Prescription drug management  30 minutes spent on the date of the encounter doing chart review, history and exam, documentation and further activities per the note.        Encounter Diagnoses   Name Primary?     Mixed hyperlipidemia Yes     Family history of hypercholesterolemia          Orders Placed This Encounter   Procedures     Comprehensive metabolic panel     Lipid Profile     Follow-Up with Cardiology         Vitals: /84   Pulse 57   Ht 1.715 m (5' 7.5\")   Wt 90.7 kg (200 lb)   SpO2 98%   BMI 30.86 kg/m    Wt Readings from Last 5 Encounters:   11/11/22 90.7 kg (200 lb)   07/29/22 88.4 kg (194 lb 12.8 oz)   07/12/22 89.8 kg (198 lb)   07/08/22 89.8 kg (198 lb)   06/24/22 89.2 kg (196 lb 9.6 oz)         CURRENT MEDICATIONS:  Current Outpatient Medications   Medication Sig Dispense Refill     amphetamine-dextroamphetamine (ADDERALL XR) 15 MG 24 hr capsule Take 15 mg by mouth daily       amphetamine-dextroamphetamine (ADDERALL) 10 MG tablet Take 10 mg by mouth daily Take 10 mg by mouth daily in the afternoon when the XR tablet begins to wear off       eletriptan (RELPAX) 20 MG tablet Take 1 tablet (20 mg) by mouth at onset of headache for migraine (repeat in 2 hours if needed) May repeat in 2 hours. Max 4 tablets/24 hours. 18 tablet 11     eletriptan (RELPAX) 20 MG tablet Take 1 tablet (20 mg) by mouth at onset of headache for migraine May repeat in 2 hours. Max 4 tablets/24 hours. 40 tablet 3     FLUoxetine (PROZAC) 40 MG capsule Take 2 capsules (80 mg) by mouth daily 60 capsule 4     needle, disp, 18G X 1\" MISC 1 applicator every 7 days 100 each 0     Needle, Disp, 23G X 1\" MISC 1 applicator every 7 days 100 each 0     prochlorperazine (COMPAZINE) 10 MG tablet Take 1 tablet (10 mg) by mouth every 6 hours as needed " for nausea or vomiting (migraine) 10 tablet 11     propranolol (INDERAL) 20 MG tablet Take 4 tablets (80 mg) by mouth 2 times daily 720 tablet 3     rosuvastatin (CRESTOR) 20 MG tablet Take 1 tablet (20 mg) by mouth daily 100 tablet 6     Syringe, Disposable, (SYRINGE LUER LOCK) 3 ML MISC 1 applicator every 7 days 100 each 0     testosterone cypionate (DEPOTESTOSTERONE) 200 MG/ML injection Inject 0.3 mLs (60 mg) into the muscle once a week 4 mL 1         ALLERGIES:  No Known Allergies    PAST MEDICAL HISTORY:    Past Medical History:   Diagnosis Date     Chronic tonsillitis 7/16/2021     Current moderate episode of major depressive disorder, unspecified whether recurrent (H)      Deviated nasal septum 7/15/2021    status post septoplasty, tonsillectomy on 12/15/2021     Gender dysphoria in adult      Motion sickness      Tonsillar hypertrophy 7/15/2021       PAST SURGICAL HISTORY:    Past Surgical History:   Procedure Laterality Date     MASTECTOMY, BILATERAL  2016     SEPTOPLASTY, TURBINOPLASTY, COMBINED Bilateral 12/16/2021    Procedure: SEPTOPLASTY, NOSE, WITH TURBINOPLASTY;  Surgeon: Sae Knight MD;  Location: [facility]     TONSILLECTOMY Bilateral 12/16/2021    Procedure: TONSILLECTOMY;  Surgeon: Sae Knight MD;  Location: [facility]       FAMILY HISTORY:    Family History   Problem Relation Age of Onset     Hyperlipidemia Mother      Hypertension Mother      Hyperlipidemia Father      Hypertension Father         Took statin at age 28 years     Cerebrovascular Disease Paternal Grandfather        SOCIAL HISTORY:    Social History     Socioeconomic History     Marital status:      Spouse name: None     Number of children: None     Years of education: None     Highest education level: None   Occupational History     Occupation: student     Comment: getting masters in education     Occupation: teacher     Comment: [employer]   Tobacco Use     Smoking status: Never     Smokeless tobacco: Never    Vaping Use     Vaping Use: Never used   Substance and Sexual Activity     Alcohol use: Yes     Drug use: Never     Sexual activity: Yes     Partners: Male   Other Topics Concern     Parent/sibling w/ CABG, MI or angioplasty before 65F 55M? No

## 2022-11-18 NOTE — PROGRESS NOTES
Service Date: 11/11/2022    REASON FOR VISIT:  Followup of mixed dyslipidemia after starting statin therapy.    HISTORY OF PRESENT ILLNESS:    It was my pleasure to visit with Luis Epstein who is a delightful 27-year-old transgender male (on testosterone since 2016) known to have chronic migraines, stable depression (on fluoxetine), ADHD (on Adderall and propranolol), and sleep-disordered breathing.  Luis works as a  at [employer], and lives with his partner.    I have seen Luis for hyperlipidemia, diagnosed at age 21.  His labs were suggestive of mixed dyslipidemia with elevated total cholesterol of 250-270, HDL in the 40s, a very high LDL of 180-220, and high triglycerides in the 200s.  He has a family history of hyperlipidemia, but no coronary artery disease.    At his last visit, I had counseled him extensively about the role of lifestyle modification and management of hyperlipidemia.  I instructed him on rosuvastatin 10 mg daily.  He is tolerating this well without any side effects.  There has been excellent response in his lipid panel.  His total cholesterol is down to 198 (from 264), HDL is stable at 48, LDL is down to 115 (from 171), triglycerides are down to 175 (from 233).  Liver enzymes are normal.  TSH is normal.    Luis has also made commendable lifestyle modifications, walking more, biking and changed his diet to a more heart healthy one.  He feels good.    I referred him for genetic counseling for hypercholesterolemia, but he decided against this.    DIAGNOSES:     1.  Mixed dyslipidemia.  Rosuvastatin started in 07/2022.  2.  Family history of hypercholesterolemia, but no coronary artery disease.    PLAN:    1.  I congratulated Luis on his lifestyle modifications.    2.  The following medication changes were made today:  - Increase rosuvastatin from 10 mg daily to 20 mg daily.  New prescription given.    3.  I have ordered fasting labs and follow up with me in 6  months.    It has been my pleasure to see Luis.    Total time today was 30 minutes.    Gary Coy MD        D: 2022   T: 2022   MT: bernardo    Name:     LUIS SPENCER  MRN:      [id number]        Account:      [id number]   :      1995           Service Date: 2022       Document: [id number]

## 2022-12-28 DIAGNOSIS — F64.0 GENDER DYSPHORIA IN ADULT: ICD-10-CM

## 2022-12-28 NOTE — TELEPHONE ENCOUNTER
"Last seen 7/12/22 by Dr. Perez    Request for medication refill:  testosterone cypionate (DEPOTESTOSTERONE) 200 MG/ML injection  Providers if patient needs an appointment and you are willing to give a one month supply please refill for one month and  send a letter/MyChart using \".SMILLIMITEDREFILL\" .smillimited and route chart to \"P SMI \" (Giving one month refill in non controlled medications is strongly recommended before denial)    If refill has been denied, meaning absolutely no refills without visit, please complete the smart phrase \".smirxrefuse\" and route it to the \"P SMI MED REFILLS\"  pool to inform the patient and the pharmacy.    Maribel Gomez RN        "

## 2022-12-29 RX ORDER — TESTOSTERONE CYPIONATE 200 MG/ML
60 INJECTION, SOLUTION INTRAMUSCULAR WEEKLY
Qty: 4 ML | Refills: 1 | Status: SHIPPED | OUTPATIENT
Start: 2022-12-29 | End: 2023-06-23

## 2022-12-29 NOTE — CONFIDENTIAL NOTE
Testosterone refill request:  - last HRT labs on 4/21/22:        - ALT, AST, Hgb wnl        - Total Testosterone 599  - seeing cardiology for mixed dyslipidemia with plan for follow up labs around 05/2023  - last HRT visit 6/24/22.  - has history of total testosterone labs being elevated, so will advise patient via MyChart to have repeat HRT labs now (total testosterone, ALT, AST, hemoglobin)    Routing to attending for review and signing of controlled substance.    Jodi Lemon MD

## 2023-01-19 ENCOUNTER — LAB (OUTPATIENT)
Dept: LAB | Facility: CLINIC | Age: 28
End: 2023-01-19
Payer: COMMERCIAL

## 2023-01-19 DIAGNOSIS — F64.0 GENDER DYSPHORIA IN ADULT: ICD-10-CM

## 2023-01-19 DIAGNOSIS — E78.2 MIXED HYPERLIPIDEMIA: ICD-10-CM

## 2023-01-19 DIAGNOSIS — Z83.42 FAMILY HISTORY OF HYPERCHOLESTEROLEMIA: ICD-10-CM

## 2023-01-19 LAB
ALBUMIN SERPL BCG-MCNC: 4.5 G/DL (ref 3.5–5.2)
ALP SERPL-CCNC: 94 U/L (ref 35–129)
ALT SERPL W P-5'-P-CCNC: 64 U/L (ref 10–50)
ANION GAP SERPL CALCULATED.3IONS-SCNC: 15 MMOL/L (ref 7–15)
AST SERPL W P-5'-P-CCNC: 41 U/L (ref 10–50)
BILIRUB SERPL-MCNC: 0.3 MG/DL
BUN SERPL-MCNC: 12.7 MG/DL (ref 6–20)
CALCIUM SERPL-MCNC: 10.2 MG/DL (ref 8.6–10)
CHLORIDE SERPL-SCNC: 105 MMOL/L (ref 98–107)
CHOLEST SERPL-MCNC: 183 MG/DL
CREAT SERPL-MCNC: 1.18 MG/DL (ref 0.51–1.17)
DEPRECATED HCO3 PLAS-SCNC: 23 MMOL/L (ref 22–29)
GFR SERPL CREATININE-BSD FRML MDRD: 87 ML/MIN/1.73M2
GLUCOSE SERPL-MCNC: 78 MG/DL (ref 70–99)
HDLC SERPL-MCNC: 43 MG/DL
HGB BLD-MCNC: 15.5 G/DL (ref 11.7–17.7)
LDLC SERPL CALC-MCNC: 83 MG/DL
NONHDLC SERPL-MCNC: 140 MG/DL
POTASSIUM SERPL-SCNC: 4.2 MMOL/L (ref 3.4–5.3)
PROT SERPL-MCNC: 7 G/DL (ref 6.4–8.3)
SODIUM SERPL-SCNC: 143 MMOL/L (ref 136–145)
TRIGL SERPL-MCNC: 284 MG/DL

## 2023-01-19 PROCEDURE — 36415 COLL VENOUS BLD VENIPUNCTURE: CPT

## 2023-01-19 PROCEDURE — 84403 ASSAY OF TOTAL TESTOSTERONE: CPT

## 2023-01-19 PROCEDURE — 80061 LIPID PANEL: CPT

## 2023-01-19 PROCEDURE — 85018 HEMOGLOBIN: CPT

## 2023-01-19 PROCEDURE — 80053 COMPREHEN METABOLIC PANEL: CPT

## 2023-01-24 LAB — TESTOST SERPL-MCNC: 606 NG/DL (ref 8–950)

## 2023-02-11 ENCOUNTER — OFFICE VISIT (OUTPATIENT)
Dept: URGENT CARE | Facility: URGENT CARE | Age: 28
End: 2023-02-11
Payer: COMMERCIAL

## 2023-02-11 VITALS
BODY MASS INDEX: 32.08 KG/M2 | OXYGEN SATURATION: 95 % | DIASTOLIC BLOOD PRESSURE: 87 MMHG | HEART RATE: 65 BPM | RESPIRATION RATE: 20 BRPM | TEMPERATURE: 97.7 F | WEIGHT: 207.9 LBS | SYSTOLIC BLOOD PRESSURE: 126 MMHG

## 2023-02-11 DIAGNOSIS — H66.92 ACUTE OTITIS MEDIA, LEFT: Primary | ICD-10-CM

## 2023-02-11 DIAGNOSIS — H60.502 ACUTE OTITIS EXTERNA OF LEFT EAR, UNSPECIFIED TYPE: ICD-10-CM

## 2023-02-11 PROCEDURE — 99213 OFFICE O/P EST LOW 20 MIN: CPT | Performed by: FAMILY MEDICINE

## 2023-02-11 RX ORDER — OFLOXACIN 3 MG/ML
5 SOLUTION AURICULAR (OTIC) DAILY
Qty: 2 ML | Refills: 0 | Status: SHIPPED | OUTPATIENT
Start: 2023-02-11 | End: 2023-02-18

## 2023-02-11 NOTE — PROGRESS NOTES
Assessment & Plan     Acute otitis media, left  - amoxicillin-clavulanate (AUGMENTIN) 875-125 MG tablet  Dispense: 20 tablet; Refill: 0    Acute otitis externa of left ear, unspecified type  - ofloxacin (FLOXIN) 0.3 % otic solution  Dispense: 2 mL; Refill: 0      Patient education and Handout with home care instructions given. See AVS for details.      Return in about 2 weeks (around 2/25/2023) for Follow up- ear recheck with PCP. Hearing screen..    Farida Burrows MD  [facility] SHELDON Smith is a 27 year old, presenting for the following health issues:  Ear Problem (X4 days)      HPI     Concern - Left ear pain  Onset: 4 days  Description: left ear pressure and pain. No active drainage  Intensity: moderate  Progression of Symptoms:  same  Accompanying Signs & Symptoms: decreased hearing acuity. No fever or chills. No congestion. No recent illnesses.   Previous history of similar problem: No  Precipitating factors:        Worsened by: unsure  Alleviating factors:        Improved by: unsuure  Therapies tried and outcome:  none       Denies     Review of Systems   Constitutional, HEENT, cardiovascular, pulmonary, gi and gu systems are negative, except as otherwise noted.      Objective    /87   Pulse 65   Temp 97.7  F (36.5  C) (Tympanic)   Resp 20   Wt 94.3 kg (207 lb 14.4 oz)   SpO2 95%   BMI 32.08 kg/m    Body mass index is 32.08 kg/m .  Physical Exam   GENERAL: healthy, alert and no distress  EYES: Eyes grossly normal to inspection, PERRL and conjunctivae and sclerae normal  HENT: Left TM appears erythematous and bulging with effusion but not perforated. Inflamed/irritated left ear canal. Right TM appears dull but non-erythematous and non-bulging and TM's normal, nose and mouth without ulcers or lesions  PSYCH: mentation appears normal, affect normal/bright

## 2023-02-28 ENCOUNTER — OFFICE VISIT (OUTPATIENT)
Dept: FAMILY MEDICINE | Facility: CLINIC | Age: 28
End: 2023-02-28
Payer: COMMERCIAL

## 2023-02-28 VITALS
HEIGHT: 70 IN | SYSTOLIC BLOOD PRESSURE: 133 MMHG | DIASTOLIC BLOOD PRESSURE: 87 MMHG | WEIGHT: 209.6 LBS | HEART RATE: 59 BPM | OXYGEN SATURATION: 97 % | BODY MASS INDEX: 30.01 KG/M2 | TEMPERATURE: 97.7 F

## 2023-02-28 DIAGNOSIS — H66.002 NON-RECURRENT ACUTE SUPPURATIVE OTITIS MEDIA OF LEFT EAR WITHOUT SPONTANEOUS RUPTURE OF TYMPANIC MEMBRANE: Primary | ICD-10-CM

## 2023-02-28 PROCEDURE — 99214 OFFICE O/P EST MOD 30 MIN: CPT | Mod: GC | Performed by: STUDENT IN AN ORGANIZED HEALTH CARE EDUCATION/TRAINING PROGRAM

## 2023-02-28 RX ORDER — CEFDINIR 300 MG/1
300 CAPSULE ORAL 2 TIMES DAILY
Qty: 20 CAPSULE | Refills: 0 | Status: SHIPPED | OUTPATIENT
Start: 2023-02-28 | End: 2023-03-10

## 2023-02-28 RX ORDER — DEXTROAMPHETAMINE/AMPHETAMINE 10 MG
10 CAPSULE, EXT RELEASE 24 HR ORAL DAILY
COMMUNITY
Start: 2022-04-13 | End: 2024-01-28

## 2023-02-28 NOTE — PROGRESS NOTES
Assessment & Plan     Non-recurrent acute suppurative otitis media of left ear without spontaneous rupture of tympanic membrane  Patient presents for follow-up after completing 10 day course Augmentin for acute otitis media and otitis externa of the left year (prescribed by urgent care 2/11/23). Prior pain improved but continues to have muffled hearing. On exam, left ear showed continued infection with significant suppurative fluid visible behind intact TM. Unable to appreciate any landmarks. No significant erythema present. Clinical scenario consistent with continued infection. Will escalate antibiotic treatment at this time. Plan to follow up in approximately 2 weeks to ensure resolution of infection. If not improved at that time will likely refer to ENT for further management.   - cefdinir (OMNICEF) 300 MG capsule; Take 1 capsule (300 mg) by mouth 2 times daily for 10 days        No follow-ups on file.    Elin Saenz MD  [facility] EMILIANO Smith is a 27 year old presenting for the following health issues:  RECHECK (Pt reports they went to urgent care for an ear infection 2/11/23/Pt reports not being able to hear very well but less pressure in ear. Pt took and finished antibiotics and drops)      HPI   Patient recently went to Urgent Care on 2/11/23 for 4 days of left ear pain and pressure, not preceded by and URI but recently was on a plane. Exam at  showed left TM erythematous and bulging with effusion but not perforated, inflamed/irritated left ear canal; right TM dull but non-erythematous and non-bulging. Was diagnosed with acute otitis media and externa and sent home with amoxicillin-clavulanate and ofloxacin otic solution. Here for follow-up ear check.     Since discharge, has completed course of antibiotics and ear drops for 10days. Pain has resolved since and dullness has improved but is still present.       Review of Systems   Constitutional, HEENT, cardiovascular,  "pulmonary, gi and gu systems are negative, except as otherwise noted.      Objective    /87   Pulse 59   Temp 97.7  F (36.5  C) (Oral)   Ht 1.77 m (5' 9.69\")   Wt 95.1 kg (209 lb 9.6 oz)   SpO2 97%   BMI 30.35 kg/m    Body mass index is 30.35 kg/m .  Physical Exam   GENERAL: healthy, alert and no distress  NECK: no pre-auricular or postauricular swelling or tenderness   HEENT:    Left eat: cloudy white exudate, bulging but intact TM   Right ear: grey translucent, normal intact TM   RESP: lungs clear to auscultation  CV: regular rate and rhythm        "

## 2023-03-15 ENCOUNTER — OFFICE VISIT (OUTPATIENT)
Dept: FAMILY MEDICINE | Facility: CLINIC | Age: 28
End: 2023-03-15
Payer: COMMERCIAL

## 2023-03-15 VITALS
SYSTOLIC BLOOD PRESSURE: 130 MMHG | DIASTOLIC BLOOD PRESSURE: 93 MMHG | BODY MASS INDEX: 32.05 KG/M2 | WEIGHT: 211.5 LBS | HEIGHT: 68 IN | OXYGEN SATURATION: 100 % | RESPIRATION RATE: 19 BRPM | HEART RATE: 57 BPM

## 2023-03-15 DIAGNOSIS — H66.002 NON-RECURRENT ACUTE SUPPURATIVE OTITIS MEDIA OF LEFT EAR WITHOUT SPONTANEOUS RUPTURE OF TYMPANIC MEMBRANE: Primary | ICD-10-CM

## 2023-03-15 PROCEDURE — 99214 OFFICE O/P EST MOD 30 MIN: CPT | Mod: GC | Performed by: STUDENT IN AN ORGANIZED HEALTH CARE EDUCATION/TRAINING PROGRAM

## 2023-03-15 RX ORDER — LEVOFLOXACIN 500 MG/1
500 TABLET, FILM COATED ORAL DAILY
Qty: 10 TABLET | Refills: 0 | Status: SHIPPED | OUTPATIENT
Start: 2023-03-15 | End: 2024-01-28

## 2023-03-15 ASSESSMENT — PATIENT HEALTH QUESTIONNAIRE - PHQ9: SUM OF ALL RESPONSES TO PHQ QUESTIONS 1-9: 7

## 2023-03-15 NOTE — PROGRESS NOTES
Assessment & Plan     Non-recurrent acute suppurative otitis media of left ear without spontaneous rupture of tympanic membrane  Patient with significant purulence obstructing view of the tympanic membrane in the ear canal today.  My suspicion for mastoiditis is very low given exam and lack of systemic symptoms.  Did consider the possibility that this is a malignant otitis externa however patient does not have any significant pain that I would expect and unable to fully appreciate significant granulation tissue due to the amount of discharge.  It is possible that this discharge is obstructing a retained foreign body however I do not feel comfortable with albuterol to fully visualize the area to irrigate or curette today.  It is also possible that this is an ongoing very stubborn draining otitis media.  Priority referral for patient to be seen by ENT within 1 to 2 weeks sent today and we will trial 1 more course of levofloxacin which would cover theoretically for all of the above.  Reviewed ED return precautions with patient with good understanding.  - Adult ENT  Referral  - levofloxacin (LEVAQUIN) 500 MG tablet  Dispense: 10 tablet; Refill: 0      No follow-ups on file.    Duran Goldberg MD  [facility] EMILIANO Smith is a 27 year old, presenting for the following health issues:  RECHECK (Ear ache. Nothing much is better. Patient states the they get about 30secs worth of clarity before the left ear clogs back up)      HPI   Patient received 10-day course of Augmentin from urgent care 1 month ago and then another 10-day course of cefdinir from our clinic with minimal improvement of the ear symptoms.  Pain is not too bad however it is more so the clogged nature of this and muffled hearing.  No other fevers chills or other systemic symptoms on review of systems today.    Review of Systems   Constitutional, HEENT, cardiovascular, pulmonary, gi and gu systems are negative, except  "as otherwise noted.      Objective    BP (!) 130/93   Pulse 57   Resp 19   Ht 1.727 m (5' 8\")   Wt 95.9 kg (211 lb 8 oz)   SpO2 100%   BMI 32.16 kg/m    Body mass index is 32.16 kg/m .  Physical Exam  Vitals reviewed.   Constitutional:       General: He is not in acute distress.     Appearance: Normal appearance. He is not ill-appearing.   HENT:      Head: Normocephalic and atraumatic.      Ears:      Comments: Right ear canal mildly red TM otherwise normal.    Left ear canal significantly obstructed with pruritic material.  Unable to visualize TM.  No erythema or tenderness to palpation of the mastoid, pinna and no pain when tugging on the ear.  Eyes:      Conjunctiva/sclera: Conjunctivae normal.   Pulmonary:      Effort: Pulmonary effort is normal. No respiratory distress.   Musculoskeletal:         General: No deformity. Normal range of motion.   Skin:     General: Skin is warm and dry.   Neurological:      General: No focal deficit present.      Mental Status: He is alert.      Motor: No weakness.      Gait: Gait normal.   Psychiatric:         Behavior: Behavior normal.         Thought Content: Thought content normal.              "

## 2023-03-18 ENCOUNTER — MYC MEDICAL ADVICE (OUTPATIENT)
Dept: FAMILY MEDICINE | Facility: CLINIC | Age: 28
End: 2023-03-18
Payer: COMMERCIAL

## 2023-03-28 NOTE — TELEPHONE ENCOUNTER
Julisa Smith,  I left a voicemail for you regarding your ENT referral.  I can forward the referral to a provider outside of . You stated your insurance gave you some in-network options. Let me know where you would like me to send referral.    Best~  Elisabeth Joyner  Care Coordinator  [facility]  Phone:[phone]

## 2023-05-08 ENCOUNTER — DOCUMENTATION ONLY (OUTPATIENT)
Dept: LAB | Facility: CLINIC | Age: 28
End: 2023-05-08
Payer: COMMERCIAL

## 2023-05-08 DIAGNOSIS — Z83.42 FAMILY HISTORY OF HYPERCHOLESTEROLEMIA: ICD-10-CM

## 2023-05-08 DIAGNOSIS — E78.2 MIXED HYPERLIPIDEMIA: Primary | ICD-10-CM

## 2023-05-08 NOTE — PROGRESS NOTES
Luis Epstein has a lab appointment tomorrow.        Future Appointments   Date Time Provider Department Center   5/9/2023  7:30 AM [facility]   5/24/2023  3:45 PM Gary Coy MD [facility]   5/30/2023 10:40 [facility]       The appointment note says: Lab    There is no Lab order available.      Please review and place future orders as appropriate.  If no Lab order will be placed, please advise patient.      Also for consideration: PO    Health Maintenance Due   Topic     ANNUAL REVIEW OF HM ORDERS        Thanks,    Jami Verma

## 2023-05-20 ENCOUNTER — LAB (OUTPATIENT)
Dept: LAB | Facility: CLINIC | Age: 28
End: 2023-05-20

## 2023-05-20 DIAGNOSIS — E78.2 MIXED HYPERLIPIDEMIA: ICD-10-CM

## 2023-05-20 DIAGNOSIS — Z83.42 FAMILY HISTORY OF HYPERCHOLESTEROLEMIA: ICD-10-CM

## 2023-05-20 LAB
ALBUMIN SERPL BCG-MCNC: 4.4 G/DL (ref 3.5–5.2)
ALP SERPL-CCNC: 90 U/L (ref 35–129)
ALT SERPL W P-5'-P-CCNC: 48 U/L (ref 10–50)
ANION GAP SERPL CALCULATED.3IONS-SCNC: 10 MMOL/L (ref 7–15)
AST SERPL W P-5'-P-CCNC: 34 U/L (ref 10–50)
BILIRUB SERPL-MCNC: 0.4 MG/DL
BUN SERPL-MCNC: 10.4 MG/DL (ref 6–20)
CALCIUM SERPL-MCNC: 9.3 MG/DL (ref 8.6–10)
CHLORIDE SERPL-SCNC: 106 MMOL/L (ref 98–107)
CHOLEST SERPL-MCNC: 166 MG/DL
CREAT SERPL-MCNC: 1.18 MG/DL (ref 0.51–1.17)
DEPRECATED HCO3 PLAS-SCNC: 24 MMOL/L (ref 22–29)
GFR SERPL CREATININE-BSD FRML MDRD: 87 ML/MIN/1.73M2
GLUCOSE SERPL-MCNC: 97 MG/DL (ref 70–99)
HDLC SERPL-MCNC: 41 MG/DL
LDLC SERPL CALC-MCNC: 87 MG/DL
NONHDLC SERPL-MCNC: 125 MG/DL
POTASSIUM SERPL-SCNC: 4.2 MMOL/L (ref 3.4–5.3)
PROT SERPL-MCNC: 6.8 G/DL (ref 6.4–8.3)
SODIUM SERPL-SCNC: 140 MMOL/L (ref 136–145)
TRIGL SERPL-MCNC: 190 MG/DL

## 2023-05-20 PROCEDURE — 36415 COLL VENOUS BLD VENIPUNCTURE: CPT | Performed by: PATHOLOGY

## 2023-05-20 PROCEDURE — 80061 LIPID PANEL: CPT | Performed by: PATHOLOGY

## 2023-05-20 PROCEDURE — 80053 COMPREHEN METABOLIC PANEL: CPT | Performed by: PATHOLOGY

## 2023-05-24 ENCOUNTER — OFFICE VISIT (OUTPATIENT)
Dept: CARDIOLOGY | Facility: CLINIC | Age: 28
End: 2023-05-24
Attending: INTERNAL MEDICINE
Payer: COMMERCIAL

## 2023-05-24 VITALS
OXYGEN SATURATION: 96 % | HEART RATE: 57 BPM | WEIGHT: 214.6 LBS | SYSTOLIC BLOOD PRESSURE: 119 MMHG | BODY MASS INDEX: 32.52 KG/M2 | DIASTOLIC BLOOD PRESSURE: 81 MMHG | HEIGHT: 68 IN

## 2023-05-24 DIAGNOSIS — E78.2 MIXED HYPERLIPIDEMIA: ICD-10-CM

## 2023-05-24 DIAGNOSIS — Z83.42 FAMILY HISTORY OF HYPERCHOLESTEROLEMIA: ICD-10-CM

## 2023-05-24 PROCEDURE — 99214 OFFICE O/P EST MOD 30 MIN: CPT | Performed by: INTERNAL MEDICINE

## 2023-05-24 RX ORDER — ROSUVASTATIN CALCIUM 20 MG/1
20 TABLET, COATED ORAL DAILY
Qty: 100 TABLET | Refills: 6 | Status: SHIPPED | OUTPATIENT
Start: 2023-05-24 | End: 2024-07-11

## 2023-05-24 NOTE — LETTER
5/24/2023    Jodi Lemon MD  [address] [facility] [address]    RE: Luis Epstein       Dear Colleague,     I had the pleasure of seeing Luis Epstein in the [facility].    SERVICE DATE: 5/24/2023.    PRIMARY CARE PROVIDER:  Jodi Lemon  [address] [facility] [address]    REASON FOR VISIT:  Follow-up of hyperlipidemia.    HISTORY OF PRESENT ILLNESS:  t was my pleasure to visit with Luis Epstein who is a delightful 27-year-old transgender male (on testosterone since 2016) known to have chronic migraines, stable depression (on fluoxetine), ADHD (on Adderall and propranolol), and sleep-disordered breathing.  Luis works as a  at [employer], and lives with his partner.     Luis was originally referred in July 2022 for hyperlipidemia, diagnosed at age 21.  His labs were suggestive of mixed dyslipidemia with elevated total cholesterol of 250-270, HDL in the 40s, a very high LDL of 180-220, and high triglycerides in the 200s.  He has a family history of hyperlipidemia, but no coronary artery disease.    Lifestyle modification reiterated and rosuvastatin 20 mg instituted.  He is tolerating the medication well, has made some changes to his diet but says he could do better, bikes more to work and back but also says this is something he hopes to increase.  Weight is unchanged at 214 pounds, BMI 33.    There has been a significant lowering of LDL to 87 (from 223), HDL is 41, triglycerides are improved at 190 (from 284).  He is not diabetic.  Normal TSH.  Hemoglobin A1c 5.4%, normal liver enzymes.    Blood pressure 119/81, pulse 57    DIAGNOSES/ASSESSMENT:  1.  Mixed dyslipidemia.  LDL optimally treated with rosuvastatin 20 mg daily.  No statin side effects.  Ongoing lifestyle modification with improvement in diet and more exercise reiterated.    PLAN:  1.  I have renewed rosuvastatin 20 mg daily for a year.  Future refills by his primary provider.  2.  Had  "a detailed discussion with Luis.  Reiterated that a balanced, predominantly plant-based diet with cutting back of processed food, sugars and alcohol, in conjunction with daily moderate intensity regular exercise is essential and lipid management.  3.  I wish she is in all the best.  Follow-up with cardiology as needed.      Gary Coy MD    Established patient.   30 minutes spent by me on the date of the encounter doing chart review, history and exam, documentation and further activities per the note.        PHYSICAL EXAMINATION:  Vitals: /81   Pulse 57   Ht 1.727 m (5' 8\")   Wt 97.3 kg (214 lb 9.6 oz)   SpO2 96%   BMI 32.63 kg/m    Wt Readings from Last 5 Encounters:   05/24/23 97.3 kg (214 lb 9.6 oz)   03/15/23 95.9 kg (211 lb 8 oz)   02/28/23 95.1 kg (209 lb 9.6 oz)   02/11/23 94.3 kg (207 lb 14.4 oz)   11/11/22 90.7 kg (200 lb)     CARDIOVASCULAR:  Regular heart sounds.  No murmur. No carotid bruit.  RESPIRATORY:  Normal breath sounds. No rales or wheeze.  EXTREMITIES:  No edema.    Encounter Diagnoses   Name Primary?    Mixed hyperlipidemia     Family history of hypercholesterolemia          No orders of the defined types were placed in this encounter.          CURRENT MEDICATIONS:  Current Outpatient Medications   Medication Sig Dispense Refill    amphetamine-dextroamphetamine (ADDERALL XR) 15 MG 24 hr capsule Take 15 mg by mouth daily      amphetamine-dextroamphetamine (ADDERALL) 10 MG tablet Take 10 mg by mouth daily Take 10 mg by mouth daily in the afternoon when the XR tablet begins to wear off      eletriptan (RELPAX) 20 MG tablet Take 1 tablet (20 mg) by mouth at onset of headache for migraine (repeat in 2 hours if needed) May repeat in 2 hours. Max 4 tablets/24 hours. 18 tablet 11    eletriptan (RELPAX) 20 MG tablet Take 1 tablet (20 mg) by mouth at onset of headache for migraine May repeat in 2 hours. Max 4 tablets/24 hours. 40 tablet 3    FLUoxetine (PROZAC) 40 MG capsule TAKE 2 " "CAPSULES BY MOUTH EVERY  capsule 1    levofloxacin (LEVAQUIN) 500 MG tablet Take 1 tablet (500 mg) by mouth daily 10 tablet 0    needle, disp, 18G X 1\" MISC 1 applicator every 7 days 100 each 0    Needle, Disp, 23G X 1\" MISC 1 applicator every 7 days 100 each 0    prochlorperazine (COMPAZINE) 10 MG tablet Take 1 tablet (10 mg) by mouth every 6 hours as needed for nausea or vomiting (migraine) 10 tablet 11    propranolol (INDERAL) 20 MG tablet Take 4 tablets (80 mg) by mouth 2 times daily 720 tablet 3    rosuvastatin (CRESTOR) 20 MG tablet Take 1 tablet (20 mg) by mouth daily 100 tablet 6    Syringe, Disposable, (SYRINGE LUER LOCK) 3 ML MISC 1 applicator every 7 days 100 each 0    testosterone cypionate (DEPOTESTOSTERONE) 200 MG/ML injection Inject 0.3 mLs (60 mg) into the muscle once a week 4 mL 1    ADDERALL XR 10 MG 24 hr capsule Take 10 mg by mouth daily (Patient not taking: Reported on 5/24/2023)           ALLERGIES:  No Known Allergies    PAST MEDICAL HISTORY:    Past Medical History:   Diagnosis Date    Chronic tonsillitis 7/16/2021    Current moderate episode of major depressive disorder, unspecified whether recurrent (H)     Deviated nasal septum 7/15/2021    status post septoplasty, tonsillectomy on 12/15/2021    Gender dysphoria in adult     Motion sickness     Tonsillar hypertrophy 7/15/2021       PAST SURGICAL HISTORY:    Past Surgical History:   Procedure Laterality Date    MASTECTOMY, BILATERAL  2016    SEPTOPLASTY, TURBINOPLASTY, COMBINED Bilateral 12/16/2021    Procedure: SEPTOPLASTY, NOSE, WITH TURBINOPLASTY;  Surgeon: Sae Knight MD;  Location: [facility] OR    TONSILLECTOMY Bilateral 12/16/2021    Procedure: TONSILLECTOMY;  Surgeon: Sae Knight MD;  Location: [facility] OR       FAMILY HISTORY:    Family History   Problem Relation Age of Onset    Hyperlipidemia Mother     Hypertension Mother     Hyperlipidemia Father     Hypertension Father         Took statin at age 27 years    " Cerebrovascular Disease Paternal Grandfather        SOCIAL HISTORY:    Social History     Socioeconomic History    Marital status:      Spouse name: None    Number of children: None    Years of education: None    Highest education level: None   Occupational History    Occupation: student     Comment: getting masters in education    Occupation: teacher     Comment: [employer]   Tobacco Use    Smoking status: Never    Smokeless tobacco: Never   Vaping Use    Vaping status: Never Used     Passive vaping exposure: Yes   Substance and Sexual Activity    Alcohol use: Yes    Drug use: Never    Sexual activity: Yes     Partners: Male   Other Topics Concern    Parent/sibling w/ CABG, MI or angioplasty before 65F 55M? No       Thank you for allowing me to participate in the care of your patient.      Sincerely,     Gary Coy MD     [facility]  cc:   Gary Coy MD  [address] [place] [id number]

## 2023-05-24 NOTE — PROGRESS NOTES
SERVICE DATE: 5/24/2023.    PRIMARY CARE PROVIDER:  Jodi Lemon  [address] [facility] [address]    REASON FOR VISIT:  Follow-up of hyperlipidemia.    HISTORY OF PRESENT ILLNESS:  t was my pleasure to visit with Luis Epstein who is a delightful 27-year-old transgender male (on testosterone since 2016) known to have chronic migraines, stable depression (on fluoxetine), ADHD (on Adderall and propranolol), and sleep-disordered breathing.  Luis works as a  at [employer], and lives with his partner.     Luis was originally referred in July 2022 for hyperlipidemia, diagnosed at age 21.  His labs were suggestive of mixed dyslipidemia with elevated total cholesterol of 250-270, HDL in the 40s, a very high LDL of 180-220, and high triglycerides in the 200s.  He has a family history of hyperlipidemia, but no coronary artery disease.    Lifestyle modification reiterated and rosuvastatin 20 mg instituted.  He is tolerating the medication well, has made some changes to his diet but says he could do better, bikes more to work and back but also says this is something he hopes to increase.  Weight is unchanged at 214 pounds, BMI 33.    There has been a significant lowering of LDL to 87 (from 223), HDL is 41, triglycerides are improved at 190 (from 284).  He is not diabetic.  Normal TSH.  Hemoglobin A1c 5.4%, normal liver enzymes.    Blood pressure 119/81, pulse 57    DIAGNOSES/ASSESSMENT:  1.  Mixed dyslipidemia.  LDL optimally treated with rosuvastatin 20 mg daily.  No statin side effects.  Ongoing lifestyle modification with improvement in diet and more exercise reiterated.    PLAN:  1.  I have renewed rosuvastatin 20 mg daily for a year.  Future refills by his primary provider.  2.  Had a detailed discussion with Luis.  Reiterated that a balanced, predominantly plant-based diet with cutting back of processed food, sugars and alcohol, in conjunction with daily moderate intensity  "regular exercise is essential and lipid management.  3.  I wish she is in all the best.  Follow-up with cardiology as needed.      Gary Coy MD    Established patient.   30 minutes spent by me on the date of the encounter doing chart review, history and exam, documentation and further activities per the note.        PHYSICAL EXAMINATION:  Vitals: /81   Pulse 57   Ht 1.727 m (5' 8\")   Wt 97.3 kg (214 lb 9.6 oz)   SpO2 96%   BMI 32.63 kg/m    Wt Readings from Last 5 Encounters:   05/24/23 97.3 kg (214 lb 9.6 oz)   03/15/23 95.9 kg (211 lb 8 oz)   02/28/23 95.1 kg (209 lb 9.6 oz)   02/11/23 94.3 kg (207 lb 14.4 oz)   11/11/22 90.7 kg (200 lb)     CARDIOVASCULAR:  Regular heart sounds.  No murmur. No carotid bruit.  RESPIRATORY:  Normal breath sounds. No rales or wheeze.  EXTREMITIES:  No edema.    Encounter Diagnoses   Name Primary?     Mixed hyperlipidemia      Family history of hypercholesterolemia          No orders of the defined types were placed in this encounter.          CURRENT MEDICATIONS:  Current Outpatient Medications   Medication Sig Dispense Refill     amphetamine-dextroamphetamine (ADDERALL XR) 15 MG 24 hr capsule Take 15 mg by mouth daily       amphetamine-dextroamphetamine (ADDERALL) 10 MG tablet Take 10 mg by mouth daily Take 10 mg by mouth daily in the afternoon when the XR tablet begins to wear off       eletriptan (RELPAX) 20 MG tablet Take 1 tablet (20 mg) by mouth at onset of headache for migraine (repeat in 2 hours if needed) May repeat in 2 hours. Max 4 tablets/24 hours. 18 tablet 11     eletriptan (RELPAX) 20 MG tablet Take 1 tablet (20 mg) by mouth at onset of headache for migraine May repeat in 2 hours. Max 4 tablets/24 hours. 40 tablet 3     FLUoxetine (PROZAC) 40 MG capsule TAKE 2 CAPSULES BY MOUTH EVERY  capsule 1     levofloxacin (LEVAQUIN) 500 MG tablet Take 1 tablet (500 mg) by mouth daily 10 tablet 0     needle, disp, 18G X 1\" MISC 1 applicator every 7 " "days 100 each 0     Needle, Disp, 23G X 1\" MISC 1 applicator every 7 days 100 each 0     prochlorperazine (COMPAZINE) 10 MG tablet Take 1 tablet (10 mg) by mouth every 6 hours as needed for nausea or vomiting (migraine) 10 tablet 11     propranolol (INDERAL) 20 MG tablet Take 4 tablets (80 mg) by mouth 2 times daily 720 tablet 3     rosuvastatin (CRESTOR) 20 MG tablet Take 1 tablet (20 mg) by mouth daily 100 tablet 6     Syringe, Disposable, (SYRINGE LUER LOCK) 3 ML MISC 1 applicator every 7 days 100 each 0     testosterone cypionate (DEPOTESTOSTERONE) 200 MG/ML injection Inject 0.3 mLs (60 mg) into the muscle once a week 4 mL 1     ADDERALL XR 10 MG 24 hr capsule Take 10 mg by mouth daily (Patient not taking: Reported on 5/24/2023)           ALLERGIES:  No Known Allergies    PAST MEDICAL HISTORY:    Past Medical History:   Diagnosis Date     Chronic tonsillitis 7/16/2021     Current moderate episode of major depressive disorder, unspecified whether recurrent (H)      Deviated nasal septum 7/15/2021    status post septoplasty, tonsillectomy on 12/15/2021     Gender dysphoria in adult      Motion sickness      Tonsillar hypertrophy 7/15/2021       PAST SURGICAL HISTORY:    Past Surgical History:   Procedure Laterality Date     MASTECTOMY, BILATERAL  2016     SEPTOPLASTY, TURBINOPLASTY, COMBINED Bilateral 12/16/2021    Procedure: SEPTOPLASTY, NOSE, WITH TURBINOPLASTY;  Surgeon: Sae Knight MD;  Location: [facility] OR     TONSILLECTOMY Bilateral 12/16/2021    Procedure: TONSILLECTOMY;  Surgeon: Sae Knight MD;  Location:  OR       FAMILY HISTORY:    Family History   Problem Relation Age of Onset     Hyperlipidemia Mother      Hypertension Mother      Hyperlipidemia Father      Hypertension Father         Took statin at age 28 years     Cerebrovascular Disease Paternal Grandfather        SOCIAL HISTORY:    Social History     Socioeconomic History     Marital status:      Spouse name: None     " Number of children: None     Years of education: None     Highest education level: None   Occupational History     Occupation: student     Comment: getting masters in education     Occupation: teacher     Comment: [employer]   Tobacco Use     Smoking status: Never     Smokeless tobacco: Never   Vaping Use     Vaping status: Never Used     Passive vaping exposure: Yes   Substance and Sexual Activity     Alcohol use: Yes     Drug use: Never     Sexual activity: Yes     Partners: Male   Other Topics Concern     Parent/sibling w/ CABG, MI or angioplasty before 65F 55M? No

## 2023-06-04 ENCOUNTER — HEALTH MAINTENANCE LETTER (OUTPATIENT)
Age: 28
End: 2023-06-04

## 2023-06-19 ASSESSMENT — ENCOUNTER SYMPTOMS
FREQUENCY: 0
HEMATOCHEZIA: 0
CHILLS: 0
EYE PAIN: 0
ABDOMINAL PAIN: 0
COUGH: 0
HEARTBURN: 0
PALPITATIONS: 0
CONSTIPATION: 0
ARTHRALGIAS: 1
DYSURIA: 0
BREAST MASS: 0
HEADACHES: 0
NERVOUS/ANXIOUS: 0
HEMATURIA: 0
PARESTHESIAS: 0
WEAKNESS: 0
SORE THROAT: 0
DIARRHEA: 0
MYALGIAS: 0
DIZZINESS: 0
JOINT SWELLING: 0
SHORTNESS OF BREATH: 0
NAUSEA: 0
FEVER: 0

## 2023-06-23 ENCOUNTER — OFFICE VISIT (OUTPATIENT)
Dept: FAMILY MEDICINE | Facility: CLINIC | Age: 28
End: 2023-06-23
Payer: COMMERCIAL

## 2023-06-23 VITALS
HEIGHT: 68 IN | WEIGHT: 212.3 LBS | BODY MASS INDEX: 32.18 KG/M2 | TEMPERATURE: 97.9 F | SYSTOLIC BLOOD PRESSURE: 116 MMHG | OXYGEN SATURATION: 96 % | HEART RATE: 59 BPM | DIASTOLIC BLOOD PRESSURE: 75 MMHG

## 2023-06-23 DIAGNOSIS — F64.0 GENDER DYSPHORIA IN ADULT: ICD-10-CM

## 2023-06-23 DIAGNOSIS — Z00.00 ROUTINE GENERAL MEDICAL EXAMINATION AT A HEALTH CARE FACILITY: Primary | ICD-10-CM

## 2023-06-23 PROBLEM — J32.0 CHRONIC RIGHT MAXILLARY SINUSITIS: Status: RESOLVED | Noted: 2021-11-09 | Resolved: 2023-06-23

## 2023-06-23 LAB — HBA1C MFR BLD: 5.2 % (ref 0–5.6)

## 2023-06-23 PROCEDURE — 80048 BASIC METABOLIC PNL TOTAL CA: CPT | Performed by: STUDENT IN AN ORGANIZED HEALTH CARE EDUCATION/TRAINING PROGRAM

## 2023-06-23 PROCEDURE — 36415 COLL VENOUS BLD VENIPUNCTURE: CPT | Performed by: STUDENT IN AN ORGANIZED HEALTH CARE EDUCATION/TRAINING PROGRAM

## 2023-06-23 PROCEDURE — 99395 PREV VISIT EST AGE 18-39: CPT | Mod: GC | Performed by: STUDENT IN AN ORGANIZED HEALTH CARE EDUCATION/TRAINING PROGRAM

## 2023-06-23 PROCEDURE — 83036 HEMOGLOBIN GLYCOSYLATED A1C: CPT | Performed by: STUDENT IN AN ORGANIZED HEALTH CARE EDUCATION/TRAINING PROGRAM

## 2023-06-23 RX ORDER — TESTOSTERONE CYPIONATE 200 MG/ML
60 INJECTION, SOLUTION INTRAMUSCULAR WEEKLY
Qty: 4 ML | Refills: 3 | Status: SHIPPED | OUTPATIENT
Start: 2023-06-23 | End: 2024-01-28

## 2023-06-23 ASSESSMENT — ENCOUNTER SYMPTOMS
DIARRHEA: 0
NAUSEA: 0
WEAKNESS: 0
CONSTIPATION: 0
ABDOMINAL PAIN: 0
DIZZINESS: 0
ARTHRALGIAS: 1
MYALGIAS: 0
DYSURIA: 0
EYE PAIN: 0
PALPITATIONS: 0
COUGH: 0
SORE THROAT: 0
SHORTNESS OF BREATH: 0
FEVER: 0
HEADACHES: 0
FREQUENCY: 0
JOINT SWELLING: 0
HEMATURIA: 0
CHILLS: 0
NERVOUS/ANXIOUS: 0

## 2023-06-23 NOTE — PATIENT INSTRUCTIONS
Try to drink more water.     We'll follow up on mychart about your results.       Preventive Health Recommendations  Ages 26 - 39    Yearly exam:             See your health care provider every year in order to  o   Review health changes.   o   Discuss preventive care.    o   Review your medicines if your doctor has prescribed any.  You should be tested each year for STDs (sexually transmitted diseases), if you re at risk.   If you are at risk for diabetes, you should have a diabetes test (fasting glucose).  Shots: Get a flu shot each year. Get a tetanus shot every 10 years.     Nutrition:  Eat at least 5 servings of fruits and vegetables daily.   Eat whole-grain bread, whole-wheat pasta and brown rice instead of white grains and rice.   Get adequate Calcium and Vitamin D.     Lifestyle  Exercise for at least 150 minutes a week (30 minutes a day, 5 days a week). This will help you control your weight and prevent disease.   Limit alcohol to one drink per day.   No smoking.   Wear sunscreen to prevent skin cancer.   See your dentist every six months for an exam and cleaning.

## 2023-06-23 NOTE — PROGRESS NOTES
SUBJECTIVE:   CC: Luis is an 27 year old who presents for preventive health visit.       6/23/2023     2:26 PM   Additional Questions   Roomed by sofie hubbard   Accompanied by self         6/23/2023     2:26 PM   Patient Reported Additional Medications   Patient reports taking the following new medications No new meds reported     Healthy Habits:     Getting at least 3 servings of Calcium per day:  Yes    Bi-annual eye exam:  Yes    Dental care twice a year:  Yes    Sleep apnea or symptoms of sleep apnea:  Daytime drowsiness    Diet:  Regular (no restrictions)    Frequency of exercise:  4-5 days/week    Duration of exercise:  30-45 minutes    Taking medications regularly:  Yes    Medication side effects:  None    PHQ-2 Total Score: 2    Additional concerns today:  No    HPI:  - tired during day  - more migraines with the heat  - has air conditioning, on summer break  - sleeping in baseline guest room      Today's PHQ-2 Score:       6/23/2023     2:31 PM   PHQ-2 ( 1999 [employer])   Q1: Little interest or pleasure in doing things 1   Q2: Feeling down, depressed or hopeless 1   PHQ-2 Score 2       Have you ever done Advance Care Planning? (For example, a Health Directive, POLST, or a discussion with a medical provider or your loved ones about your wishes): No, advance care planning information given to patient to review.  Patient plans to discuss their wishes with loved ones or provider.      Social History     Tobacco Use     Smoking status: Never     Smokeless tobacco: Never   Substance Use Topics     Alcohol use: Yes             6/19/2023    11:07 AM   Alcohol Use   Prescreen: >3 drinks/day or >7 drinks/week? Yes   AUDIT SCORE  8         6/19/2023    11:07 AM   AUDIT - Alcohol Use Disorders Identification Test - Reproduced from the World Health Organization Audit 2001 (Second Edition)   1.  How often do you have a drink containing alcohol? 4 or more times a week   2.  How many drinks containing alcohol do  you have on a typical day when you are drinking? 1 or 2   3.  How often do you have five or more drinks on one occasion? Monthly   4.  How often during the last year have you found that you were not able to stop drinking once you had started? Never   5.  How often during the last year have you failed to do what was normally expected of you because of drinking? Less than monthly   6.  How often during the last year have you needed a first drink in the morning to get yourself going after a heavy drinking session? Never   7.  How often during the last year have you had a feeling of guilt or remorse after drinking? Less than monthly   8.  How often during the last year have you been unable to remember what happened the night before because of your drinking? Never   9.  Have you or someone else been injured because of your drinking? No   10. Has a relative, friend, doctor or other health care worker been concerned about your drinking or suggested you cut down? No   TOTAL SCORE 8        Hard cider: about 5 days per week  - and then on weekend day 2-3 drink on a weekend     Reviewed orders with patient.  Reviewed health maintenance and updated orders accordingly -     SH:  - spouse - Sajan. Getting bottom surgery in August - 2 dogs  - no sperm  - maybe kids in a couple year, possible interest in carrying pregnancy        History of abnormal Pap smear: no      11/24/2021    10:58 AM   PAP / HPV   PAP Negative for Intraepithelial Lesion or Malignancy (NILM)      Reviewed and updated as needed this visit by clinical staff   Tobacco  Allergies  Meds  Problems  Med Hx  Surg Hx  Fam Hx          Reviewed and updated as needed this visit by Provider   Tobacco  Allergies  Meds  Problems  Med Hx  Surg Hx  Fam Hx             Review of Systems   Constitutional: Negative for chills and fever.   HENT: Positive for ear pain. Negative for congestion, hearing loss and sore throat.    Eyes: Negative for pain and visual  "disturbance.   Respiratory: Negative for cough and shortness of breath.    Cardiovascular: Negative for chest pain and palpitations.   Gastrointestinal: Negative for abdominal pain, constipation, diarrhea and nausea.   Genitourinary: Negative for dysuria, frequency, genital sores, hematuria and urgency.   Musculoskeletal: Positive for arthralgias. Negative for joint swelling and myalgias.   Skin: Negative for rash.   Neurological: Negative for dizziness, weakness and headaches.   Psychiatric/Behavioral: The patient is not nervous/anxious.         OBJECTIVE:   /75   Pulse 59   Temp 97.9  F (36.6  C) (Oral)   Ht 1.727 m (5' 8\")   Wt 96.3 kg (212 lb 4.8 oz)   SpO2 96%   BMI 32.28 kg/m    Physical Exam  Constitutional:       Appearance: Normal appearance.   HENT:      Head: Normocephalic.      Right Ear: Tympanic membrane and ear canal normal.      Left Ear: Tympanic membrane and ear canal normal.      Nose: No congestion or rhinorrhea.      Mouth/Throat:      Mouth: Mucous membranes are moist.      Pharynx: No oropharyngeal exudate or posterior oropharyngeal erythema.   Eyes:      Conjunctiva/sclera: Conjunctivae normal.   Neck:      Comments: No thyromegaly  Cardiovascular:      Rate and Rhythm: Normal rate and regular rhythm.      Heart sounds: Normal heart sounds.   Pulmonary:      Effort: Pulmonary effort is normal.      Breath sounds: Normal breath sounds.   Musculoskeletal:      Right lower leg: No edema.      Left lower leg: No edema.   Lymphadenopathy:      Cervical: No cervical adenopathy.   Skin:     General: Skin is warm and dry.   Neurological:      Mental Status: He is alert.   Psychiatric:         Mood and Affect: Mood normal.         Behavior: Behavior normal.         Thought Content: Thought content normal.         Judgment: Judgment normal.      Comments: Anxious-appearing         ASSESSMENT/PLAN:   Luis was seen today for physical.    Diagnoses and all orders for this visit:    Routine " "general medical examination at a health care facility    Gender dysphoria in adult  Stable on IM testosterone. Annual labs, next due 01/2024.  -     needle, disp, 18G X 1\" MISC; 1 applicator every 7 days  -     Needle, Disp, 23G X 1\" MISC; 1 applicator every 7 days  -     Syringe, Disposable, (SYRINGE LUER LOCK) 3 ML MISC; 1 applicator every 7 days  -     testosterone cypionate (DEPOTESTOSTERONE) 200 MG/ML injection; Inject 0.3 mLs (60 mg) into the muscle once a week    Creatinine elevation  Chronic mild elevation in creatinine. No known HTN or diabetes. Advised on increased hydration. Repeat BMP today. Check A1c although low concern for diabetes based on normal glucoses on metabolic panels. Consider urine protein creatinine ratio check in future.   -     Basic metabolic panel; Future  -     Hemoglobin A1c; Future      Patient has been advised of split billing requirements and indicates understanding: Yes      COUNSELING:  Reviewed preventive health counseling, as reflected in patient instructions      He reports that he has never smoked. He has never used smokeless tobacco.          Jodi Lemon MD  [facility]      " DISPLAY PLAN FREE TEXT DISPLAY PLAN FREE TEXT DISPLAY PLAN FREE TEXT DISPLAY PLAN FREE TEXT DISPLAY PLAN FREE TEXT DISPLAY PLAN FREE TEXT DISPLAY PLAN FREE TEXT DISPLAY PLAN FREE TEXT DISPLAY PLAN FREE TEXT DISPLAY PLAN FREE TEXT DISPLAY PLAN FREE TEXT DISPLAY PLAN FREE TEXT DISPLAY PLAN FREE TEXT DISPLAY PLAN FREE TEXT DISPLAY PLAN FREE TEXT DISPLAY PLAN FREE TEXT

## 2023-06-23 NOTE — PROGRESS NOTES
Preceptor Attestation:  Patient seen and evaluated in person. I discussed the patient with the resident. I have verified the content of the note, which accurately reflects my assessment of the patient and the plan of care.   Supervising Physician:  Felicitas Womack DO

## 2023-06-24 LAB
ANION GAP SERPL CALCULATED.3IONS-SCNC: 13 MMOL/L (ref 7–15)
BUN SERPL-MCNC: 16.5 MG/DL (ref 6–20)
CALCIUM SERPL-MCNC: 9.1 MG/DL (ref 8.6–10)
CHLORIDE SERPL-SCNC: 106 MMOL/L (ref 98–107)
CREAT SERPL-MCNC: 1.19 MG/DL (ref 0.51–1.17)
DEPRECATED HCO3 PLAS-SCNC: 24 MMOL/L (ref 22–29)
GFR SERPL CREATININE-BSD FRML MDRD: 86 ML/MIN/1.73M2
GLUCOSE SERPL-MCNC: 82 MG/DL (ref 70–99)
POTASSIUM SERPL-SCNC: 4.1 MMOL/L (ref 3.4–5.3)
SODIUM SERPL-SCNC: 143 MMOL/L (ref 136–145)

## 2023-08-11 NOTE — PROGRESS NOTES
[facility]    Outpatient Physical Therapy Discharge Note  Patient: Luis Epstein  : 1995    Patient was seen for 8 visits for knee and shoulder pain from 22 to 9/15/22 with no follow up appointments.      Plan:  Discharge from therapy.     Reason for Discharge: No further expectation of progress.  Patient has failed to schedule further appointments.    Discharge Plan: Patient to continue home program.     If patient would like to return to therapy, they will need a new PT order from referring provider.    Diana Shah, PT   2023

## 2023-10-20 DIAGNOSIS — G43.909 MIGRAINE WITHOUT STATUS MIGRAINOSUS, NOT INTRACTABLE, UNSPECIFIED MIGRAINE TYPE: ICD-10-CM

## 2023-10-20 NOTE — TELEPHONE ENCOUNTER
"Request for medication refill:  eletriptan (RELPAX) 20 MG tablet     Providers if patient needs an appointment and you are willing to give a one month supply please refill for one month and  send a letter/MyChart using \".SMILLIMITEDREFILL\" .smillimited and route chart to \"[facility] \" (Giving one month refill in non controlled medications is strongly recommended before denial)    If refill has been denied, meaning absolutely no refills without visit, please complete the smart phrase \".smirxrefuse\" and route it to the \"[facility] MED REFILLS\"  pool to inform the patient and the pharmacy.    Aleta Patrick, [facility]      "

## 2023-10-23 RX ORDER — ELETRIPTAN HYDROBROMIDE 20 MG/1
TABLET, FILM COATED ORAL
Qty: 40 TABLET | Refills: 3 | Status: SHIPPED | OUTPATIENT
Start: 2023-10-23 | End: 2024-01-09

## 2023-11-22 ENCOUNTER — TELEPHONE (OUTPATIENT)
Dept: CARDIOLOGY | Facility: CLINIC | Age: 28
End: 2023-11-22
Payer: COMMERCIAL

## 2023-11-22 NOTE — TELEPHONE ENCOUNTER
[facility] Call Center    Phone Message    May a detailed message be left on voicemail: yes     Reason for Call: Medication Refill Request    Has the patient contacted the pharmacy for the refill? Yes   Name of medication being requested: rosuvastatin (CRESTOR) 20 MG tablet   Provider who prescribed the medication: Dr Coy   [facility] [address] [facility] [place] [address]   Date medication is needed: ASAP      Action Taken: Other: Cardiology    Travel Screening: Not Applicable     Thank you!  [facility]                                                                    How Severe Is Your Rash?: mild Is This A New Presentation, Or A Follow-Up?: Follow Up Rash Additional History: He would like refills for the shampoo to treat this recurring rash.

## 2023-11-22 NOTE — TELEPHONE ENCOUNTER
I called Luis and asked him to call me back. His message says to send a refill to [facility] ASAP. I asked him if he can call me and let me know if he needs it right now? I also told him Dr. Coy said in her note that future refills should go to his primary provider. He was referred back to his primary. I told him I would hang on to this request until I hear back from him.    11/28/23 Addendum:   I have not received a call back from Luis . I am closing this encounter.

## 2024-01-09 DIAGNOSIS — G43.909 MIGRAINE WITHOUT STATUS MIGRAINOSUS, NOT INTRACTABLE, UNSPECIFIED MIGRAINE TYPE: ICD-10-CM

## 2024-01-09 NOTE — TELEPHONE ENCOUNTER
"Request for medication refill:  eletriptan (RELPAX) 20 MG tablet     Providers if patient needs an appointment and you are willing to give a one month supply please refill for one month and  send a letter/MyChart using \".SMILLIMITEDREFILL\" .smillimited and route chart to \"[facility] \" (Giving one month refill in non controlled medications is strongly recommended before denial)    If refill has been denied, meaning absolutely no refills without visit, please complete the smart phrase \".smirxrefuse\" and route it to the \"P [facility] MED REFILLS\"  pool to inform the patient and the pharmacy.    Aleta Patrick, [facility]      "

## 2024-01-11 RX ORDER — ELETRIPTAN HYDROBROMIDE 20 MG/1
TABLET, FILM COATED ORAL
Qty: 40 TABLET | Refills: 3 | Status: SHIPPED | OUTPATIENT
Start: 2024-01-11 | End: 2024-02-15

## 2024-01-14 DIAGNOSIS — F32.1 CURRENT MODERATE EPISODE OF MAJOR DEPRESSIVE DISORDER, UNSPECIFIED WHETHER RECURRENT (H): ICD-10-CM

## 2024-01-15 RX ORDER — FLUOXETINE 40 MG/1
CAPSULE ORAL
Qty: 180 CAPSULE | Refills: 1 | Status: SHIPPED | OUTPATIENT
Start: 2024-01-15 | End: 2024-07-09

## 2024-01-15 NOTE — TELEPHONE ENCOUNTER

## 2024-01-28 DIAGNOSIS — F64.0 GENDER DYSPHORIA IN ADULT: ICD-10-CM

## 2024-01-28 DIAGNOSIS — F64.0 GENDER DYSPHORIA IN ADULT: Primary | Chronic | ICD-10-CM

## 2024-01-28 RX ORDER — TESTOSTERONE CYPIONATE 200 MG/ML
60 INJECTION, SOLUTION INTRAMUSCULAR WEEKLY
Qty: 4 ML | Refills: 1 | Status: SHIPPED | OUTPATIENT
Start: 2024-01-28 | End: 2024-04-23

## 2024-02-13 ASSESSMENT — MIGRAINE DISABILITY ASSESSMENT (MIDAS)
HOW OFTEN WERE SOCIAL ACTIVITIES MISSED DUE TO HEADACHES: 2
HOW MANY DAYS DID YOU MISS WORK OR SCHOOL BECAUSE OF HEADACHES: 3
HOW MANY DAYS IN THE PAST 3 MONTHS HAVE YOU HAD A HEADACHE: 10
TOTAL SCORE: 20
HOW MANY DAYS WAS YOUR PRODUCTIVITY CUT IN HALF BECAUSE OF HEADACHES: 5
HOW MANY DAYS WAS HOUSEWORK PRODUCTIVITY CUT IN HALF DUE TO HEADACHES: 5
HOW MANY DAYS DID YOU NOT DO HOUSEWORK BECAUSE OF HEADACHES: 5
ON A SCALE FROM 0-10 ON AVERAGE HOW PAINFUL WERE HEADACHES: 6

## 2024-02-13 ASSESSMENT — HEADACHE IMPACT TEST (HIT 6)
WHEN YOU HAVE HEADACHES HOW OFTEN IS THE PAIN SEVERE: VERY OFTEN
HOW OFTEN DID HEADACHS LIMIT CONCENTRATION ON WORK OR DAILY ACTIVITY: SOMETIMES
HOW OFTEN HAVE YOU FELT FED UP OR IRRITATED BECAUSE OF YOUR HEADACHES: SOMETIMES
HOW OFTEN DO HEADACHES LIMIT YOUR DAILY ACTIVITIES: VERY OFTEN
HOW OFTEN HAVE YOU FELT TOO TIRED TO WORK BECAUSE OF YOUR HEADACHES: SOMETIMES
WHEN YOU HAVE A HEADACHE HOW OFTEN DO YOU WISH YOU COULD LIE DOWN: ALWAYS
HIT6 TOTAL SCORE: 65

## 2024-02-14 ENCOUNTER — TELEPHONE (OUTPATIENT)
Dept: NEUROLOGY | Facility: CLINIC | Age: 29
End: 2024-02-14
Payer: COMMERCIAL

## 2024-02-15 ENCOUNTER — TELEPHONE (OUTPATIENT)
Dept: NEUROLOGY | Facility: CLINIC | Age: 29
End: 2024-02-15

## 2024-02-15 ENCOUNTER — VIRTUAL VISIT (OUTPATIENT)
Dept: NEUROLOGY | Facility: CLINIC | Age: 29
End: 2024-02-15
Payer: COMMERCIAL

## 2024-02-15 VITALS — BODY MASS INDEX: 28.79 KG/M2 | WEIGHT: 190 LBS | HEIGHT: 68 IN

## 2024-02-15 DIAGNOSIS — G43.909 MIGRAINE WITHOUT STATUS MIGRAINOSUS, NOT INTRACTABLE, UNSPECIFIED MIGRAINE TYPE: ICD-10-CM

## 2024-02-15 PROCEDURE — 99214 OFFICE O/P EST MOD 30 MIN: CPT | Mod: 95 | Performed by: PSYCHIATRY & NEUROLOGY

## 2024-02-15 PROCEDURE — G2211 COMPLEX E/M VISIT ADD ON: HCPCS | Performed by: PSYCHIATRY & NEUROLOGY

## 2024-02-15 RX ORDER — PROPRANOLOL HYDROCHLORIDE 40 MG/1
40 TABLET ORAL 2 TIMES DAILY
Qty: 180 TABLET | Refills: 3 | Status: SHIPPED | OUTPATIENT
Start: 2024-02-15

## 2024-02-15 RX ORDER — PROCHLORPERAZINE MALEATE 10 MG
10 TABLET ORAL EVERY 6 HOURS PRN
Qty: 10 TABLET | Refills: 11 | Status: SHIPPED | OUTPATIENT
Start: 2024-02-15 | End: 2024-05-15

## 2024-02-15 RX ORDER — ELETRIPTAN HYDROBROMIDE 40 MG/1
40 TABLET, FILM COATED ORAL
Qty: 18 TABLET | Refills: 11 | Status: SHIPPED | OUTPATIENT
Start: 2024-02-15 | End: 2024-05-15

## 2024-02-15 ASSESSMENT — PATIENT HEALTH QUESTIONNAIRE - PHQ9: SUM OF ALL RESPONSES TO PHQ QUESTIONS 1-9: 13

## 2024-02-15 NOTE — TELEPHONE ENCOUNTER
Hi,    Please reach out to this pt to get them scheduled for a 1 year follow up with Dr. Acosta.    Thank you!

## 2024-02-15 NOTE — PROGRESS NOTES
"Virtual Visit Details    Type of service:  Video Visit     Originating Location (pt. Location): Home    Distant Location (provider location):  On-site  Platform used for Video Visit: [facility]    Headache Neurology Progress Note  February 15, 2024    Subjective:    Luis Epstein returns for follow up of episodic migraine without aura.    Today, he reports headaches are well controlled, with breakthrough attacks occurring with weather, stress. He estimates he has 1-2 attacks per month.    He takes propranolol 40 mg BID. He denies side effects. He had side effects at 60 mg BID, with a weird feeling.    He takes eletriptan 20 mg as needed and naps, which is helpful. He also takes prochlorperazine 10 mg.     Otherwise, he has started biking now. He gets more migraine attacks in the summer.    He is working as a teacher.    Objective:    Vitals: Ht 1.727 m (5' 8\")   Wt 86.2 kg (190 lb)   BMI 28.89 kg/m    General: Cooperative, NAD  Neurologic:  Mental Status: Fully alert, attentive and oriented. Speech clear and fluent.   Cranial Nerves: Facial movements symmetric.   Motor: No abnormal movements.      Assessment/Plan:   Luis Epstein is a 28 year old adult who returns for follow up of episodic migraine without aura.  This is complicated by history of chronic sinus infection and sinus surgery, now stable.  I suspect he has migraine on a genetic basis. Headaches are currently stable, but needing more aggressive acute treatment.     We reviewed his symptomatic treatment strategy, with both acute and preventative treatment.  - For acute treatment of mild headache, he will continue ibuprofen as needed, not to exceed more than 14 days/month to avoid medication overuse.  - For acute treatment of moderate to severe headache, I recommend eletriptan at an increased dose of 40 mg at the onset of headache, with a repeat dose in 2 hours if needed.  This should not exceed more than 9 " days/month to avoid medication overuse.  - For headache related nausea, or as a rescue medicine for headache, I recommend prochlorperazine 10 mg as needed.      His headache frequency and severity warrant prevention.  -I recommend continuing propranolol 40 mg BID. I have updated his prescription to the 40 mg tablet.  -If propranolol is not tolerated or not effective in the future, amitriptyline, topiramate, or a CGRP inhibitor could be considered as alternative options.    I will see him back in 12 months, or sooner if needed.    The longitudinal plan of care for Luis was addressed during this visit. Due to the added complexity in care, I will continue to support Luis in the subsequent management of this condition(s) and with the ongoing continuity of care of this condition(s).    Mirtha Acosta MD  Neurology

## 2024-02-15 NOTE — NURSING NOTE
Is the patient currently in the state of [place]? YES    Visit mode:VIDEO    If the visit is dropped, the patient can be reconnected by: VIDEO VISIT: Send to e-mail at: [email or web address]    Will anyone else be joining the visit? NO  (If patient encounters technical issues they should call [phone]    How would you like to obtain your AVS? MyChart    Are changes needed to the allergy or medication list? Pt stated no changes to allergies and Pt stated no med changes    Reason for visit: ANDI Garvey VVF    Depression Response    Patient completed the PHQ-9 assessment for depression and scored >9? Yes  Question 9 on the PHQ-9 was positive for suicidality? No  Does patient have current mental health provider? Yes    Is this a virtual visit? Yes   Does patient have suicidal ideation (positive question 9)? No - offer to place Mental Health Referral.  Patient declined referral/not needed    I personally notified the following: visit provider

## 2024-02-15 NOTE — LETTER
"    2/15/2024         RE: Luis Epstein  [address] [facility] [address]        Dear Colleague,    Thank you for referring your patient, Luis Epstein, to the [facility]UR[facility]. Please see a copy of my visit note below.    Virtual Visit Details    Type of service:  Video Visit     Originating Location (pt. Location): Home    Distant Location (provider location):  On-site  Platform used for Video Visit: [facility]    Headache Neurology Progress Note  February 15, 2024    Subjective:    Luis Epstein returns for follow up of episodic migraine without aura.    Today, he reports headaches are well controlled, with breakthrough attacks occurring with weather, stress. He estimates he has 1-2 attacks per month.    He takes propranolol 40 mg BID. He denies side effects. He had side effects at 60 mg BID, with a weird feeling.    He takes eletriptan 20 mg as needed and naps, which is helpful. He also takes prochlorperazine 10 mg.     Otherwise, he has started biking now. He gets more migraine attacks in the summer.    He is working as a teacher.    Objective:    Vitals: Ht 1.727 m (5' 8\")   Wt 86.2 kg (190 lb)   BMI 28.89 kg/m    General: Cooperative, NAD  Neurologic:  Mental Status: Fully alert, attentive and oriented. Speech clear and fluent.   Cranial Nerves: Facial movements symmetric.   Motor: No abnormal movements.      Assessment/Plan:   Luis Epstein is a 28 year old adult who returns for follow up of episodic migraine without aura.  This is complicated by history of chronic sinus infection and sinus surgery, now stable.  I suspect he has migraine on a genetic basis. Headaches are currently stable, but needing more aggressive acute treatment.     We reviewed his symptomatic treatment strategy, with both acute and preventative treatment.  - For acute treatment of mild headache, he will continue ibuprofen as needed, not to exceed more than 14 " days/month to avoid medication overuse.  - For acute treatment of moderate to severe headache, I recommend eletriptan at an increased dose of 40 mg at the onset of headache, with a repeat dose in 2 hours if needed.  This should not exceed more than 9 days/month to avoid medication overuse.  - For headache related nausea, or as a rescue medicine for headache, I recommend prochlorperazine 10 mg as needed.      His headache frequency and severity warrant prevention.  -I recommend continuing propranolol 40 mg BID. I have updated his prescription to the 40 mg tablet.  -If propranolol is not tolerated or not effective in the future, amitriptyline, topiramate, or a CGRP inhibitor could be considered as alternative options.    I will see him back in 12 months, or sooner if needed.    The longitudinal plan of care for Luis was addressed during this visit. Due to the added complexity in care, I will continue to support Luis in the subsequent management of this condition(s) and with the ongoing continuity of care of this condition(s).    Mirtha Acosta MD  Neurology       Again, thank you for allowing me to participate in the care of your patient.        Sincerely,        Mirtha Acosta MD

## 2024-02-21 ENCOUNTER — LAB (OUTPATIENT)
Dept: LAB | Facility: CLINIC | Age: 29
End: 2024-02-21
Payer: COMMERCIAL

## 2024-02-21 DIAGNOSIS — F64.0 GENDER DYSPHORIA IN ADULT: Chronic | ICD-10-CM

## 2024-02-21 LAB
CHOLEST SERPL-MCNC: 165 MG/DL
FASTING STATUS PATIENT QL REPORTED: YES
FASTING STATUS PATIENT QL REPORTED: YES
GLUCOSE SERPL-MCNC: 89 MG/DL (ref 70–99)
HDLC SERPL-MCNC: 32 MG/DL
HGB BLD-MCNC: 15.6 G/DL (ref 11.7–17.7)
LDLC SERPL CALC-MCNC: 86 MG/DL
NONHDLC SERPL-MCNC: 133 MG/DL
TRIGL SERPL-MCNC: 234 MG/DL

## 2024-02-21 PROCEDURE — 82947 ASSAY GLUCOSE BLOOD QUANT: CPT

## 2024-02-21 PROCEDURE — 36415 COLL VENOUS BLD VENIPUNCTURE: CPT

## 2024-02-21 PROCEDURE — 84403 ASSAY OF TOTAL TESTOSTERONE: CPT

## 2024-02-21 PROCEDURE — 85018 HEMOGLOBIN: CPT

## 2024-02-21 PROCEDURE — 80061 LIPID PANEL: CPT

## 2024-02-23 LAB — TESTOST SERPL-MCNC: 1187 NG/DL (ref 8–950)

## 2024-04-19 DIAGNOSIS — F64.0 GENDER DYSPHORIA IN ADULT: ICD-10-CM

## 2024-04-23 DIAGNOSIS — F64.0 GENDER DYSPHORIA IN ADULT: Primary | ICD-10-CM

## 2024-04-23 RX ORDER — TESTOSTERONE CYPIONATE 200 MG/ML
60 INJECTION, SOLUTION INTRAMUSCULAR WEEKLY
Qty: 4 ML | Refills: 0 | Status: SHIPPED | OUTPATIENT
Start: 2024-04-23 | End: 2024-06-13

## 2024-04-23 NOTE — TELEPHONE ENCOUNTER
Left voice message regarding elevated testosterone level, inquiring when he does injections, recommend repeat testing mid-cycle (ordered), and follow up in June.    Jodi Lemon MD

## 2024-05-15 DIAGNOSIS — G43.909 MIGRAINE WITHOUT STATUS MIGRAINOSUS, NOT INTRACTABLE, UNSPECIFIED MIGRAINE TYPE: ICD-10-CM

## 2024-05-15 RX ORDER — PROCHLORPERAZINE MALEATE 10 MG
10 TABLET ORAL EVERY 6 HOURS PRN
Qty: 30 TABLET | Refills: 4 | Status: SHIPPED | OUTPATIENT
Start: 2024-05-15

## 2024-05-15 RX ORDER — ELETRIPTAN HYDROBROMIDE 40 MG/1
40 TABLET, FILM COATED ORAL
Qty: 54 TABLET | Refills: 4 | Status: SHIPPED | OUTPATIENT
Start: 2024-05-15 | End: 2024-08-06

## 2024-05-24 DIAGNOSIS — F64.0 GENDER DYSPHORIA IN ADULT: ICD-10-CM

## 2024-05-24 RX ORDER — TESTOSTERONE CYPIONATE 200 MG/ML
60 INJECTION, SOLUTION INTRAMUSCULAR WEEKLY
Qty: 4 ML | Refills: 0 | OUTPATIENT
Start: 2024-05-24

## 2024-06-13 ENCOUNTER — OFFICE VISIT (OUTPATIENT)
Dept: FAMILY MEDICINE | Facility: CLINIC | Age: 29
End: 2024-06-13
Payer: COMMERCIAL

## 2024-06-13 VITALS
HEIGHT: 68 IN | WEIGHT: 206.2 LBS | HEART RATE: 66 BPM | SYSTOLIC BLOOD PRESSURE: 115 MMHG | RESPIRATION RATE: 16 BRPM | TEMPERATURE: 98 F | DIASTOLIC BLOOD PRESSURE: 80 MMHG | OXYGEN SATURATION: 96 % | BODY MASS INDEX: 31.25 KG/M2

## 2024-06-13 DIAGNOSIS — F64.0 GENDER DYSPHORIA IN ADULT: ICD-10-CM

## 2024-06-13 DIAGNOSIS — F32.1 CURRENT MODERATE EPISODE OF MAJOR DEPRESSIVE DISORDER, UNSPECIFIED WHETHER RECURRENT (H): Primary | ICD-10-CM

## 2024-06-13 DIAGNOSIS — Z12.4 CERVICAL CANCER SCREENING: ICD-10-CM

## 2024-06-13 LAB
ALBUMIN UR-MCNC: NEGATIVE MG/DL
ALT SERPL W P-5'-P-CCNC: 39 U/L (ref 0–70)
ANION GAP SERPL CALCULATED.3IONS-SCNC: 9 MMOL/L (ref 7–15)
APPEARANCE UR: CLEAR
AST SERPL W P-5'-P-CCNC: 28 U/L (ref 0–45)
BACTERIA #/AREA URNS HPF: ABNORMAL /HPF
BILIRUB UR QL STRIP: NEGATIVE
BUN SERPL-MCNC: 13.2 MG/DL (ref 6–20)
CALCIUM SERPL-MCNC: 9.3 MG/DL (ref 8.6–10)
CHLORIDE SERPL-SCNC: 105 MMOL/L (ref 98–107)
CHOLEST SERPL-MCNC: 187 MG/DL
COLOR UR AUTO: YELLOW
CREAT SERPL-MCNC: 1.09 MG/DL (ref 0.51–1.17)
CREAT UR-MCNC: 333 MG/DL
DEPRECATED HCO3 PLAS-SCNC: 26 MMOL/L (ref 22–29)
EGFRCR SERPLBLD CKD-EPI 2021: >90 ML/MIN/1.73M2
FASTING STATUS PATIENT QL REPORTED: NO
GLUCOSE SERPL-MCNC: 63 MG/DL (ref 70–99)
GLUCOSE SERPL-MCNC: 63 MG/DL (ref 70–99)
GLUCOSE UR STRIP-MCNC: NEGATIVE MG/DL
HDLC SERPL-MCNC: 44 MG/DL
HGB UR QL STRIP: NEGATIVE
KETONES UR STRIP-MCNC: NEGATIVE MG/DL
LDLC SERPL CALC-MCNC: 94 MG/DL
LEUKOCYTE ESTERASE UR QL STRIP: ABNORMAL
MICROALBUMIN UR-MCNC: 16.6 MG/L
MICROALBUMIN/CREAT UR: 4.98 MG/G CR (ref 0–25)
NITRATE UR QL: NEGATIVE
NONHDLC SERPL-MCNC: 143 MG/DL
PH UR STRIP: 5.5 [PH] (ref 5–8)
POTASSIUM SERPL-SCNC: 4.3 MMOL/L (ref 3.4–5.3)
RBC #/AREA URNS AUTO: ABNORMAL /HPF
SODIUM SERPL-SCNC: 140 MMOL/L (ref 135–145)
SP GR UR STRIP: >=1.03 (ref 1–1.03)
SQUAMOUS #/AREA URNS AUTO: ABNORMAL /LPF
TRIGL SERPL-MCNC: 245 MG/DL
UROBILINOGEN UR STRIP-ACNC: 0.2 E.U./DL
WBC #/AREA URNS AUTO: ABNORMAL /HPF

## 2024-06-13 PROCEDURE — 82570 ASSAY OF URINE CREATININE: CPT | Performed by: STUDENT IN AN ORGANIZED HEALTH CARE EDUCATION/TRAINING PROGRAM

## 2024-06-13 PROCEDURE — G2211 COMPLEX E/M VISIT ADD ON: HCPCS | Performed by: STUDENT IN AN ORGANIZED HEALTH CARE EDUCATION/TRAINING PROGRAM

## 2024-06-13 PROCEDURE — 80048 BASIC METABOLIC PNL TOTAL CA: CPT | Performed by: STUDENT IN AN ORGANIZED HEALTH CARE EDUCATION/TRAINING PROGRAM

## 2024-06-13 PROCEDURE — 80061 LIPID PANEL: CPT | Performed by: STUDENT IN AN ORGANIZED HEALTH CARE EDUCATION/TRAINING PROGRAM

## 2024-06-13 PROCEDURE — 84450 TRANSFERASE (AST) (SGOT): CPT | Performed by: STUDENT IN AN ORGANIZED HEALTH CARE EDUCATION/TRAINING PROGRAM

## 2024-06-13 PROCEDURE — 82043 UR ALBUMIN QUANTITATIVE: CPT | Performed by: STUDENT IN AN ORGANIZED HEALTH CARE EDUCATION/TRAINING PROGRAM

## 2024-06-13 PROCEDURE — 81001 URINALYSIS AUTO W/SCOPE: CPT | Performed by: STUDENT IN AN ORGANIZED HEALTH CARE EDUCATION/TRAINING PROGRAM

## 2024-06-13 PROCEDURE — 36415 COLL VENOUS BLD VENIPUNCTURE: CPT | Performed by: STUDENT IN AN ORGANIZED HEALTH CARE EDUCATION/TRAINING PROGRAM

## 2024-06-13 PROCEDURE — 84403 ASSAY OF TOTAL TESTOSTERONE: CPT | Performed by: STUDENT IN AN ORGANIZED HEALTH CARE EDUCATION/TRAINING PROGRAM

## 2024-06-13 PROCEDURE — 84460 ALANINE AMINO (ALT) (SGPT): CPT | Performed by: STUDENT IN AN ORGANIZED HEALTH CARE EDUCATION/TRAINING PROGRAM

## 2024-06-13 PROCEDURE — 99214 OFFICE O/P EST MOD 30 MIN: CPT | Mod: GC | Performed by: STUDENT IN AN ORGANIZED HEALTH CARE EDUCATION/TRAINING PROGRAM

## 2024-06-13 RX ORDER — BUPROPION HYDROCHLORIDE 150 MG/1
150 TABLET ORAL EVERY MORNING
Qty: 30 TABLET | Refills: 2 | Status: SHIPPED | OUTPATIENT
Start: 2024-06-13

## 2024-06-13 RX ORDER — TESTOSTERONE CYPIONATE 200 MG/ML
60 INJECTION, SOLUTION INTRAMUSCULAR WEEKLY
Qty: 4 ML | Refills: 5 | Status: SHIPPED | OUTPATIENT
Start: 2024-06-13

## 2024-06-13 NOTE — PATIENT INSTRUCTIONS
I'll be working at [employer] in [place] starting in October    Start bupropion    Consolidate psych follow up -either with me or your psychiatrist    Labs today

## 2024-06-13 NOTE — PROGRESS NOTES
Assessment & Plan     Luis was seen today for recheck and medication follow-up.    Diagnoses and all orders for this visit:    Current moderate episode of major depressive disorder, unspecified whether recurrent (H)  Increased depression without SI. On max dose of fluoxetine 80 mg daily. Start bupropion, and patient to decide whether to consolidate mental health med management with his psychiatrist or primary.   -     buPROPion (WELLBUTRIN XL) 150 MG 24 hr tablet; Take 1 tablet (150 mg) by mouth every morning    Gender dysphoria in adult  On stable dose of HRT, however last testosterone level was over goal (1187). Recheck labs today, which are midcycle.  -     Testosterone total; Future  -     Lipid panel reflex to direct LDL Non-fasting; Future  -     ALT; Future  -     AST; Future  -     Basic metabolic panel; Future  -     Glucose; Future  -     testosterone cypionate (DEPOTESTOSTERONE) 200 MG/ML injection; Inject 0.3 mLs (60 mg) into the muscle once a week    Elevated creatinine  Mild stable elevation to 1.18 over past year and a half. No hypertension, diabetes, or family history of kidney disease. Recheck creatinine today along with urine studies.  -     Albumin Random Urine Quantitative with Creat Ratio; Future  -     UA Macroscopic with reflex to Microscopic and Culture; Future    Cervical cancer screening  Offered HPV self-sampling cervical cancer screening option and patient Declined/deferred. Plans to complete cervical cancer screening at next visit - pap or HPV only.       Return in about 6 weeks (around 7/25/2024) for Follow up with me.     The longitudinal plan of care for the diagnosis(es)/condition(s) as documented were addressed during this visit. Due to the added complexity in care, I will continue to support Luis in the subsequent management and with ongoing continuity of care.    Jodi Lemon MD  [facility] [place] [facility]    Subjective   Luis is a 28 year old with PMH migraine,  "chronic sinusitis, HLD, ADHD, MDD, gender dysphoria on masculinizing HRT,  who presents for the following health issues     Patient presents with:  RECHECK: Testosterone   Medication Follow-up: Fluoxetine     HRT  - no concerns  - injections are Monday    Mental health  - low energy, irritable, depressed, hopelessness  - had stressors at work with co-workers  - maybe some anxiety  - no SI  - no history of SI  - never been on an antidepressant  - therapist 1x week, good fit    Kidneys:  - no family history of kidney problems    Psychiatry  - sees [facility]. Sees them every 4 months. Just saw her    Chart Review:  - last seen 6/23/23  - pap due in November, NIL    Review of Systems   Constitutional, HEENT, cardiovascular, pulmonary, GI, and  systems are negative, except as otherwise noted.      Objective    /80   Pulse 66   Temp 98  F (36.7  C) (Temporal)   Resp 16   Ht 1.727 m (5' 8\")   Wt 93.5 kg (206 lb 3.2 oz)   SpO2 96%   BMI 31.35 kg/m    Body mass index is 31.35 kg/m .    Physical Exam   Physical Exam  Constitutional:       Appearance: Normal appearance.   HENT:      Head: Normocephalic.      Nose: No congestion or rhinorrhea.   Eyes:      Conjunctiva/sclera: Conjunctivae normal.   Cardiovascular:      Rate and Rhythm: Normal rate.   Pulmonary:      Effort: Pulmonary effort is normal. No respiratory distress.   Skin:     General: Skin is dry.   Neurological:      Mental Status: He is alert and oriented to person, place, and time.   Psychiatric:         Mood and Affect: Mood normal.         Behavior: Behavior normal.         Thought Content: Thought content normal.         Judgment: Judgment normal.          ----- Service Performed and Documented by Resident  ------      "

## 2024-06-15 PROBLEM — E16.2 HYPOGLYCEMIA: Status: ACTIVE | Noted: 2024-06-15

## 2024-06-15 LAB — TESTOST SERPL-MCNC: 625 NG/DL (ref 8–950)

## 2024-07-02 PROBLEM — F90.9 ADHD (ATTENTION DEFICIT HYPERACTIVITY DISORDER): Chronic | Status: ACTIVE | Noted: 2022-05-06

## 2024-07-08 DIAGNOSIS — F32.1 CURRENT MODERATE EPISODE OF MAJOR DEPRESSIVE DISORDER, UNSPECIFIED WHETHER RECURRENT (H): ICD-10-CM

## 2024-07-08 NOTE — TELEPHONE ENCOUNTER
"Request for medication refill:  FLUoxetine (PROZAC) 40 MG capsule     Providers if patient needs an appointment and you are willing to give a one month supply please refill for one month and  send a letter/MyChart using \".SMILLIMITEDREFILL\" .smillimited and route chart to \"P [facility] \" (Giving one month refill in non controlled medications is strongly recommended before denial)    If refill has been denied, meaning absolutely no refills without visit, please complete the smart phrase \".smirxrefuse\" and route it to the \"P [facility] MED REFILLS\"  pool to inform the patient and the pharmacy.    Aleta Patrick [facility] [address]      "

## 2024-07-09 RX ORDER — FLUOXETINE 40 MG/1
CAPSULE ORAL
Qty: 180 CAPSULE | Refills: 1 | Status: SHIPPED | OUTPATIENT
Start: 2024-07-09

## 2024-07-11 DIAGNOSIS — E78.2 MIXED HYPERLIPIDEMIA: ICD-10-CM

## 2024-07-11 RX ORDER — ROSUVASTATIN CALCIUM 20 MG/1
20 TABLET, COATED ORAL DAILY
Qty: 100 TABLET | Refills: 0 | Status: SHIPPED | OUTPATIENT
Start: 2024-07-11

## 2024-07-11 NOTE — LETTER
July 11, 2024       TO: Luis Epstein  [address] [facility] [address]       Dear Luis Epstein,    We recently received a call from your pharmacy requesting a refill of your medication(s).    Our records indicate that you are due for follow-up with your Heart Care Provider. We will refill your medications for 3 months which will allow you enough time to be seen.    Please call [phone] to schedule your appointment.    Thank you for allowing [facility] to be a part of your health care team and we look forward to seeing you soon.    Thank you,    [facility]

## 2024-07-15 ENCOUNTER — TELEPHONE (OUTPATIENT)
Dept: FAMILY MEDICINE | Facility: CLINIC | Age: 29
End: 2024-07-15

## 2024-07-15 NOTE — TELEPHONE ENCOUNTER
Prior Authorization Retail Medication Request    Medication/Dose: Prior Authorization testosterone cypionate (DEPOTESTOSTERONE) 200 MG/ML injection  Diagnosis and ICD code (if different than what is on RX):    New/renewal/insurance change PA/secondary ins. PA:  Previously Tried and Failed:    Rationale:      Insurance   Primary: [employer]   Insurance ID: [id number]     Secondary (if applicable):  Insurance ID:      Pharmacy Information (if different than what is on RX)  Name:    Phone:    Fax:

## 2024-07-15 NOTE — LETTER
RE: Luis Epstein  [address] [facility] [address]      Prior Authorization Appeal Letter of Support    Patient: Luis Epstein  YOB: 1995    Date: 08/15/2024    To whom it may concern:    I am writing this letter on the behalf of my patient listed above, Luis Epstein, and the recent decision for a denial on (date 2024) for Testosterone Cypionate (Depo-Testosterone).    I support Luis in appealing this decision for the following reasons.    1) Testosterone Cypionate is a medically necessary treatment for gender dysphoria in adolescents and adults when the assigned  sex is female but the experienced gender is male. This is determined by the prevailing medical community, of which I am a part of as a family medicine physician in [place] ([place] license number [id number]). I follow World Professional Association of Transgender Health (WPATH) guidelines and I have evaluated Mr. Epstein in accordance to their published guidelines and recommendations.    2) Testosterone Cypionate is an appropriate and effective treatment for gender dysphoria, for which Mr. Epstein is currently being treated for. It is a widely accepted treatment for gender dysphoria by WPATH, as noted in the Standards of Care for the Health of Transsexual, Transgender, and Gender-Nonconforming People, which is based on the best available science and expert professional consensus.     3) Testosterone Cypionate is one of the cheapest forms of testosterone and has been decided to be the most appropriate form of testosterone for Mr. Epstein.     4) Mr. Epstein lives as male and has undergone legal and medical therapies to live physically as such. As a person who identifies as and lives as male, but does not make typical male range levels of testosterone, Mr. Epstein suffers from hypogonadism. Maintaining his testosterone levels with Testosterone Cypionate is essential for Mr. Epstein' continued physical and mental health.    5).   Tete is a model patient and will continue to come to our clinic for care in the future.    For all the reasons above, I urge you to reconsider the denial for coverage of Testosterone Cypionate for Mr. Epstein        Respectfully,      Jodi Lemon MD

## 2024-07-17 NOTE — TELEPHONE ENCOUNTER
PA Initiation    Medication: TESTOSTERONE CYPIONATE 200 MG/ML IM SOLN  Insurance Company: [facility] Non-Specialty PA's - Phone [phone] Fax [phone]  Pharmacy Filling the Rx: [id number] - [place], [place] - [address] AT [facility] OF  [address]  Filling Pharmacy Phone: [phone]  Filling Pharmacy Fax: [phone]  Start Date: 7/17/2024

## 2024-07-19 ENCOUNTER — DOCUMENTATION ONLY (OUTPATIENT)
Dept: FAMILY MEDICINE | Facility: CLINIC | Age: 29
End: 2024-07-19
Payer: COMMERCIAL

## 2024-07-19 NOTE — TELEPHONE ENCOUNTER
PRIOR AUTHORIZATION DENIED    Medication: TESTOSTERONE CYPIONATE 200 MG/ML IM SOLN  Insurance Company: [facility] Non-Specialty PA's - Phone [phone] Fax [phone]  Denial Date: 7/19/2024  Denial Reason(s):       Appeal Information:         Patient Notified: NO

## 2024-08-06 ENCOUNTER — OFFICE VISIT (OUTPATIENT)
Dept: FAMILY MEDICINE | Facility: CLINIC | Age: 29
End: 2024-08-06
Payer: COMMERCIAL

## 2024-08-06 VITALS
WEIGHT: 204.3 LBS | TEMPERATURE: 97.9 F | BODY MASS INDEX: 30.96 KG/M2 | HEIGHT: 68 IN | HEART RATE: 53 BPM | RESPIRATION RATE: 15 BRPM | DIASTOLIC BLOOD PRESSURE: 74 MMHG | OXYGEN SATURATION: 96 % | SYSTOLIC BLOOD PRESSURE: 111 MMHG

## 2024-08-06 DIAGNOSIS — Z23 ENCOUNTER FOR ADMINISTRATION OF VACCINE: ICD-10-CM

## 2024-08-06 DIAGNOSIS — G43.709 CHRONIC MIGRAINE WITHOUT AURA WITHOUT STATUS MIGRAINOSUS, NOT INTRACTABLE: Chronic | ICD-10-CM

## 2024-08-06 DIAGNOSIS — Z11.51 SCREENING FOR HUMAN PAPILLOMAVIRUS: ICD-10-CM

## 2024-08-06 DIAGNOSIS — S49.91XA SHOULDER INJURY, RIGHT, INITIAL ENCOUNTER: Primary | ICD-10-CM

## 2024-08-06 DIAGNOSIS — G43.909 MIGRAINE WITHOUT STATUS MIGRAINOSUS, NOT INTRACTABLE, UNSPECIFIED MIGRAINE TYPE: ICD-10-CM

## 2024-08-06 PROCEDURE — 99213 OFFICE O/P EST LOW 20 MIN: CPT | Mod: 25

## 2024-08-06 PROCEDURE — 90471 IMMUNIZATION ADMIN: CPT

## 2024-08-06 PROCEDURE — 90632 HEPA VACCINE ADULT IM: CPT

## 2024-08-06 PROCEDURE — 90472 IMMUNIZATION ADMIN EACH ADD: CPT

## 2024-08-06 PROCEDURE — 90746 HEPB VACCINE 3 DOSE ADULT IM: CPT

## 2024-08-06 RX ORDER — ELETRIPTAN HYDROBROMIDE 40 MG/1
40 TABLET, FILM COATED ORAL
Qty: 54 TABLET | Refills: 4 | Status: SHIPPED | OUTPATIENT
Start: 2024-08-06

## 2024-08-06 RX ORDER — ELETRIPTAN HYDROBROMIDE 40 MG/1
40 TABLET, FILM COATED ORAL
Qty: 54 TABLET | Refills: 4 | Status: SHIPPED | OUTPATIENT
Start: 2024-08-06 | End: 2024-08-06

## 2024-08-06 ASSESSMENT — PATIENT HEALTH QUESTIONNAIRE - PHQ9
SUM OF ALL RESPONSES TO PHQ QUESTIONS 1-9: 8
10. IF YOU CHECKED OFF ANY PROBLEMS, HOW DIFFICULT HAVE THESE PROBLEMS MADE IT FOR YOU TO DO YOUR WORK, TAKE CARE OF THINGS AT HOME, OR GET ALONG WITH OTHER PEOPLE: SOMEWHAT DIFFICULT
SUM OF ALL RESPONSES TO PHQ QUESTIONS 1-9: 8

## 2024-08-06 NOTE — Clinical Note
Juan Titus I noticed that you are prescribing 54 pills of the triptan. That is Ok, but I have seen Rxs for 10 pills to remind and encourage patients to only use 1/3 of the time so they don't develop chronic daily headache syndrome.  Nothing to change, since for all I know this is her yearly Rx - just a nuance in care,  Do

## 2024-08-06 NOTE — TELEPHONE ENCOUNTER
RX Authorization    Medication: Eletriptan (REPLAX) 40 MG tablet    Date last refill ordered: 5/15/2024    Quantity ordered: 54    # refills: 4    Date of last clinic visit with ordering provider: 2/15/2024    Date of next clinic visit with ordering provider:    All pertinent protocol data (lab date/result):    Include pertinent information from patients message:
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
## 2024-08-15 NOTE — TELEPHONE ENCOUNTER
Medication Appeal Initiation    Medication: TESTOSTERONE CYPIONATE 200 MG/ML IM SOLN  Appeal Start Date:  8/15/2024  Insurance Company: [employer]  Insurance Phone: [phone]  Insurance Fax: [phone]  Comments:       INITIATED VIA RIGHTFAX

## 2024-08-19 NOTE — TELEPHONE ENCOUNTER
MEDICATION APPEAL APPROVED    Medication: TESTOSTERONE CYPIONATE 200 MG/ML IM SOLN  Authorization Effective Date: 8/1/2024  Authorization Expiration Date: 8/16/2025  Approved Dose/Quantity:       Reference #:     Appeal Insurance Company: [employer]  Expected CoPay: $       CoPay Card Available:    Financial Assistance Needed:   Filling Pharmacy: [id number] - [place], [place] - [address] AT [facility] OF  [address]  Patient Notified: Instructed pharmacy to notify patient once order is ready.     Comments:

## 2024-08-27 ENCOUNTER — APPOINTMENT (OUTPATIENT)
Dept: FAMILY MEDICINE | Facility: CLINIC | Age: 29
End: 2024-08-27
Payer: COMMERCIAL

## 2024-09-09 ENCOUNTER — ALLIED HEALTH/NURSE VISIT (OUTPATIENT)
Dept: FAMILY MEDICINE | Facility: CLINIC | Age: 29
End: 2024-09-09
Payer: COMMERCIAL

## 2024-09-09 DIAGNOSIS — Z12.4 CERVICAL CANCER SCREENING: Primary | ICD-10-CM

## 2024-09-09 DIAGNOSIS — Z23 ENCOUNTER FOR IMMUNIZATION: Primary | ICD-10-CM

## 2024-09-09 PROCEDURE — 90746 HEPB VACCINE 3 DOSE ADULT IM: CPT

## 2024-09-09 PROCEDURE — 87624 HPV HI-RISK TYP POOLED RSLT: CPT | Mod: KX

## 2024-09-09 PROCEDURE — 90472 IMMUNIZATION ADMIN EACH ADD: CPT

## 2024-09-09 PROCEDURE — 90471 IMMUNIZATION ADMIN: CPT

## 2024-09-09 PROCEDURE — 90656 IIV3 VACC NO PRSV 0.5 ML IM: CPT

## 2024-09-09 NOTE — PROGRESS NOTES
Prior to immunization administration, verified patients identity using patient s name and date of birth. Please see Immunization Activity for additional information.     Is the patient's temperature normal (100.5 or less)? Yes     Patient MEETS CRITERIA. PROCEED with vaccine administration.      Patient instructed to remain in clinic for 15 minutes afterwards, and to report any adverse reactions.      Link to Ancillary Visit Immunization Standing Orders SmartSet     Screening performed by Veronica Chilel on 9/9/2024 at 3:50 PM.

## 2024-09-12 LAB
HPV HR 12 DNA CVX QL NAA+PROBE: NEGATIVE
HPV16 DNA CVX QL NAA+PROBE: NEGATIVE
HPV18 DNA CVX QL NAA+PROBE: NEGATIVE
HUMAN PAPILLOMA VIRUS FINAL DIAGNOSIS: NORMAL

## 2024-09-22 ENCOUNTER — HEALTH MAINTENANCE LETTER (OUTPATIENT)
Age: 29
End: 2024-09-22

## 2024-10-15 DIAGNOSIS — H66.002 NON-RECURRENT ACUTE SUPPURATIVE OTITIS MEDIA OF LEFT EAR WITHOUT SPONTANEOUS RUPTURE OF TYMPANIC MEMBRANE: Primary | ICD-10-CM

## 2024-10-23 ENCOUNTER — OFFICE VISIT (OUTPATIENT)
Dept: FAMILY MEDICINE | Facility: CLINIC | Age: 29
End: 2024-10-23
Payer: COMMERCIAL

## 2024-10-23 VITALS
SYSTOLIC BLOOD PRESSURE: 120 MMHG | OXYGEN SATURATION: 98 % | TEMPERATURE: 98.1 F | HEART RATE: 55 BPM | DIASTOLIC BLOOD PRESSURE: 78 MMHG | HEIGHT: 68 IN | BODY MASS INDEX: 31.06 KG/M2 | RESPIRATION RATE: 16 BRPM

## 2024-10-23 DIAGNOSIS — R05.1 ACUTE COUGH: Primary | ICD-10-CM

## 2024-10-23 DIAGNOSIS — Z23 NEED FOR COVID-19 VACCINE: ICD-10-CM

## 2024-10-23 DIAGNOSIS — H71.92 CHOLESTEATOMA, LEFT: ICD-10-CM

## 2024-10-23 DIAGNOSIS — Z20.818 PERTUSSIS EXPOSURE: ICD-10-CM

## 2024-10-23 PROCEDURE — 91320 SARSCV2 VAC 30MCG TRS-SUC IM: CPT

## 2024-10-23 PROCEDURE — 99213 OFFICE O/P EST LOW 20 MIN: CPT | Mod: 25

## 2024-10-23 PROCEDURE — 90480 ADMN SARSCOV2 VAC 1/ONLY CMP: CPT

## 2024-10-23 PROCEDURE — 87798 DETECT AGENT NOS DNA AMP: CPT

## 2024-10-23 ASSESSMENT — PATIENT HEALTH QUESTIONNAIRE - PHQ9
10. IF YOU CHECKED OFF ANY PROBLEMS, HOW DIFFICULT HAVE THESE PROBLEMS MADE IT FOR YOU TO DO YOUR WORK, TAKE CARE OF THINGS AT HOME, OR GET ALONG WITH OTHER PEOPLE: SOMEWHAT DIFFICULT
SUM OF ALL RESPONSES TO PHQ QUESTIONS 1-9: 11
SUM OF ALL RESPONSES TO PHQ QUESTIONS 1-9: 11

## 2024-10-23 NOTE — PROGRESS NOTES
"  Assessment & Plan     Acute cough  Pertussis exposure  Negative pertussis swab. If symptoms persist, consider RSV. Counseled on supportive cares. No fever, chills, SOB, wheezing, production of sputum, myalgias. Physical examination within normal limits besides rhinorrhea.  - B. pertussis/parapertussis PCR-NP    Cholesteatoma, left  ENT appointment scheduled for management.    Need for COVID-19 vaccine  - COVID-19 12+ (PFIZER)    BMI  Estimated body mass index is 31.06 kg/m  as calculated from the following:    Height as of this encounter: 1.727 m (5' 8\").    Weight as of 8/6/24: 92.7 kg (204 lb 4.8 oz).     Return if symptoms worsen or fail to improve.    Dmitriy Smith is a 29 year old, presenting for the following health issues:  Cough (Whooping cough exposure.  Symptoms since monday) and Ear Problem (Chronic ear infection. )        10/23/2024     9:20 AM   Additional Questions   Roomed by Chey   Accompanied by self         10/23/2024   Declines Weight   Did patient decline having their weight taken? Yes          10/23/2024    Information    services provided? No        HPI   - Rhinorrhea started 4 days ago   - Blew nose every 10 minutes    - Mucous is clear to light yellow   - Associated headaches   - Chronic left ear infection that began in August    - Brown discharge    - ENT appointment at outside facility in a couple of weeks   - History of sinus surgery for chronic sinus infection  - Cough started two days ago  - Denies fevers, chills, SOB, wheezing, production of sputum, muscle aches  - Has a weekly therapist but hasn't seen him for the past few weeks   - Sees psychiatrist every 3-4 months     Review of Systems  Constitutional, neuro, ENT, endocrine, pulmonary, cardiac, gastrointestinal, genitourinary, musculoskeletal, integument and psychiatric systems are negative, except as otherwise noted.      Objective    /78   Pulse 55   Temp 98.1  F (36.7  C) (Oral)   Resp 16   " "Ht 1.727 m (5' 8\")   SpO2 98%   BMI 31.06 kg/m    Body mass index is 31.06 kg/m .    Physical Exam  Vitals reviewed.   Constitutional:       Appearance: Normal appearance.   HENT:      Head: Normocephalic and atraumatic.      Right Ear: Tympanic membrane normal.      Left Ear: Tympanic membrane is injected.      Ears:        Comments: Cholesteatoma on left TM.     Nose: Rhinorrhea present.      Comments: Inflamed inferior turbinates.     Mouth/Throat:      Mouth: Mucous membranes are moist.      Pharynx: Oropharynx is clear. No oropharyngeal exudate or posterior oropharyngeal erythema.   Eyes:      Extraocular Movements: Extraocular movements intact.      Conjunctiva/sclera: Conjunctivae normal.   Cardiovascular:      Rate and Rhythm: Normal rate and regular rhythm.      Heart sounds: Normal heart sounds.   Pulmonary:      Effort: Pulmonary effort is normal.      Breath sounds: Normal breath sounds. No wheezing.   Abdominal:      General: There is no distension.      Palpations: Abdomen is soft.      Tenderness: There is no abdominal tenderness.   Musculoskeletal:         General: Normal range of motion.      Right lower leg: No edema.      Left lower leg: No edema.   Skin:     General: Skin is warm and dry.   Neurological:      Mental Status: He is alert and oriented to person, place, and time.        Signed Electronically by: Nusrat Dong DO, MPH  Answers submitted by the patient for this visit:  Patient Health Questionnaire (Submitted on 10/23/2024)  If you checked off any problems, how difficult have these problems made it for you to do your work, take care of things at home, or get along with other people?: Somewhat difficult  PHQ9 TOTAL SCORE: 11    "

## 2024-10-24 LAB
B PARAPERT DNA SPEC QL NAA+PROBE: NOT DETECTED
B PERT DNA SPEC QL NAA+PROBE: NOT DETECTED

## 2024-10-28 NOTE — PATIENT INSTRUCTIONS
Patient Education   Here is the plan from today's visit    1. Acute cough (Primary)  - B. pertussis/parapertussis PCR-NP    2. Pertussis exposure  - B. pertussis/parapertussis PCR-NP    3. Cholesteatoma, left    4. Need for COVID-19 vaccine  - COVID-19 12+ (PFIZER)    Please call or return to clinic if your symptoms don't go away.    Follow up plan  Return if symptoms worsen or fail to improve.    Thank you for coming to [facility] today.  Lab Testing:  **If you had lab testing today and your results are reassuring or normal they will be mailed to you or sent through [email or web address] within 7 days.   **If the lab tests need quick action we will call you with the results.  **If you are having labs done on a different day, please call [phone] to schedule at [facility] or [phone] for other [facility] Outpatient Lab locations. Labs do not offer walk-in appointments.  The phone number we will call with results is # [phone] (home) . If this is not the best number please call our clinic and change the number.  Medication Refills:  If you need any refills please call your pharmacy and they will contact us.   If you need to  your refill at a new pharmacy, please contact the new pharmacy directly. The new pharmacy will help you get your medications transferred faster.   Scheduling:  If you have any concerns about today's visit or wish to schedule another appointment please call our office during normal business hours [phone] (8-5:00 M-F). If you can no longer make a scheduled visit, please cancel via [email or web address] or call us to cancel.   If a referral was made to an [facility] specialty provider and you do not get a call from central scheduling, please refer to directions on your visit summary or call our office during normal business hours for assistance.   If a Mammogram was ordered for you at the Breast Center call [phone] to schedule or change your appointment.  If you had an  XRay/CT/Ultrasound/MRI ordered the number is [phone] to schedule or change your radiology appointment.   [facility] has limited ultrasound appointments available on Wednesdays, if you would like your ultrasound at [facility], please call [phone] to schedule.   Medical Concerns:  If you have urgent medical concerns please call [phone] at any time of the day.    Nusrat Dong, DO

## 2024-10-30 NOTE — PROGRESS NOTES
Preceptor Attestation:   Patient seen, evaluated and discussed with the resident. I have verified the content of the note, which accurately reflects my assessment of the patient and the plan of care.   Supervising Physician:  Rolo Wilson MD

## 2024-11-01 ENCOUNTER — TELEPHONE (OUTPATIENT)
Dept: NEUROLOGY | Facility: CLINIC | Age: 29
End: 2024-11-01
Payer: COMMERCIAL

## 2024-11-01 NOTE — TELEPHONE ENCOUNTER
Left Voicemail (1st Attempt) for the patient to call back and schedule the following:    Location:  Neurology  Provider: Dr. Acosta  Appointment type: Return Headache  Appointment mode In person  Return date: February 2025    Specialty phone number: [phone]    Is Imaging Needed:N  Imaging Phone Number to provide to patient: NA    Additional Notes: Patient need to schedule annual visit with Dr. Acosta- Schedule with provider by end of February 2025 as this is an annual visit.

## 2024-11-06 NOTE — TELEPHONE ENCOUNTER
Left Voicemail (2nd Attempt) for the patient to call back and schedule the following:    Location:  Neurology  Provider: Dave  Appointment type: return headache  Appointment mode: in person  Return date: February 2025    Specialty phone number: [phone]    Is Imaging Needed: no  Imaging Phone Number to provide to patient: n/a    Additional Notes: Patient need to schedule annual visit with Dr. Acosta- Schedule with provider by end of February 2025 as this is an annual visit.

## 2024-11-20 NOTE — TELEPHONE ENCOUNTER
Left Voicemail (3rdAttempt) for the patient to call back and schedule the following:     Location:  Neurology  Provider: Dave  Appointment type: return headache  Appointment mode: in person  Return date: February 2025     Specialty phone number: [phone]     Is Imaging Needed: no  Imaging Phone Number to provide to patient: n/a     Additional Notes: Patient need to schedule annual visit with Dr. Acosta- Schedule with provider by end of February 2025 as this is an annual visit.

## 2025-01-03 ENCOUNTER — MYC REFILL (OUTPATIENT)
Dept: CARDIOLOGY | Facility: CLINIC | Age: 30
End: 2025-01-03
Payer: COMMERCIAL

## 2025-01-03 DIAGNOSIS — E78.2 MIXED HYPERLIPIDEMIA: ICD-10-CM

## 2025-01-03 RX ORDER — ROSUVASTATIN CALCIUM 20 MG/1
20 TABLET, COATED ORAL DAILY
Qty: 90 TABLET | Refills: 1 | Status: CANCELLED | OUTPATIENT
Start: 2025-01-03

## 2025-01-03 NOTE — TELEPHONE ENCOUNTER
[facility] Cardiology Refill Guideline reviewed.  Medication does not meet criteria for refill as patient was last seen by cardiology 5/2023 and discharged from cardiology care at that time. Forwarded to PCP to fill.

## 2025-01-03 NOTE — TELEPHONE ENCOUNTER
"Patient comment: Appointment scheduled for earliest possibke (april 28)       Request for medication refill:  rosuvastatin (CRESTOR) 20 MG tablet     Providers if patient needs an appointment and you are willing to give a one month supply please refill for one month and  send a letter/MyChart using \".SMILLIMITEDREFILL\" .smillimited and route chart to \"P [facility] \" (Giving one month refill in non controlled medications is strongly recommended before denial)    If refill has been denied, meaning absolutely no refills without visit, please complete the smart phrase \".smirxrefuse\" and route it to the \"P [facility] MED REFILLS\"  pool to inform the patient and the pharmacy.    Aleta Patrick, CAMILO      "

## 2025-01-06 RX ORDER — ROSUVASTATIN CALCIUM 20 MG/1
20 TABLET, COATED ORAL DAILY
Qty: 30 TABLET | Refills: 0 | Status: SHIPPED | OUTPATIENT
Start: 2025-01-06

## 2025-02-01 DIAGNOSIS — E78.2 MIXED HYPERLIPIDEMIA: ICD-10-CM

## 2025-02-03 RX ORDER — ROSUVASTATIN CALCIUM 20 MG/1
20 TABLET, COATED ORAL DAILY
Qty: 30 TABLET | Refills: 0 | Status: SHIPPED | OUTPATIENT
Start: 2025-02-03

## 2025-02-03 NOTE — TELEPHONE ENCOUNTER
"Request for medication refill:    rosuvastatin (CRESTOR) 20 MG tablet     Providers if patient needs an appointment and you are willing to give a one month supply please refill for one month and  send a letter/MyChart using \".SMILLIMITEDREFILL\" .smillimited and route chart to \"P [facility] \" (Giving one month refill in non controlled medications is strongly recommended before denial)    If refill has been denied, meaning absolutely no refills without visit, please complete the smart phrase \".smirxrefuse\" and route it to the \"P [facility] MED REFILLS\"  pool to inform the patient and the pharmacy.    Sweta Chaudhry MA     "

## 2025-02-03 NOTE — TELEPHONE ENCOUNTER
Medication Limited Refill   Reason: Patient needs: provider visit (scheduled for 2/6/25)  Provider: I have not called the patient about the Rx denial, please call.  PCS: Please notify the pharmacy, Please contact the patient to explain reasoning provided above and to be sure to keep the scheduled appointment on 2/6/25.      Ada Patel MD

## 2025-02-05 DIAGNOSIS — F32.1 CURRENT MODERATE EPISODE OF MAJOR DEPRESSIVE DISORDER, UNSPECIFIED WHETHER RECURRENT (H): ICD-10-CM

## 2025-02-05 NOTE — TELEPHONE ENCOUNTER
"Request for medication refill:  FLUoxetine (PROZAC) 40 MG capsule     Providers if patient needs an appointment and you are willing to give a one month supply please refill for one month and  send a letter/MyChart using \".SMILLIMITEDREFILL\" .smillimited and route chart to \"P [facility] \" (Giving one month refill in non controlled medications is strongly recommended before denial)    If refill has been denied, meaning absolutely no refills without visit, please complete the smart phrase \".smirxrefuse\" and route it to the \"P [facility] MED REFILLS\"  pool to inform the patient and the pharmacy.    Angela Yoo MA     "

## 2025-02-08 RX ORDER — FLUOXETINE HYDROCHLORIDE 40 MG/1
80 CAPSULE ORAL DAILY
Qty: 120 CAPSULE | Refills: 0 | Status: SHIPPED | OUTPATIENT
Start: 2025-02-08

## 2025-02-08 NOTE — TELEPHONE ENCOUNTER
Medication Limited Refill    Reason: Patient needs: provider visit (scheduled 3/20/25)  Provider: I have not called the patient about the limited Rx refill, please call.  PCS: Please notify the pharmacy, Please contact the patient to explain reasoning provided above and to schedule the patient for a provider visit with any provider.      Ada Patel MD

## 2025-02-10 ENCOUNTER — TELEPHONE (OUTPATIENT)
Dept: FAMILY MEDICINE | Facility: CLINIC | Age: 30
End: 2025-02-10
Payer: COMMERCIAL

## 2025-02-10 NOTE — TELEPHONE ENCOUNTER
Reason for call: Schedule appointment     Attempt to reach: 1st    Outcome:Left voicemail    Detailed message left? Yes, asked pt to call back to schedule sooner appt than 03/20.     Please return call to [facility]     Clinic phone number [phone]

## 2025-02-10 NOTE — TELEPHONE ENCOUNTER
Message below forwarded to Care Coordinator from RN. CC reached out to patient and scheduled him in the earliest slot available, 3/3/25 @ 3:00pm (in person, per pt). RN stated that  would write an Rx for patient as a bridge to his next appointment. This was explained to the patient.    Yashira Logan  Lead Care Coordinator  [facility]  [phone]          From: Ada Patel MD   Sent: 2/7/2025   3:03 PM CST   To: Vaishnavi Keen RN; Max Crane MD; *   Subject: Scheduling Issue                                 Hello team,     I saw this patient sent a message expressing frustration with scheduling. Yesterday I was managing a patient with a positive PHQ-9 and was not able to leave the room so the team attempted to move the patient over to [facility]'s schedule but it sounds like there was a miscommunication and the patient disconnected from the call and didn't see the repeat link. I have never met this patient and the last time he was seen in our clinic for chronic concerns was 6/2024. I am on my ICU rotation so my schedule is limited but is there a way we could get a closer follow up for this patient before his prescriptions run out? He could also be seen by other provider on orange.     Please let me know,     Ada Patel MD

## 2025-03-01 DIAGNOSIS — E78.2 MIXED HYPERLIPIDEMIA: ICD-10-CM

## 2025-03-03 ENCOUNTER — OFFICE VISIT (OUTPATIENT)
Dept: FAMILY MEDICINE | Facility: CLINIC | Age: 30
End: 2025-03-03
Payer: COMMERCIAL

## 2025-03-03 VITALS
BODY MASS INDEX: 32.28 KG/M2 | HEIGHT: 68 IN | SYSTOLIC BLOOD PRESSURE: 125 MMHG | RESPIRATION RATE: 16 BRPM | OXYGEN SATURATION: 98 % | DIASTOLIC BLOOD PRESSURE: 80 MMHG | HEART RATE: 50 BPM | WEIGHT: 213 LBS | TEMPERATURE: 98.1 F

## 2025-03-03 DIAGNOSIS — E78.2 MIXED HYPERLIPIDEMIA: ICD-10-CM

## 2025-03-03 DIAGNOSIS — F64.0 GENDER DYSPHORIA IN ADULT: ICD-10-CM

## 2025-03-03 DIAGNOSIS — F32.1 CURRENT MODERATE EPISODE OF MAJOR DEPRESSIVE DISORDER, UNSPECIFIED WHETHER RECURRENT (H): ICD-10-CM

## 2025-03-03 PROCEDURE — 3079F DIAST BP 80-89 MM HG: CPT

## 2025-03-03 PROCEDURE — 3074F SYST BP LT 130 MM HG: CPT

## 2025-03-03 PROCEDURE — 90746 HEPB VACCINE 3 DOSE ADULT IM: CPT

## 2025-03-03 PROCEDURE — 90472 IMMUNIZATION ADMIN EACH ADD: CPT

## 2025-03-03 PROCEDURE — 90471 IMMUNIZATION ADMIN: CPT

## 2025-03-03 PROCEDURE — 90632 HEPA VACCINE ADULT IM: CPT

## 2025-03-03 PROCEDURE — 99214 OFFICE O/P EST MOD 30 MIN: CPT | Mod: 25

## 2025-03-03 RX ORDER — ROSUVASTATIN CALCIUM 20 MG/1
20 TABLET, COATED ORAL DAILY
Qty: 30 TABLET | Refills: 0 | OUTPATIENT
Start: 2025-03-03

## 2025-03-03 RX ORDER — TESTOSTERONE CYPIONATE 200 MG/ML
60 INJECTION, SOLUTION INTRAMUSCULAR WEEKLY
Qty: 4 ML | Refills: 5 | Status: CANCELLED | OUTPATIENT
Start: 2025-03-03

## 2025-03-03 RX ORDER — BUPROPION HYDROCHLORIDE 150 MG/1
150 TABLET ORAL EVERY MORNING
Qty: 90 TABLET | Refills: 2 | Status: SHIPPED | OUTPATIENT
Start: 2025-03-03 | End: 2025-03-03

## 2025-03-03 RX ORDER — ROSUVASTATIN CALCIUM 20 MG/1
20 TABLET, COATED ORAL DAILY
Qty: 90 TABLET | Refills: 2 | Status: SHIPPED | OUTPATIENT
Start: 2025-03-03

## 2025-03-03 RX ORDER — BUPROPION HYDROCHLORIDE 150 MG/1
150 TABLET ORAL EVERY MORNING
Qty: 90 TABLET | Refills: 2 | Status: SHIPPED | OUTPATIENT
Start: 2025-03-03

## 2025-03-03 RX ORDER — TESTOSTERONE CYPIONATE 200 MG/ML
60 INJECTION, SOLUTION INTRAMUSCULAR WEEKLY
Qty: 5 ML | Refills: 5 | Status: SHIPPED | OUTPATIENT
Start: 2025-03-03

## 2025-03-03 RX ORDER — ROSUVASTATIN CALCIUM 20 MG/1
20 TABLET, COATED ORAL DAILY
Qty: 90 TABLET | Refills: 2 | Status: SHIPPED | OUTPATIENT
Start: 2025-03-03 | End: 2025-03-03

## 2025-03-03 ASSESSMENT — ANXIETY QUESTIONNAIRES
7. FEELING AFRAID AS IF SOMETHING AWFUL MIGHT HAPPEN: MORE THAN HALF THE DAYS
IF YOU CHECKED OFF ANY PROBLEMS ON THIS QUESTIONNAIRE, HOW DIFFICULT HAVE THESE PROBLEMS MADE IT FOR YOU TO DO YOUR WORK, TAKE CARE OF THINGS AT HOME, OR GET ALONG WITH OTHER PEOPLE: VERY DIFFICULT
4. TROUBLE RELAXING: MORE THAN HALF THE DAYS
3. WORRYING TOO MUCH ABOUT DIFFERENT THINGS: MORE THAN HALF THE DAYS
2. NOT BEING ABLE TO STOP OR CONTROL WORRYING: MORE THAN HALF THE DAYS
7. FEELING AFRAID AS IF SOMETHING AWFUL MIGHT HAPPEN: MORE THAN HALF THE DAYS
GAD7 TOTAL SCORE: 13
6. BECOMING EASILY ANNOYED OR IRRITABLE: SEVERAL DAYS
8. IF YOU CHECKED OFF ANY PROBLEMS, HOW DIFFICULT HAVE THESE MADE IT FOR YOU TO DO YOUR WORK, TAKE CARE OF THINGS AT HOME, OR GET ALONG WITH OTHER PEOPLE?: VERY DIFFICULT
5. BEING SO RESTLESS THAT IT IS HARD TO SIT STILL: SEVERAL DAYS
GAD7 TOTAL SCORE: 13
GAD7 TOTAL SCORE: 13
1. FEELING NERVOUS, ANXIOUS, OR ON EDGE: NEARLY EVERY DAY

## 2025-03-03 NOTE — PATIENT INSTRUCTIONS
Patient Education   Here is the plan from today's visit    1. Current moderate episode of major depressive disorder, unspecified whether recurrent (H)  refill  - buPROPion (WELLBUTRIN XL) 150 MG 24 hr tablet; Take 1 tablet (150 mg) by mouth every morning.  Dispense: 90 tablet; Refill: 2    2. Gender dysphoria in adult  3. Mixed hyperlipidemia  Please get fasting labs mid way of you shot cycle. Your labs should be done fasting only by consuming water for 8-10 hours before hand.   - rosuvastatin (CRESTOR) 20 MG tablet; Take 1 tablet (20 mg) by mouth daily.  Dispense: 90 tablet; Refill: 2  - Testosterone total; Future  - Lipid panel; Future  - Comprehensive metabolic panel; Future  - Hemoglobin; Future  - testosterone cypionate (DEPOTESTOSTERONE) 200 MG/ML injection; Inject 0.3 mLs (60 mg) into the muscle once a week.  Dispense: 4 mL; Refill: 5  - testosterone cypionate (DEPOTESTOSTERONE) 200 MG/ML injection; Inject 0.3 mLs (60 mg) into the muscle once a week.  Dispense: 5 mL; Refill: 5    Please call or return to clinic if your symptoms don't go away.    Follow up plan  Return in about 6 months (around 9/3/2025), or if symptoms worsen or fail to improve, for Follow up.    Thank you for coming to [facility] today.  Lab Testing:  **If you had lab testing today and your results are reassuring or normal they will be mailed to you or sent through [employer] within 7 days.   **If the lab tests need quick action we will call you with the results.  **If you are having labs done on a different day, please call [phone] to schedule at [facility] or [phone] for other [facility] [place] [facility] locations. Labs do not offer walk-in appointments.  The phone number we will call with results is # [phone] (home) . If this is not the best number please call our clinic and change the number.  Medication Refills:  If you need any refills please call your pharmacy and they will contact us.   If you need to   your refill at a new pharmacy, please contact the new pharmacy directly. The new pharmacy will help you get your medications transferred faster.   Scheduling:  If you have any concerns about today's visit or wish to schedule another appointment please call our office during normal business hours [phone] (8-5:00 M-F). If you can no longer make a scheduled visit, please cancel via [employer] or call us to cancel.   If a referral was made to an [facility] specialty provider and you do not get a call from central scheduling, please refer to directions on your visit summary or call our office during normal business hours for assistance.   If a Mammogram was ordered for you at the Breast Center call [phone] to schedule or change your appointment.  If you had an XRay/CT/Ultrasound/MRI ordered the number is [phone] to schedule or change your radiology appointment.   [facility] has limited ultrasound appointments available on Wednesdays, if you would like your ultrasound at [facility], please call [phone] to schedule.   Medical Concerns:  If you have urgent medical concerns please call [phone] at any time of the day.    Vikas Rodriguez MD

## 2025-03-03 NOTE — TELEPHONE ENCOUNTER
"Request for medication refill:    Medication Name:     rosuvastatin (CRESTOR) 20 MG tablet       Providers if patient needs an appointment and you are willing to give a one month supply please refill for one month and  send a MyChart using \".SMILLIMITEDREFILL\" .Or route chart to \"P [facility] \" . To call patient and inform of limited refill and providers request to make an appointment. (Giving one month refill in non controlled medications is strongly recommended before denial)    If refill has been denied, meaning absolutely no refills without visit, please complete the smart phrase \".SMIRXREFUSE\" and route it to the \"P [facility] MED REFILLS\"  pool to inform the patient and the pharmacy.    Bobo Troy, CMA    "

## 2025-03-03 NOTE — PROGRESS NOTES
"  Assessment & Plan       Gender dysphoria in adult  Mixed hyperlipidemia  Well established on HRT for masculinizing therapy. Initiated on Crestor by cardiology due to family history of hyperlipidemia and very high LDL. Hormone therapy and Crestor are well tolerated. Has normal exam with stable vitals. Due for lab monitoring, and requested fasting mid injection cycle lab draw which is reassuringly benign except just under goal for testosterone (ideally 500-600) (could increase weekly dose by 5-10%), and LDL at goal < 100, seeing mildly elevated but improving triglycerides. Eligible for refill of testosterone per PDMP.    - Testosterone total; Future  - Lipid panel; Future  - Comprehensive metabolic panel; Future  - Hemoglobin; Future  - testosterone cypionate (DEPOTESTOSTERONE) 200 MG/ML injection; Inject 0.3 mLs (60 mg) into the muscle once a week.  - rosuvastatin (CRESTOR) 20 MG tablet; Take 1 tablet (20 mg) by mouth daily.    Current moderate episode of major depressive disorder, unspecified whether recurrent (H)  Steadily decreasing PHQ-9. Normal psych exam, lower concern for safety. Refill provided.   - buPROPion (WELLBUTRIN XL) 150 MG 24 hr tablet; Take 1 tablet (150 mg) by mouth every morning.      BMI  Estimated body mass index is 32.39 kg/m  as calculated from the following:    Height as of this encounter: 1.727 m (5' 8\").    Weight as of this encounter: 96.6 kg (213 lb).       Return in about 6 months (around 9/3/2025), or if symptoms worsen or fail to improve, for Follow up.    Dmitriy Smith is a 29 year old, presenting for the following health issues:  Recheck Medication      3/3/2025     3:28 PM   Additional Questions   Roomed by Raminin         3/3/2025    Information    services provided? No     HPI      Luis is a 29 year old individual that uses pronouns He/they that presents today for follow up of gender affirming hormone therapy for gender affirmation.     GAHT history "   Started GAHT: in 2017  Last visit: 2024  Last labs: 2024    S/p gonadectomy? No    Embodiment goals: Maintaining current status    Interval history  Currently takin mg testosterone cypionate IM weekly  Shot day:   Missed doses?  No    Overall things are going: Well. Biggest issue is pharmacy is giving only 1 ml vial per month  What changes have you noticed?   Fat redistribution: Yes  Change in libido or sexual experience: No  Genital changes: Yes  Genital pain: No  Hot flashes:  No  Mood swings: No    Voice deepening:  Yes  Facial hair growth:  Yes  Cessation of menses: Yes  Increase in muscle mass:  Yes  Acne or oily skin: Yes  Hair loss: Yes    Breast development: Yes, in preferred way  Decreased spontaneous erections: No  Decrease in body hair: No, increased  Chest Pains, shortness of breath, leg swelling?: No  Headache: Yes, history of migraines, unchanged  Right upper abdominal pain: No      Pace of changes: acceptable   Unmet embodiment goals?  No  Any changes you are not liking? No    Sexual Health  - New sexual partners: No  - Contraception:  No, N/A, not sperm making partners  - Desires STI screening? No  - Interested in PrEP, Doxy PEP?  No     Social history  Smoking? No    Mental Health        10/23/2024    10:23 AM 2025     5:38 PM 3/3/2025     3:28 PM   PHQ   PHQ-9 Total Score 11  14  11    Q9: Thoughts of better off dead/self-harm past 2 weeks Not at all  Not at all Not at all        Patient-reported    Proxy-reported         2020     1:14 PM 2025     8:39 AM 3/3/2025     3:27 PM   SERGO-7 SCORE   Total Score  19 (severe anxiety) 13 (moderate anxiety)   Total Score 11 19  13        Patient-reported    Proxy-reported           Review of Systems  Mood is good. No SI, HI or hallucinations  Constitutional, HEENT, cardiovascular, pulmonary, gi and gu systems are negative, except as otherwise noted.      Objective    /80 (BP Location: Left arm, Patient Position:  "Sitting, Cuff Size: Adult Regular)   Pulse 50   Temp 98.1  F (36.7  C) (Oral)   Resp 16   Ht 1.727 m (5' 8\")   Wt 96.6 kg (213 lb)   SpO2 98%   BMI 32.39 kg/m    Body mass index is 32.39 kg/m .  Physical Exam   GENERAL: alert, well nourished, well developed and no distress  EYES: Eyes grossly normal to inspection, and conjunctivae and sclerae normal  HENT: ear canals and TM's normal, nose and mouth without ulcers or lesions  NECK: no adenopathy, no asymmetry, masses, or scars  RESP: lungs clear to auscultation - no rales, rhonchi or wheezes  CV: regular rate and rhythm on my exam, normal S1 S2, no murmur, no peripheral edema  ABDOMEN: soft, nontender, no hepatosplenomegaly, no masses and bowel sounds normal  MS: no gross musculoskeletal defects noted, no edema  SKIN: no suspicious lesions or rashes  NEURO: Normal strength and tone, mentation intact and speech normal  PSYCH: calm, well dressed, good eye contact, mentation appears normal, affect normal/bright, normal thought content, good insight and judgement  LYMPH: no cervical or clavicular adenopathy    No results found for any visits on 03/03/25.        Signed Electronically by: Vikas Rodriguez MD    Answers submitted by the patient for this visit:  Patient Health Questionnaire (Submitted on 3/3/2025)  If you checked off any problems, how difficult have these problems made it for you to do your work, take care of things at home, or get along with other people?: Somewhat difficult  PHQ9 TOTAL SCORE: 11  Patient Health Questionnaire (G7) (Submitted on 3/3/2025)  SERGO 7 TOTAL SCORE: 13    "

## 2025-03-06 ENCOUNTER — LAB (OUTPATIENT)
Dept: LAB | Facility: CLINIC | Age: 30
End: 2025-03-06
Payer: COMMERCIAL

## 2025-03-06 DIAGNOSIS — E78.2 MIXED HYPERLIPIDEMIA: ICD-10-CM

## 2025-03-06 DIAGNOSIS — F64.0 GENDER DYSPHORIA IN ADULT: ICD-10-CM

## 2025-03-06 LAB
ALBUMIN SERPL BCG-MCNC: 4.4 G/DL (ref 3.5–5.2)
ALP SERPL-CCNC: 89 U/L (ref 40–150)
ALT SERPL W P-5'-P-CCNC: 46 U/L (ref 0–70)
ANION GAP SERPL CALCULATED.3IONS-SCNC: 10 MMOL/L (ref 7–15)
AST SERPL W P-5'-P-CCNC: 33 U/L (ref 0–45)
BILIRUB SERPL-MCNC: 0.6 MG/DL
BUN SERPL-MCNC: 16 MG/DL (ref 6–20)
CALCIUM SERPL-MCNC: 9.5 MG/DL (ref 8.8–10.4)
CHLORIDE SERPL-SCNC: 105 MMOL/L (ref 98–107)
CHOLEST SERPL-MCNC: 170 MG/DL
CREAT SERPL-MCNC: 1.15 MG/DL (ref 0.51–1.17)
EGFRCR SERPLBLD CKD-EPI 2021: 89 ML/MIN/1.73M2
FASTING STATUS PATIENT QL REPORTED: YES
FASTING STATUS PATIENT QL REPORTED: YES
GLUCOSE SERPL-MCNC: 95 MG/DL (ref 70–99)
HCO3 SERPL-SCNC: 24 MMOL/L (ref 22–29)
HDLC SERPL-MCNC: 43 MG/DL
HGB BLD-MCNC: 16.3 G/DL (ref 11.7–17.7)
LDLC SERPL CALC-MCNC: 96 MG/DL
NONHDLC SERPL-MCNC: 127 MG/DL
POTASSIUM SERPL-SCNC: 4.4 MMOL/L (ref 3.4–5.3)
PROT SERPL-MCNC: 7.1 G/DL (ref 6.4–8.3)
SODIUM SERPL-SCNC: 139 MMOL/L (ref 135–145)
TRIGL SERPL-MCNC: 154 MG/DL

## 2025-03-09 LAB — TESTOST SERPL-MCNC: 444 NG/DL (ref 8–950)

## 2025-06-29 ASSESSMENT — HEADACHE IMPACT TEST (HIT 6)
HOW OFTEN HAVE YOU FELT TOO TIRED TO WORK BECAUSE OF YOUR HEADACHES: SOMETIMES
WHEN YOU HAVE A HEADACHE HOW OFTEN DO YOU WISH YOU COULD LIE DOWN: ALWAYS
HOW OFTEN DO HEADACHES LIMIT YOUR DAILY ACTIVITIES: VERY OFTEN
HOW OFTEN DID HEADACHS LIMIT CONCENTRATION ON WORK OR DAILY ACTIVITY: SOMETIMES
WHEN YOU HAVE HEADACHES HOW OFTEN IS THE PAIN SEVERE: VERY OFTEN
HOW OFTEN HAVE YOU FELT FED UP OR IRRITATED BECAUSE OF YOUR HEADACHES: SOMETIMES
HIT6 TOTAL SCORE: 65

## 2025-06-30 ENCOUNTER — OFFICE VISIT (OUTPATIENT)
Dept: NEUROLOGY | Facility: CLINIC | Age: 30
End: 2025-06-30
Payer: COMMERCIAL

## 2025-06-30 VITALS
HEART RATE: 74 BPM | SYSTOLIC BLOOD PRESSURE: 114 MMHG | OXYGEN SATURATION: 96 % | DIASTOLIC BLOOD PRESSURE: 77 MMHG | RESPIRATION RATE: 16 BRPM

## 2025-06-30 DIAGNOSIS — G43.709 CHRONIC MIGRAINE WITHOUT AURA WITHOUT STATUS MIGRAINOSUS, NOT INTRACTABLE: Chronic | ICD-10-CM

## 2025-06-30 DIAGNOSIS — G43.909 MIGRAINE WITHOUT STATUS MIGRAINOSUS, NOT INTRACTABLE, UNSPECIFIED MIGRAINE TYPE: Primary | ICD-10-CM

## 2025-06-30 RX ORDER — PROCHLORPERAZINE MALEATE 10 MG
10 TABLET ORAL EVERY 6 HOURS PRN
Qty: 30 TABLET | Refills: 4 | Status: SHIPPED | OUTPATIENT
Start: 2025-06-30

## 2025-06-30 RX ORDER — ELETRIPTAN HYDROBROMIDE 40 MG/1
40 TABLET, FILM COATED ORAL
Qty: 54 TABLET | Refills: 4 | Status: SHIPPED | OUTPATIENT
Start: 2025-06-30

## 2025-06-30 RX ORDER — FREMANEZUMAB-VFRM 225 MG/1.5ML
1.5 INJECTION SUBCUTANEOUS
Qty: 1.5 ML | Refills: 11 | Status: SHIPPED | OUTPATIENT
Start: 2025-06-30

## 2025-06-30 RX ORDER — PROPRANOLOL HYDROCHLORIDE 40 MG/1
40 TABLET ORAL 2 TIMES DAILY
Qty: 180 TABLET | Refills: 3 | Status: SHIPPED | OUTPATIENT
Start: 2025-06-30

## 2025-06-30 ASSESSMENT — MIGRAINE DISABILITY ASSESSMENT (MIDAS)
HOW MANY DAYS WAS YOUR PRODUCTIVITY CUT IN HALF BECAUSE OF HEADACHES: 12
HOW OFTEN WERE SOCIAL ACTIVITIES MISSED DUE TO HEADACHES: 5
HOW MANY DAYS DID YOU MISS WORK OR SCHOOL BECAUSE OF HEADACHES: 3
HOW MANY DAYS IN THE PAST 3 MONTHS HAVE YOU HAD A HEADACHE: 20
TOTAL SCORE: 41
HOW MANY DAYS DID YOU NOT DO HOUSEWORK BECAUSE OF HEADACHES: 6
HOW MANY DAYS WAS HOUSEWORK PRODUCTIVITY CUT IN HALF DUE TO HEADACHES: 15
ON A SCALE FROM 0-10 ON AVERAGE HOW PAINFUL WERE HEADACHES: 5

## 2025-06-30 ASSESSMENT — PAIN SCALES - GENERAL: PAINLEVEL_OUTOF10: NO PAIN (0)

## 2025-06-30 NOTE — NURSING NOTE
Chief Complaint   Patient presents with    RECHECK     /77 (BP Location: Right arm, Patient Position: Sitting, Cuff Size: Adult Large)   Pulse 74   Resp 16   SpO2 96%     IVIS ALEXANDREA

## 2025-06-30 NOTE — LETTER
6/30/2025       RE: Luis Epstein  [address] [facility] [address]     Dear Colleague,    Thank you for referring your patient, Luis Epstein, to the [facility] [place] at [facility]. Please see a copy of my visit note below.    [facility]    Headache Neurology Progress Note  June 30, 2025      Assessment/Plan:   Luis Epstein is a 29 year old adult who returns for follow up of episodic migraine without aura.  This is complicated by history of chronic sinus infection and sinus surgery, now stable.  I suspect he has migraine on a genetic basis. Headaches have increased recently, with more weather triggers.  I recommend continuing propranolol and adding Ajovy subcutaneous injection every 30 days.  He will continue eletriptan, prochlorperazine as needed.       We reviewed his symptomatic treatment strategy, with both acute and preventative treatment.  - For acute treatment of mild headache, he will continue ibuprofen as needed, not to exceed more than 14 days/month to avoid medication overuse.  - For acute treatment of moderate to severe headache, I recommend eletriptan at an increased dose of 40 mg at the onset of headache, with a repeat dose in 2 hours if needed.  This should not exceed more than 9 days/month to avoid medication overuse. (Okay to trial half a tab, 20 mg, to see if this prevents side effects)  - For headache related nausea, or as a rescue medicine for headache, I recommend prochlorperazine 10 mg as needed.      His headache frequency and severity warrant prevention.  -I recommend continuing propranolol 40 mg BID.   -He currently takes fluoxetine and bupropion.  A tricyclic antidepressant was considered but will not be added to his current regimen.  -I recommend a trial of Ajovy every 30 days for 3-6 months. Potential side effects were discussed.  We will attempt to get prior authorization.      I will see him back in 3-6 months, or sooner if needed.    The longitudinal plan of care for Luis was addressed during this visit. Due to the added complexity in care, I will continue to support Luis in the subsequent management of this condition(s) and with the ongoing continuity of care of this condition(s).    Mirtha Acosta MD  Neurology     Subjective:    Luis Epstein returns for follow up of episodic migraine.    Over the past month or so, headaches have been worse, and more triggered by weather changes.    Migraine attacks are unchanged in quality.    He takes propranolol 40 mg BID. No side effects. Remains effective.    He takes Prozac and bupropion.    He takes eletriptan 40 mg as needed, which is helpful. Works best if taken at onset. Has muscle aching after taking this.  Adds prochlorperazine for nausea.    Sleep is helpful.    He is a . Taking a year off.    Objective:    Vitals: /77 (BP Location: Right arm, Patient Position: Sitting, Cuff Size: Adult Large)   Pulse 74   Resp 16   SpO2 96%   General: Cooperative, NAD  Neurologic:  Mental Status: Fully alert, attentive and oriented. Speech clear and fluent.   Cranial Nerves: Facial movements symmetric.   Motor: No abnormal movements.      Pertinent Investigations:            9/18/2022     1:53 PM 2/13/2024     8:31 AM 6/29/2025     8:33 PM   HIT-6   When you have headaches, how often is the pain severe 10 11 11   How often do headaches limit your ability to do usual daily activities including household work, work, school, or social activities? 11 11 11   When you have a headache, how often do you wish you could lie down? 13 13 13   In the past 4 weeks, how often have you felt too tired to do work or daily activities because of your headaches 10 10 10   In the past 4 weeks, how often have you felt fed up or irritated because of your headaches 10 10 10   In the past 4 weeks, how often did headaches limit your ability to concentrate  on work or daily activities 11 10 10   HIT-6 Total Score 65 65 65        Patient-reported           9/22/2022    12:16 PM 2/13/2024     8:33 AM 6/30/2025     9:19 AM   MIDAS - in the past three months:   On how many days did you miss work or school because of your headaches? 2 3 3   How many days was your productivity at work or school reduced by half or more because of your headaches? 10 5 12   On how many days did you not do household work because of your headaches? 5 5 6   How many days was your productivity in household work reduced by half or more because of your headaches? 10 5 15   On how many days did you miss family, social, or leisure activities because of your headaches? 5 2 5   On how many days did you have a headache? 15 10 20   On a scale of 0-10, on average how painful were these headaches? 5 6 5   MIDAS Score 32 (IV - Severe Disability) 20 (III - Moderate Disability) 41 (IV - Severe Disability)          Again, thank you for allowing me to participate in the care of your patient.      Sincerely,    iMrtha Acosta MD

## 2025-06-30 NOTE — PROGRESS NOTES
[facility]    Headache Neurology Progress Note  June 30, 2025      Assessment/Plan:   Luis Epstein is a 29 year old adult who returns for follow up of episodic migraine without aura.  This is complicated by history of chronic sinus infection and sinus surgery, now stable.  I suspect he has migraine on a genetic basis. Headaches have increased recently, with more weather triggers.  I recommend continuing propranolol and adding Ajovy subcutaneous injection every 30 days.  He will continue eletriptan, prochlorperazine as needed.       We reviewed his symptomatic treatment strategy, with both acute and preventative treatment.  - For acute treatment of mild headache, he will continue ibuprofen as needed, not to exceed more than 14 days/month to avoid medication overuse.  - For acute treatment of moderate to severe headache, I recommend eletriptan at an increased dose of 40 mg at the onset of headache, with a repeat dose in 2 hours if needed.  This should not exceed more than 9 days/month to avoid medication overuse. (Okay to trial half a tab, 20 mg, to see if this prevents side effects)  - For headache related nausea, or as a rescue medicine for headache, I recommend prochlorperazine 10 mg as needed.      His headache frequency and severity warrant prevention.  -I recommend continuing propranolol 40 mg BID.   -He currently takes fluoxetine and bupropion.  A tricyclic antidepressant was considered but will not be added to his current regimen.  -I recommend a trial of Ajovy every 30 days for 3-6 months. Potential side effects were discussed.  We will attempt to get prior authorization.     I will see him back in 3-6 months, or sooner if needed.    The longitudinal plan of care for Luis was addressed during this visit. Due to the added complexity in care, I will continue to support Luis in the subsequent management of this condition(s) and with the ongoing continuity of care of this  condition(s).    Mirtha Acosta MD  Neurology     Subjective:    Luis Epstein returns for follow up of episodic migraine.    Over the past month or so, headaches have been worse, and more triggered by weather changes.    Migraine attacks are unchanged in quality.    He takes propranolol 40 mg BID. No side effects. Remains effective.    He takes Prozac and bupropion.    He takes eletriptan 40 mg as needed, which is helpful. Works best if taken at onset. Has muscle aching after taking this.  Adds prochlorperazine for nausea.    Sleep is helpful.    He is a . Taking a year off.    Objective:    Vitals: /77 (BP Location: Right arm, Patient Position: Sitting, Cuff Size: Adult Large)   Pulse 74   Resp 16   SpO2 96%   General: Cooperative, NAD  Neurologic:  Mental Status: Fully alert, attentive and oriented. Speech clear and fluent.   Cranial Nerves: Facial movements symmetric.   Motor: No abnormal movements.      Pertinent Investigations:            9/18/2022     1:53 PM 2/13/2024     8:31 AM 6/29/2025     8:33 PM   HIT-6   When you have headaches, how often is the pain severe 10 11 11   How often do headaches limit your ability to do usual daily activities including household work, work, school, or social activities? 11 11 11   When you have a headache, how often do you wish you could lie down? 13 13 13   In the past 4 weeks, how often have you felt too tired to do work or daily activities because of your headaches 10 10 10   In the past 4 weeks, how often have you felt fed up or irritated because of your headaches 10 10 10   In the past 4 weeks, how often did headaches limit your ability to concentrate on work or daily activities 11 10 10   HIT-6 Total Score 65 65 65        Patient-reported           9/22/2022    12:16 PM 2/13/2024     8:33 AM 6/30/2025     9:19 AM   MIDAS - in the past three months:   On how many days did you miss work or school because of your headaches? 2 3 3   How many days was  your productivity at work or school reduced by half or more because of your headaches? 10 5 12   On how many days did you not do household work because of your headaches? 5 5 6   How many days was your productivity in household work reduced by half or more because of your headaches? 10 5 15   On how many days did you miss family, social, or leisure activities because of your headaches? 5 2 5   On how many days did you have a headache? 15 10 20   On a scale of 0-10, on average how painful were these headaches? 5 6 5   MIDAS Score 32 (IV - Severe Disability) 20 (III - Moderate Disability) 41 (IV - Severe Disability)

## 2025-07-01 ASSESSMENT — PATIENT HEALTH QUESTIONNAIRE - PHQ9
SUM OF ALL RESPONSES TO PHQ QUESTIONS 1-9: 9
SUM OF ALL RESPONSES TO PHQ QUESTIONS 1-9: 9
10. IF YOU CHECKED OFF ANY PROBLEMS, HOW DIFFICULT HAVE THESE PROBLEMS MADE IT FOR YOU TO DO YOUR WORK, TAKE CARE OF THINGS AT HOME, OR GET ALONG WITH OTHER PEOPLE: SOMEWHAT DIFFICULT

## 2025-07-02 ENCOUNTER — OFFICE VISIT (OUTPATIENT)
Dept: FAMILY MEDICINE | Facility: CLINIC | Age: 30
End: 2025-07-02
Payer: COMMERCIAL

## 2025-07-02 VITALS
HEART RATE: 56 BPM | TEMPERATURE: 98 F | HEIGHT: 68 IN | DIASTOLIC BLOOD PRESSURE: 78 MMHG | RESPIRATION RATE: 16 BRPM | SYSTOLIC BLOOD PRESSURE: 127 MMHG | BODY MASS INDEX: 31.22 KG/M2 | OXYGEN SATURATION: 98 % | WEIGHT: 206 LBS

## 2025-07-02 DIAGNOSIS — B36.0 TINEA VERSICOLOR: Primary | ICD-10-CM

## 2025-07-02 PROBLEM — Z86.69 HISTORY OF OTITIS: Status: ACTIVE | Noted: 2025-07-02

## 2025-07-02 RX ORDER — KETOCONAZOLE 20 MG/ML
SHAMPOO, SUSPENSION TOPICAL DAILY
Qty: 120 ML | Refills: 3 | Status: SHIPPED | OUTPATIENT
Start: 2025-07-02

## 2025-07-02 RX ORDER — KETOCONAZOLE 20 MG/ML
SHAMPOO, SUSPENSION TOPICAL DAILY
Qty: 120 ML | Refills: 0 | Status: SHIPPED | OUTPATIENT
Start: 2025-07-02 | End: 2025-07-02

## 2025-07-02 NOTE — PROGRESS NOTES
Assessment & Plan     1. Tinea versicolor (Primary)  History of fungal skin infections including bilateral otitis externa earlier this year requiring treatment. 3-4 months of non-pruritic macular rash on skin of thorax, most severe on upper back. On exam evoked scale sign when gently scraping lesions. Presentation is most consistent with tinea versicolor. Will treat with ketoconazole 2% shampoo as body was daily for 1-4 weeks followed by preventative ketoconazole 2% shampoo as body wash 1-4 times monthly. If symptoms do not improve with topical therapy will consider oral treatment.   - ketoconazole (NIZORAL) 2 % external shampoo; Apply topically daily. Use shampoo as body wash, leave on for 5-10 minutes in the shower prior to thorough rinsing for 1-4 weeks. For prevention, use ketoconazole 2% shampoo as body wash (apply for 5-10 minutes to body in shower and then wash off) 1-4 times monthly.  Dispense: 120 mL; Refill: 3       Follow-up   Return in about 4 weeks (around 7/30/2025) for Routine preventive.        Dmitriy Smith is a 29 year old, presenting for the following health issues:  Derm Problem (Rash on chest and back since Feb. )      7/2/2025    10:26 AM   Additional Questions   Roomed by Chey   Accompanied by self         7/2/2025    Information    services provided? No     HPI      Rash  Onset/Duration: 02-03/2025  Description  Location: chest and back  Character: round, raised, blanchable   Itching: no  Intensity:  mild  Progression of Symptoms:  waxing and waning  Accompanying signs and symptoms:   Fever: No  Body aches or joint pain: No  Sore throat symptoms: No  Recent cold symptoms: No  History:  Previous episodes of similar rash: unsure - had a fungal skin infection in high school but can't remember what it looked like  New exposures:  None  Recent travel: No  Exposure to similar rash: No  Had a fungal ear infection bilaterally earlier in the year - treated with drops  "  Bikes a lot and wears sweaty clothing - uses moisture wicking bike attire   Therapies tried and outcome: none       Mental health      2/5/2025     5:38 PM 3/3/2025     3:28 PM 7/1/2025     4:45 PM   PHQ   PHQ-9 Total Score 14  11  9    Q9: Thoughts of better off dead/self-harm past 2 weeks Not at all Not at all  Not at all       Patient-reported    Proxy-reported             Objective    /78   Pulse 56   Temp 98  F (36.7  C) (Temporal)   Resp 16   Ht 1.727 m (5' 8\")   Wt 93.4 kg (206 lb)   SpO2 98%   BMI 31.32 kg/m    Body mass index is 31.32 kg/m .  Physical Exam   Vitals reviewed.   Constitutional: awake, alert, cooperative, in NAD  HEENT: NCAT, sclera without injection, EOM intact, ear canals and TM's normal, nose and mouth without ulcers or lesions, moist mucous mebranes  Respiratory: Normal pulmonary effort without coughing or wheezing  Skin: Scars from bilateral mastectomy. Multiple round light brown macules with minimal overlying fine scale when lesion is stretched present on left chest, diffusely on upper and lower back.           Musculoskeletal: No obvious joint or extremity deformity, edema, or erythema, ROM grossly intact  Neurologic: A&Ox4, without focal deficit, cranial nerves II-XII grossly intact  Psychiatric: Alert & calm with appropriate affect, mood, insight, and thought processes        No results found for any visits on 07/02/25.        Signed Electronically by: Ada Patel MD    Answers submitted by the patient for this visit:  Patient Health Questionnaire (Submitted on 7/1/2025)  If you checked off any problems, how difficult have these problems made it for you to do your work, take care of things at home, or get along with other people?: Somewhat difficult  PHQ9 TOTAL SCORE: 9    "

## 2025-07-02 NOTE — PATIENT INSTRUCTIONS
Patient Education   Here is the plan from today's visit    1. Tinea versicolor (Primary)  If symptoms do not improve reach out to the clinic to start oral therapy. Use ketoconazole 2% shampoo as body wash once daily leave on for 5-10 minutes in the shower prior to thorough rinsing for 1-4 weeks. For prevention use ketoconazole 2% shampoo as body wash (apply for 5-10 minutes to body in shower and then wash off) 1-4 times monthly.  - ketoconazole (NIZORAL) 2 % external shampoo; Apply topically daily. Use shampoo as body wash, leave on for 5-10 minutes in the shower prior to thorough rinsing for 1-4 weeks  Dispense: 120 mL; Refill: 0          Please call or return to clinic if your symptoms don't go away.    Follow up plan  Return in about 4 weeks (around 7/30/2025) for Routine preventive.    Thank you for coming to [facility] today.  Lab Testing:  **If you had lab testing today and your results are reassuring or normal they will be mailed to you or sent through [employer] within 7 days.   **If the lab tests need quick action we will call you with the results.  **If you are having labs done on a different day, please call [phone] to schedule at [facility] or [phone] for other [facility]patient [facility] locations. Labs do not offer walk-in appointments.  The phone number we will call with results is # [phone] (home) . If this is not the best number please call our clinic and change the number.  Medication Refills:  If you need any refills please call your pharmacy and they will contact us.   If you need to  your refill at a new pharmacy, please contact the new pharmacy directly. The new pharmacy will help you get your medications transferred faster.   Scheduling:  If you have any concerns about today's visit or wish to schedule another appointment please call our office during normal business hours [phone] (8-5:00 M-F). If you can no longer make a scheduled visit, please cancel via  [facility] or call us to cancel.   If a referral was made to an [facility] specialty provider and you do not get a call from central scheduling, please refer to directions on your visit summary or call our office during normal business hours for assistance.   If a Mammogram was ordered for you at the [facility] call [phone] to schedule or change your appointment.  If you had an XRay/CT/Ultrasound/MRI ordered the number is [phone] to schedule or change your radiology appointment.   [facility] has limited ultrasound appointments available on Wednesdays, if you would like your ultrasound at [facility], please call [phone] to schedule.   Medical Concerns:  If you have urgent medical concerns please call [phone] at any time of the day.    Ada Patel MD

## 2025-07-21 SDOH — HEALTH STABILITY: PHYSICAL HEALTH: ON AVERAGE, HOW MANY MINUTES DO YOU ENGAGE IN EXERCISE AT THIS LEVEL?: 60 MIN

## 2025-07-21 SDOH — HEALTH STABILITY: PHYSICAL HEALTH: ON AVERAGE, HOW MANY DAYS PER WEEK DO YOU ENGAGE IN MODERATE TO STRENUOUS EXERCISE (LIKE A BRISK WALK)?: 6 DAYS

## 2025-07-21 ASSESSMENT — SOCIAL DETERMINANTS OF HEALTH (SDOH): HOW OFTEN DO YOU GET TOGETHER WITH FRIENDS OR RELATIVES?: ONCE A WEEK

## 2025-07-24 ENCOUNTER — OFFICE VISIT (OUTPATIENT)
Dept: FAMILY MEDICINE | Facility: CLINIC | Age: 30
End: 2025-07-24
Payer: COMMERCIAL

## 2025-07-24 VITALS
DIASTOLIC BLOOD PRESSURE: 76 MMHG | HEIGHT: 68 IN | BODY MASS INDEX: 31.1 KG/M2 | RESPIRATION RATE: 18 BRPM | HEART RATE: 57 BPM | SYSTOLIC BLOOD PRESSURE: 116 MMHG | TEMPERATURE: 97.9 F | OXYGEN SATURATION: 97 % | WEIGHT: 205.2 LBS

## 2025-07-24 DIAGNOSIS — Z00.00 ROUTINE GENERAL MEDICAL EXAMINATION AT A HEALTH CARE FACILITY: Primary | ICD-10-CM

## 2025-07-24 DIAGNOSIS — E78.2 MIXED HYPERLIPIDEMIA: ICD-10-CM

## 2025-07-24 DIAGNOSIS — F64.0 GENDER DYSPHORIA IN ADULT: ICD-10-CM

## 2025-07-24 PROBLEM — H90.11 CONDUCTIVE HEARING LOSS OF RIGHT EAR WITH UNRESTRICTED HEARING OF LEFT EAR: Status: ACTIVE | Noted: 2025-07-24

## 2025-07-24 PROCEDURE — 99395 PREV VISIT EST AGE 18-39: CPT | Mod: GC

## 2025-07-24 PROCEDURE — 3074F SYST BP LT 130 MM HG: CPT

## 2025-07-24 PROCEDURE — 3078F DIAST BP <80 MM HG: CPT

## 2025-07-24 ASSESSMENT — PATIENT HEALTH QUESTIONNAIRE - PHQ9: SUM OF ALL RESPONSES TO PHQ QUESTIONS 1-9: 11

## 2025-07-24 NOTE — PROGRESS NOTES
Preventive Care Visit  [facility] EMILIANO Patel MD, Family Medicine  Jul 24, 2025      Assessment & Plan       Patient has been advised of split billing requirements and indicates understanding: Yes    1. Routine general medical examination at a health care facility (Primary)  Luis Epstein is a 29 year old who uses he/him/his and they/them/theirs pronouns returning to clinic for annual preventative care. Reviewed past medical, surgical, social, and family histories and updated in the chart as needed. Up-to-date on vaccines. Reviewed cervical cancer screening guidelines/recommendations. Very low risk given partner is also a trans man, negative HPV self-swab in 2024, and no history of abnormal pap smears.     Counseling  Appropriate preventive services were addressed with this patient via screening, questionnaire, or discussion as appropriate for fall prevention, nutrition, physical activity, Tobacco-use cessation, social engagement, weight loss and cognition.  Checklist reviewing preventive services available has been given to the patient.  Reviewed patient's diet, addressing concerns and/or questions.   He is at risk for psychosocial distress and has been provided with information to reduce risk.     2. Gender dysphoria in adult  3. Mixed hyperlipidemia  On GAHT (Gender Affirming Hormone treatment) since age 21 (2017?), currently on testosterone cypionate 60mg weekly, shot day Monday.   Medication changes today? no  Labs today? No   Follow up schedule: every 6-12 months  Next visit due: 1-2 months  Next labs due: prior to next visit. Patient is interested in estradiol levels given possibly pursing fertility.   Luis engaged in the decision making process and verbalized understanding of the options discussed and agreed with the final plan. All coexisting medical and mental health conditions that could interfere with treatment have been addressed and are reasonably controlled at this time.  They have capacity to weigh risks and benefits and understand consequences of treatment and non-treatment. They have previously been counseled on long term impacts of fertility, and offered resources on fertility preservation during prior visit(s).    - Testosterone total; Future  - Hemoglobin; Future  - Glucose; Future  - Lipid Profile; Future  - Estradiol; Future       Follow-up   Return in about 6 weeks (around 9/4/2025) for [facility] follow up.        Dmitriy Smith is a 29 year old, presenting for the following:  Physical        7/24/2025    10:01 AM   Additional Questions   Roomed by [facility]   Accompanied by Self         7/24/2025    Information    services provided? No          HPI       Recently started injection therapy for migraines with neurologist   -has developed some possible restless leg symptoms since starting that therapy          7/21/2025   General Health   How would you rate your overall physical health? Good   Feel stress (tense, anxious, or unable to sleep) Rather much   (!) STRESS CONCERN  Works as a  and recently quit to take a year off   National political environment   Worried about passport renewal   is going through multi-stage bottom surgery  Family is all on the [facility]   Now meeting with therapist twice weekly        7/21/2025   Nutrition   Three or more servings of calcium each day? Yes   Diet: Regular (no restrictions)   How many servings of fruit and vegetables per day? (!) 2-3   How many sweetened beverages each day? 0-1         7/21/2025   Exercise   Days per week of moderate/strenous exercise 6 days   Average minutes spent exercising at this level 60 min         7/21/2025   Social Factors   Frequency of gathering with friends or relatives Once a week   Worry food won't last until get money to buy more No   Food not last or not have enough money for food? No   Do you have housing? (Housing is defined as stable permanent housing and does not  include staying outside in a car, in a tent, in an abandoned building, in an overnight shelter, or couch-surfing.) Yes   Are you worried about losing your housing? No   Lack of transportation? No   Unable to get utilities (heat,electricity)? No         7/21/2025   Dental   Dentist two times every year? Yes       Today's PHQ-9 Score:       7/24/2025    10:02 AM   PHQ-9 SCORE   PHQ-9 Total Score 11         3/3/2025     3:28 PM 7/1/2025     4:45 PM 7/24/2025    10:02 AM   PHQ   PHQ-9 Total Score 11  9  11   Q9: Thoughts of better off dead/self-harm past 2 weeks Not at all  Not at all Not at all       Patient-reported    Proxy-reported            7/21/2025   Substance Use   Alcohol more than 3/day or more than 7/wk No   Do you use any other substances recreationally? (!) CANNABIS PRODUCTS     Social History     Tobacco Use    Smoking status: Never    Smokeless tobacco: Never   Vaping Use    Vaping status: Never Used   Substance Use Topics    Alcohol use: Yes     Alcohol/week: 1.0 standard drink of alcohol     Types: 1 Standard drinks or equivalent per week     Comment: social    Drug use: Yes     Frequency: 3.0 times per week     Types: Marijuana          Mammogram Screening - Patient under 40 years of age: Routine Mammogram Screening not recommended.         7/21/2025   STI Screening   New sexual partner(s) since last STI/HIV test? No     History of abnormal Pap smear: No - age 21-29 PAP every 3 years recommended.         Latest Ref Rng & Units 9/9/2024     4:16 PM 11/24/2021    10:58 AM   PAP / HPV   PAP   Negative for Intraepithelial Lesion or Malignancy (NILM)    HPV 16 DNA Negative Negative     HPV 18 DNA Negative Negative     Other HR HPV Negative Negative             7/21/2025   Contraception/Family Planning   Questions about contraception or family planning (!) YES    What are your periods like? Not currently having periods   Would be interested in carrying a pregnancy  Wondering about process of IVF and egg  retreival       Reviewed and updated as needed this visit by Provider   Tobacco    Problems  Med Hx  Surg Hx  Fam Hx  Soc Hx Sexual Activity            Past Medical History:   Diagnosis Date    Chronic migraine without aura without status migrainosus, not intractable 01/21/2021    Chronic tonsillitis 07/16/2021    Current moderate episode of major depressive disorder, unspecified whether recurrent (H)     Deviated nasal septum 07/15/2021    status post septoplasty, tonsillectomy on 12/15/2021    Gender dysphoria in adult     Motion sickness     Tonsillar hypertrophy 07/15/2021     Past Surgical History:   Procedure Laterality Date    MASTECTOMY, BILATERAL  2016    SEPTOPLASTY, TURBINOPLASTY, COMBINED Bilateral 12/16/2021    Procedure: SEPTOPLASTY, NOSE, WITH TURBINOPLASTY;  Surgeon: Sae Knight MD;  Location: [facility] OR    TONSILLECTOMY Bilateral 12/16/2021    Procedure: TONSILLECTOMY;  Surgeon: Sae Knight MD;  Location: [facility] OR    WISDOM TOOTH EXTRACTION Bilateral      Labs reviewed in EPIC  Patient Active Problem List   Diagnosis    Gender dysphoria in adult    Acne, unspecified acne type    Family history of hyperlipidemia    Current moderate episode of major depressive disorder, unspecified whether recurrent (H)    Chronic migraine without aura without status migrainosus, not intractable    Sleep-disordered breathing    Chronic pansinusitis    Chronic pain of right knee    Chronic right shoulder pain    ADHD (attention deficit hyperactivity disorder)    Ulnar neuropathy at elbow, right    Anxiety    Mixed hyperlipidemia    Hypoglycemia    History of otitis    Tinea versicolor    Conductive hearing loss of right ear with unrestricted hearing of left ear     Past Surgical History:   Procedure Laterality Date    MASTECTOMY, BILATERAL  2016    SEPTOPLASTY, TURBINOPLASTY, COMBINED Bilateral 12/16/2021    Procedure: SEPTOPLASTY, NOSE, WITH TURBINOPLASTY;  Surgeon: Sae Knight MD;   "Location: [facility] OR    TONSILLECTOMY Bilateral 12/16/2021    Procedure: TONSILLECTOMY;  Surgeon: Sae Knight MD;  Location: [facility] OR    WISDOM TOOTH EXTRACTION Bilateral        Social History     Tobacco Use    Smoking status: Never    Smokeless tobacco: Never   Substance Use Topics    Alcohol use: Yes     Alcohol/week: 1.0 standard drink of alcohol     Types: 1 Standard drinks or equivalent per week     Comment: social     Family History   Problem Relation Age of Onset    Hyperlipidemia Mother     Hypertension Mother     Hyperlipidemia Father     Hypertension Father         Took statin at age 27 years    Dementia Maternal Grandfather     Alzheimer Disease Paternal Grandmother     Cerebrovascular Disease Paternal Grandfather          Current Outpatient Medications   Medication Sig Dispense Refill    amphetamine-dextroamphetamine (ADDERALL XR) 15 MG 24 hr capsule Take 15 mg by mouth daily      buPROPion (WELLBUTRIN XL) 150 MG 24 hr tablet Take 1 tablet (150 mg) by mouth every morning. 90 tablet 2    eletriptan (RELPAX) 40 MG tablet Take 1 tablet (40 mg) by mouth at onset of headache for migraine. May repeat in 2 hours. Max 2 tablets/24 hours. 54 tablet 4    FLUoxetine (PROZAC) 40 MG capsule Take 2 capsules (80 mg) by mouth daily. 120 capsule 0    Fremanezumab-vfrm (AJOVY) 225 MG/1.5ML SOAJ Inject 225 mg subcutaneously every 30 days. 1.5 mL 11    ketoconazole (NIZORAL) 2 % external shampoo Apply topically daily. Use shampoo as body wash, leave on for 5-10 minutes in the shower prior to thorough rinsing for 1-4 weeks. For prevention, use ketoconazole 2% shampoo as body wash (apply for 5-10 minutes to body in shower and then wash off) 1-4 times monthly. 120 mL 3    needle, disp, 18G X 1\" MISC 1 applicator every 7 days 100 each 0    Needle, Disp, 23G X 1\" MISC 1 applicator every 7 days 100 each 0    prochlorperazine (COMPAZINE) 10 MG tablet Take 1 tablet (10 mg) by mouth every 6 hours as needed for nausea or vomiting " "(migraine). 30 tablet 4    propranolol (INDERAL) 40 MG tablet Take 1 tablet (40 mg) by mouth 2 times daily. 180 tablet 3    rosuvastatin (CRESTOR) 20 MG tablet Take 1 tablet (20 mg) by mouth daily. 90 tablet 2    Syringe, Disposable, (SYRINGE LUER LOCK) 3 ML MISC 1 applicator every 7 days 100 each 0    testosterone cypionate (DEPOTESTOSTERONE) 200 MG/ML injection Inject 0.3 mLs (60 mg) into the muscle once a week. 5 mL 5          Objective    Exam  /76   Pulse 57   Temp 97.9  F (36.6  C) (Temporal)   Resp 18   Ht 1.727 m (5' 8\")   Wt 93.1 kg (205 lb 3.2 oz)   SpO2 97%   BMI 31.20 kg/m     Estimated body mass index is 31.2 kg/m  as calculated from the following:    Height as of this encounter: 1.727 m (5' 8\").    Weight as of this encounter: 93.1 kg (205 lb 3.2 oz).    Physical Exam  Vitals reviewed.   Constitutional: awake, alert, cooperative, in NAD  Respiratory: Lungs CTAB without increased work of breathing  Cardiovascular: RRR without murmurs or extra sounds  Skin: No visible lesions, erythema, rashes, or ecchymosis on limited skin exam  Musculoskeletal: No lower extremity edema    Neurologic: A&Ox4 without focal deficit, cranial nerves II-XII grossly intact  Psychiatric: Alert & calm with appropriate affect, mood, and thought processes        Signed Electronically by: Ada Patle MD    "

## 2025-07-24 NOTE — PATIENT INSTRUCTIONS
Patient Education   Preventive Care Advice   This is general advice given by our system to help you stay healthy. However, your care team may have specific advice just for you. Please talk to your care team about your preventive care needs.  Nutrition  Eat 5 or more servings of fruits and vegetables each day.  Try wheat bread, brown rice and whole grain pasta (instead of white bread, rice, and pasta).  Get enough calcium and vitamin D. Check the label on foods and aim for 100% of the RDA (recommended daily allowance).  Lifestyle  Exercise at least 150 minutes each week  (30 minutes a day, 5 days a week).  Do muscle strengthening activities 2 days a week. These help control your weight and prevent disease.  No smoking.  Wear sunscreen to prevent skin cancer.  Have a dental exam and cleaning every 6 months.  Yearly exams  See your health care team every year to talk about:  Any changes in your health.  Any medicines your care team has prescribed.  Preventive care, family planning, and ways to prevent chronic diseases.  Shots (vaccines)   HPV shots (up to age 26), if you've never had them before.  Hepatitis B shots (up to age 59), if you've never had them before.  COVID-19 shot: Get this shot when it's due.  Flu shot: Get a flu shot every year.  Tetanus shot: Get a tetanus shot every 10 years.  Pneumococcal, hepatitis A, and RSV shots: Ask your care team if you need these based on your risk.  Shingles shot (for age 50 and up)  General health tests  Diabetes screening:  Starting at age 35, Get screened for diabetes at least every 3 years.  If you are younger than age 35, ask your care team if you should be screened for diabetes.  Cholesterol test: At age 39, start having a cholesterol test every 5 years, or more often if advised.  Bone density scan (DEXA): At age 50, ask your care team if you should have this scan for osteoporosis (brittle bones).  Hepatitis C: Get tested at least once in your life.  STIs (sexually  transmitted infections)  Before age 24: Ask your care team if you should be screened for STIs.  After age 24: Get screened for STIs if you're at risk. You are at risk for STIs (including HIV) if:  You are sexually active with more than one person.  You don't use condoms every time.  You or a partner was diagnosed with a sexually transmitted infection.  If you are at risk for HIV, ask about PrEP medicine to prevent HIV.  Get tested for HIV at least once in your life, whether you are at risk for HIV or not.  Cancer screening tests  Cervical cancer screening: If you have a cervix, begin getting regular cervical cancer screening tests starting at age 21.  Breast cancer scan (mammogram): If you've ever had breasts, begin having regular mammograms starting at age 40. This is a scan to check for breast cancer.  Colon cancer screening: It is important to start screening for colon cancer at age 45.  Have a colonoscopy test every 10 years (or more often if you're at risk) Or, ask your provider about stool tests like a FIT test every year or Cologuard test every 3 years.  To learn more about your testing options, visit:   .  For help making a decision, visit:   https://bit.ly/hm29480.  Prostate cancer screening test: If you have a prostate, ask your care team if a prostate cancer screening test (PSA) at age 55 is right for you.  Lung cancer screening: If you are a current or former smoker ages 50 to 80, ask your care team if ongoing lung cancer screenings are right for you.  For informational purposes only. Not to replace the advice of your health care provider. Copyright   2023 [facility]. All rights reserved. Clinically reviewed by the [facility]. [facility] 227698 - REV 01/24.  Learning About Stress  What is stress?     Stress is your body's response to a hard situation. Your body can have a physical, emotional, or mental response. Stress is a fact of life for most people, and it  affects everyone differently. What causes stress for you may not be stressful for someone else.  A lot of things can cause stress. You may feel stress when you go on a job interview, take a test, or run a race. This kind of short-term stress is normal and even useful. It can help you if you need to work hard or react quickly. For example, stress can help you finish an important job on time.  Long-term stress is caused by ongoing stressful situations or events. Examples of long-term stress include long-term health problems, ongoing problems at work, or conflicts in your family. Long-term stress can harm your health.  How does stress affect your health?  When you are stressed, your body responds as though you are in danger. It makes hormones that speed up your heart, make you breathe faster, and give you a burst of energy. This is called the fight-or-flight stress response. If the stress is over quickly, your body goes back to normal and no harm is done.  But if stress happens too often or lasts too long, it can have bad effects. Long-term stress can make you more likely to get sick, and it can make symptoms of some diseases worse. If you tense up when you are stressed, you may develop neck, shoulder, or low back pain. Stress is linked to high blood pressure and heart disease.  Stress also harms your emotional health. It can make you das, tense, or depressed. Your relationships may suffer, and you may not do well at work or school.  What can you do to manage stress?  You can try these things to help manage stress:   Do something active. Exercise or activity can help reduce stress. Walking is a great way to get started. Even everyday activities such as housecleaning or yard work can help.  Try yoga or efren chi. These techniques combine exercise and meditation. You may need some training at first to learn them.  Do something you enjoy. For example, listen to music or go to a movie. Practice your hobby or do volunteer  "work.  Meditate. This can help you relax, because you are not worrying about what happened before or what may happen in the future.  Do guided imagery. Imagine yourself in any setting that helps you feel calm. You can use online videos, books, or a teacher to guide you.  Do breathing exercises. For example:  From a standing position, bend forward from the waist with your knees slightly bent. Let your arms dangle close to the floor.  Breathe in slowly and deeply as you return to a standing position. Roll up slowly and lift your head last.  Hold your breath for just a few seconds in the standing position.  Breathe out slowly and bend forward from the waist.  Let your feelings out. Talk, laugh, cry, and express anger when you need to. Talking with supportive friends or family, a counselor, or a ezra leader about your feelings is a healthy way to relieve stress. Avoid discussing your feelings with people who make you feel worse.  Write. It may help to write about things that are bothering you. This helps you find out how much stress you feel and what is causing it. When you know this, you can find better ways to cope.  What can you do to prevent stress?  You might try some of these things to help prevent stress:  Manage your time. This helps you find time to do the things you want and need to do.  Get enough sleep. Your body recovers from the stresses of the day while you are sleeping.  Get support. Your family, friends, and community can make a difference in how you experience stress.  Limit your news feed. Avoid or limit time on social media or news that may make you feel stressed.  Do something active. Exercise or activity can help reduce stress. Walking is a great way to get started.  Where can you learn more?  Go to https://www.[employer].net/patiented  Enter N032 in the search box to learn more about \"Learning About Stress.\"  Current as of: October 24, 2024  Content Version: 14.5 2024-2025 [employer].   Care instructions adapted under license by your healthcare professional. If you have questions about a medical condition or this instruction, always ask your healthcare professional. [employer] disclaims any warranty or liability for your use of this information.    Recovering From Depression: Care Instructions  Overview    Sticking to your treatment plan is important as you recover from depression. It may take time for your symptoms to get better after you start treatment. Try not to give up if you don't feel better right away. Make sure you keep going to counseling and taking any prescribed medicine if they are part of your treatment plan.  Focus on things that can help you feel better, such as being with friends and family. Try to eat healthy foods, be active, and get enough sleep. Take things slowly as you begin to recover.  Follow-up care is a key part of your treatment and safety. Be sure to make and go to all appointments, and call your doctor if you are having problems. It's also a good idea to know your test results and keep a list of the medicines you take.  How can you care for yourself at home?  Be realistic  If you have a large task to do, break it up into smaller steps you can handle, and just do what you can.  You may want to put off important decisions until your depression has lifted. If you have plans that will have a major impact on your life, such as marriage, divorce, or a job change, try to wait a bit. Talk it over with friends and loved ones who can help you look at the overall picture first.  Reaching out to people for help is important. Do not isolate yourself. Let your family and friends help you. Find someone you can trust and confide in, and talk to that person.  Be patient, and be kind to yourself. Remember that depression is not your fault and is not something you can overcome with willpower alone. Treatment is important for depression, just like for any other  illness. Feeling better takes time, and your mood will improve little by little.  Stay active  Stay busy and get outside. Take a walk, or try some other light exercise.  Talk with your doctor about an exercise program. Exercise can help with mild depression.  Go to a movie or concert. Take part in a Judaism activity or other social gathering. Go to a ball game.  Ask a friend to have dinner with you.  Take care of yourself  Eat healthy foods such as fresh fruits and vegetables, whole grains, and lean protein. If you have lost your appetite, eat small snacks rather than large meals.  Avoid using marijuana and other drugs and drinking alcohol. Do not take medicines that have not been prescribed for you. They may interfere with medicines you may be taking for depression, or they may make your depression worse.  Take your medicines exactly as they are prescribed. You may start to feel better within 1 to 3 weeks of taking antidepressant medicine. But it can take as many as 6 to 8 weeks to see more improvement. If you have questions or concerns about your medicines, or if you do not notice any improvement by 3 weeks, talk to your doctor.  Continue to take your medicine after your symptoms improve. Taking your medicine for at least 6 months after you feel better can help keep you from getting depressed again. If this isn't the first time you have been depressed, your doctor may recommend you to take medicine even longer.  If you have any side effects from your medicine, tell your doctor. Many side effects are mild and will go away on their own after you have been taking the medicine for a few weeks. Some may last longer. Talk to your doctor if side effects are bothering you too much. You might be able to try a different medicine.  Continue counseling. It may help prevent depression from returning, especially if you've had multiple episodes of depression. Talk with your counselor if you are having a hard time attending your  sessions or you think the sessions aren't working. Don't just stop going.  Get enough sleep. Talk to your doctor if you are having problems sleeping.  Avoid sleeping pills unless they are prescribed by the doctor treating your depression. Sleeping pills may make you groggy during the day, and they may interact with other medicine you are taking.  If you have any other illnesses, such as diabetes, heart disease, or high blood pressure, make sure to continue with your treatment. Tell your doctor about all of the medicines you take, including those with or without a prescription.  Where to get help 24 hours a day, 7 days a week  If you or someone you know talks about suicide, self-harm, a mental health crisis, a substance use crisis, or any other kind of emotional distress, get help right away. You can:  Call the Suicide and Crisis Lifeline at [employer].  Text HOME to 090789 to access the Crisis Text Line.  Consider saving these numbers in your phone.  Go to [employer] for more information or to chat online.  Call 811 anytime you think you may need emergency care. For example, call if:  You feel like hurting yourself or someone else.  Someone you know has depression and is about to attempt or is attempting suicide.  Where to get help 24 hours a day, 7 days a week  If you or someone you know talks about suicide, self-harm, a mental health crisis, a substance use crisis, or any other kind of emotional distress, get help right away. You can:  Call the Suicide and Crisis Lifeline at 570.  Text HOME to 580466 to access the Crisis Text Line.  Consider saving these numbers in your phone.  Go to [employer] for more information or to chat online.  Call your doctor now or seek immediate medical care if:  You hear voices.  Someone you know has depression and:  Starts to give away possessions.  Uses illegal drugs or drinks alcohol heavily.  Talks or writes about death, including writing suicide notes or talking about guns,  "knives, or pills.  Starts to spend a lot of time alone.  Acts very aggressively or suddenly appears calm.  Watch closely for changes in your health, and be sure to contact your doctor if:  You do not get better as expected.  Where can you learn more?  Go to https://www.[employer].net/patiented  Enter N529 in the search box to learn more about \"Recovering From Depression: Care Instructions.\"  Current as of: July 31, 2024  Content Version: 14.5 2024-2025 [employer].   Care instructions adapted under license by your healthcare professional. If you have questions about a medical condition or this instruction, always ask your healthcare professional. [employer] disclaims any warranty or liability for your use of this information.       "

## 2025-08-13 ENCOUNTER — TELEPHONE (OUTPATIENT)
Dept: CARDIOLOGY | Facility: CLINIC | Age: 30
End: 2025-08-13

## 2025-08-13 ENCOUNTER — VIRTUAL VISIT (OUTPATIENT)
Dept: CARDIOLOGY | Facility: CLINIC | Age: 30
End: 2025-08-13
Payer: COMMERCIAL

## 2025-08-13 DIAGNOSIS — E78.2 MIXED HYPERLIPIDEMIA: ICD-10-CM

## 2025-08-13 RX ORDER — ROSUVASTATIN CALCIUM 20 MG/1
20 TABLET, COATED ORAL DAILY
Qty: 100 TABLET | Refills: 6 | Status: SHIPPED | OUTPATIENT
Start: 2025-08-13

## (undated) DEVICE — NDL 19GA 1.5"

## (undated) DEVICE — PACK SET-UP STD 9102

## (undated) DEVICE — DRAPE SPLIT EENT 76X124" 3X28" 9447

## (undated) DEVICE — GOWN XLG DISP 9545

## (undated) DEVICE — DECANTER TRANSFER DEVICE 2008S

## (undated) DEVICE — SOL NACL 0.9% INJ 1000ML BAG 07983-09

## (undated) DEVICE — PACK MINOR SBA15MIFSE

## (undated) DEVICE — SYR 10ML FINGER CONTROL W/O NDL 309695

## (undated) DEVICE — SU PROLENE 2-0 FS 18" 8685H

## (undated) DEVICE — MARKER SKIN DOUBLE TIP W/FLEXI-RULER W/LABELS

## (undated) DEVICE — SOL WATER IRRIG 1000ML BOTTLE 07139-09

## (undated) DEVICE — GLOVE PROTEXIS W/NEU-THERA 7.5  2D73TE75

## (undated) DEVICE — ANTIFOG SOLUTION W/FOAM PAD CF-1001

## (undated) DEVICE — BASIN SET MINOR DISP

## (undated) DEVICE — SPONGE COTTONOID 1/2X3" 80-1407

## (undated) DEVICE — SPECIMEN CONTAINER 4OZ

## (undated) DEVICE — ESU ELEC BLADE 2.75" COATED/INSULATED E1455

## (undated) DEVICE — SU CHROMIC 4-0 P-3 18" 1654G

## (undated) DEVICE — BLADE TURBINATE INFERIOR 2MM ROTATE  1882040HR

## (undated) DEVICE — SUCTION TIP YANKAUER W/O VENT K86

## (undated) DEVICE — DRAPE SHEET HALF 40X60" 9358

## (undated) DEVICE — ENDO SHEATH STORZ SHARPSITE ENDOSCRUB 0DEG 4MM 1912000

## (undated) DEVICE — NDL DENTAL 27GA LONG 8881400058

## (undated) DEVICE — DRSG NASOPORE FRAG FIRM 8CM 5400-020-008

## (undated) DEVICE — NDL 25GA 1.5" 305127

## (undated) DEVICE — SUCTION CANISTER MEDIVAC LINER 1500ML W/LID 65651-515

## (undated) DEVICE — MASK CONE SHAPED 00152

## (undated) DEVICE — SU CHROMIC 3-0 SH 27" G122H

## (undated) DEVICE — ESU SUCTION CAUTERY 10FR FOOT CONTROL E2505-10FR

## (undated) DEVICE — TUBING SUCTION 10'X3/16" N510

## (undated) DEVICE — NDL SPINAL 25GA 3.5" QUINCKE 405180

## (undated) DEVICE — ESU GROUND PAD ADULT W/CORD E7507

## (undated) DEVICE — ESU PENCIL SMOKE EVAC W/ROCKER SWITCH 0703-047-000

## (undated) RX ORDER — GINSENG 100 MG
CAPSULE ORAL
Status: DISPENSED
Start: 2021-12-16

## (undated) RX ORDER — COCAINE HYDROCHLORIDE 40 MG/ML
SOLUTION NASAL
Status: DISPENSED
Start: 2021-12-16

## (undated) RX ORDER — LIDOCAINE HYDROCHLORIDE 20 MG/ML
INJECTION, SOLUTION INFILTRATION; PERINEURAL
Status: DISPENSED
Start: 2021-12-16

## (undated) RX ORDER — LIDOCAINE HYDROCHLORIDE AND EPINEPHRINE BITARTRATE 20; .01 MG/ML; MG/ML
INJECTION, SOLUTION SUBCUTANEOUS
Status: DISPENSED
Start: 2021-12-16

## (undated) RX ORDER — ACETAMINOPHEN 325 MG/1
TABLET ORAL
Status: DISPENSED
Start: 2021-12-16

## (undated) RX ORDER — CEFAZOLIN SODIUM 1 G/3ML
INJECTION, POWDER, FOR SOLUTION INTRAMUSCULAR; INTRAVENOUS
Status: DISPENSED
Start: 2021-12-16

## (undated) RX ORDER — SCOLOPAMINE TRANSDERMAL SYSTEM 1 MG/1
PATCH, EXTENDED RELEASE TRANSDERMAL
Status: DISPENSED
Start: 2021-12-16

## (undated) RX ORDER — OXYCODONE HYDROCHLORIDE 5 MG/1
TABLET ORAL
Status: DISPENSED
Start: 2021-12-16

## (undated) RX ORDER — DEXAMETHASONE SODIUM PHOSPHATE 4 MG/ML
INJECTION, SOLUTION INTRA-ARTICULAR; INTRALESIONAL; INTRAMUSCULAR; INTRAVENOUS; SOFT TISSUE
Status: DISPENSED
Start: 2021-12-16

## (undated) RX ORDER — PROPOFOL 10 MG/ML
INJECTION, EMULSION INTRAVENOUS
Status: DISPENSED
Start: 2021-12-16